# Patient Record
Sex: FEMALE | Race: WHITE | NOT HISPANIC OR LATINO | Employment: FULL TIME | URBAN - METROPOLITAN AREA
[De-identification: names, ages, dates, MRNs, and addresses within clinical notes are randomized per-mention and may not be internally consistent; named-entity substitution may affect disease eponyms.]

---

## 2018-02-09 PROBLEM — H92.01 RIGHT EAR PAIN: Status: ACTIVE | Noted: 2018-02-09

## 2018-05-21 PROBLEM — R73.09 HEMOGLOBIN A1C GREATER THAN 9%, INDICATING POOR DIABETIC CONTROL: Status: ACTIVE | Noted: 2018-05-21

## 2018-05-21 PROBLEM — H92.01 RIGHT EAR PAIN: Status: RESOLVED | Noted: 2018-02-09 | Resolved: 2018-05-21

## 2018-05-21 PROBLEM — E11.69 DYSLIPIDEMIA WITH LOW HIGH DENSITY LIPOPROTEIN (HDL) CHOLESTEROL WITH HYPERTRIGLYCERIDEMIA DUE TO TYPE 2 DIABETES MELLITUS: Status: ACTIVE | Noted: 2018-05-21

## 2018-05-21 PROBLEM — E78.2 DYSLIPIDEMIA WITH LOW HIGH DENSITY LIPOPROTEIN (HDL) CHOLESTEROL WITH HYPERTRIGLYCERIDEMIA DUE TO TYPE 2 DIABETES MELLITUS: Status: ACTIVE | Noted: 2018-05-21

## 2019-05-23 ENCOUNTER — HOSPITAL ENCOUNTER (OUTPATIENT)
Dept: RADIOLOGY | Facility: HOSPITAL | Age: 54
Discharge: HOME | End: 2019-05-23
Attending: NURSE PRACTITIONER
Payer: COMMERCIAL

## 2019-05-23 DIAGNOSIS — R05.9 COUGH: ICD-10-CM

## 2019-05-23 DIAGNOSIS — R09.82 PND (POST-NASAL DRIP): ICD-10-CM

## 2019-08-07 ENCOUNTER — OFFICE VISIT (OUTPATIENT)
Dept: GASTROENTEROLOGY | Facility: CLINIC | Age: 54
End: 2019-08-07
Payer: COMMERCIAL

## 2019-08-07 VITALS
WEIGHT: 236 LBS | DIASTOLIC BLOOD PRESSURE: 87 MMHG | SYSTOLIC BLOOD PRESSURE: 101 MMHG | RESPIRATION RATE: 18 BRPM | HEART RATE: 73 BPM | OXYGEN SATURATION: 95 % | BODY MASS INDEX: 39.32 KG/M2 | HEIGHT: 65 IN

## 2019-08-07 DIAGNOSIS — Z80.0 FAMILY HISTORY OF COLON CANCER: ICD-10-CM

## 2019-08-07 DIAGNOSIS — Z12.11 ENCOUNTER FOR SCREENING COLONOSCOPY: ICD-10-CM

## 2019-08-07 DIAGNOSIS — Z12.11 COLON CANCER SCREENING: ICD-10-CM

## 2019-08-07 DIAGNOSIS — K21.00 GASTROESOPHAGEAL REFLUX DISEASE WITH ESOPHAGITIS: Primary | ICD-10-CM

## 2019-08-07 DIAGNOSIS — E66.9 OBESITY (BMI 35.0-39.9 WITHOUT COMORBIDITY): ICD-10-CM

## 2019-08-07 DIAGNOSIS — K21.9 GERD (GASTROESOPHAGEAL REFLUX DISEASE): ICD-10-CM

## 2019-08-07 DIAGNOSIS — K21.9 GASTROESOPHAGEAL REFLUX DISEASE, ESOPHAGITIS PRESENCE NOT SPECIFIED: Primary | ICD-10-CM

## 2019-08-07 DIAGNOSIS — R10.11 ABDOMINAL DISCOMFORT IN RIGHT UPPER QUADRANT: ICD-10-CM

## 2019-08-07 PROCEDURE — 99203 PR OFFICE/OUTPT VISIT, NEW, LEVL III, 30-44 MIN: ICD-10-PCS | Mod: S$GLB,,, | Performed by: INTERNAL MEDICINE

## 2019-08-07 PROCEDURE — 99203 OFFICE O/P NEW LOW 30 MIN: CPT | Mod: S$GLB,,, | Performed by: INTERNAL MEDICINE

## 2019-08-07 PROCEDURE — 99999 PR PBB SHADOW E&M-EST. PATIENT-LVL III: ICD-10-PCS | Mod: PBBFAC,,, | Performed by: INTERNAL MEDICINE

## 2019-08-07 PROCEDURE — 99999 PR PBB SHADOW E&M-EST. PATIENT-LVL III: CPT | Mod: PBBFAC,,, | Performed by: INTERNAL MEDICINE

## 2019-08-07 RX ORDER — ESCITALOPRAM OXALATE 10 MG/1
20 TABLET ORAL DAILY
Refills: 11 | COMMUNITY
Start: 2019-08-04

## 2019-08-07 RX ORDER — SODIUM, POTASSIUM,MAG SULFATES 17.5-3.13G
1 SOLUTION, RECONSTITUTED, ORAL ORAL DAILY
Qty: 1 KIT | Refills: 0 | Status: SHIPPED | OUTPATIENT
Start: 2019-08-07 | End: 2019-08-09

## 2019-08-07 NOTE — PROGRESS NOTES
"Subjective:       Patient ID: Zunilda Alvarez is a 54 y.o. female.    Chief Complaint: Gas; Diarrhea; and Other ("stinky belches")    Her mother had colon cancer in the 60s.  Her last colonoscopy was 15 years ago.  She has had bowel irregularity.  She has had semi solid bowel movements.  Additionally she has had pyrosis and dyspepsia but she denies dysphagia.  She has eructations.  She denies aspiration or tension.  She thinks the omeprazole has not helped her.  She denies hematemesis hematochezia dysphagia aspiration jaundice melena.      Allergies:  Review of patient's allergies indicates:  No Known Allergies    Medications:    Current Outpatient Medications:     aspirin (ECOTRIN) 81 MG EC tablet, Take 81 mg by mouth once daily., Disp: , Rfl:     blood-glucose meter kit, TID testing of blood sugar Type II DM with long term use of insulin, Disp: 1 each, Rfl: 0    CONTOUR NEXT TEST STRIPS Strp, USE 1 STRIP TO CHECK GLUCOSE THREE TIMES DAILY, Disp: 100 strip, Rfl: 11    empagliflozin (JARDIANCE) 10 mg Tab, Take 10 mg by mouth once daily., Disp: 90 tablet, Rfl: 1    escitalopram oxalate (LEXAPRO) 10 MG tablet, TAKE 1 TABLET BY MOUTH ONCE DAILY FOR 30 DAYS, Disp: , Rfl: 11    fenofibric acid, choline, (TRILIPIX ORAL), Trilipix, Disp: , Rfl:     fluticasone propionate (FLONASE) 50 mcg/actuation nasal spray, 2 sprays (100 mcg total) by Each Nare route once daily., Disp: 1 Bottle, Rfl: 2    LANTUS SOLOSTAR U-100 INSULIN glargine 100 units/mL (3mL) SubQ pen, INJECT 70 UNITS SUBCUTANEOUSLY IN THE EVENING, Disp: 15 Syringe, Rfl: 11    lisinopril-hydrochlorothiazide (PRINZIDE,ZESTORETIC) 10-12.5 mg per tablet, Take 1 tablet by mouth once daily., Disp: , Rfl:     loratadine-pseudoephedrine 5-120 mg (CLARITIN-D 12 HOUR) 5-120 mg per tablet, Claritin-D 12 Hour 5 mg-120 mg tablet,extended release  Take 1 tablet every 12 hours by oral route., Disp: , Rfl:     metFORMIN (GLUCOPHAGE) 1000 MG tablet, TAKE ONE TABLET " BY MOUTH TWICE DAILY WITH MEALS, Disp: 60 tablet, Rfl: 11    metoprolol succinate (TOPROL-XL) 50 MG 24 hr tablet, Take 50 mg by mouth 2 (two) times daily. , Disp: , Rfl:     NOVOLOG FLEXPEN U-100 INSULIN 100 unit/mL InPn pen, INJECT 18 UNITS SUBCUTANEOUSLY THREE TIMES DAILY WITH MEALS, Disp: 15 Syringe, Rfl: 5    omeprazole (PRILOSEC) 40 MG capsule, Take 40 mg by mouth once daily., Disp: , Rfl:     pravastatin (PRAVACHOL) 20 MG tablet, Take 20 mg by mouth once daily., Disp: , Rfl:     semaglutide (OZEMPIC) 0.25 mg or 0.5 mg(2 mg/1.5 mL) PnIj, Inject 1 mg into the skin every 7 days., Disp: 3 mL, Rfl: 5    Past Medical History:   Diagnosis Date    Diabetes mellitus type 2, insulin dependent     Hypertension        Past Surgical History:   Procedure Laterality Date    HYSTERECTOMY  1998         Review of Systems   Constitutional: Negative for appetite change, fever and unexpected weight change.   HENT: Negative for trouble swallowing.         No jaundice.   Respiratory: Negative for cough, shortness of breath and wheezing.         She she is physically active and denies respiratory symptoms.  She has never used tobacco.  She denies alcohol.   Cardiovascular: Negative for chest pain.        She states her hypertension and hyperlipidemia well controlled.  She denies cardiopulmonary symptoms.   Gastrointestinal: Positive for abdominal distention and diarrhea. Negative for anal bleeding, blood in stool, constipation and nausea.        She does have nausea without vomiting.  She does not relate her GI symptoms to a specific food.  She describes semi solid bowel movements with increased gassiness in .   Endocrine:        She states her diabetes is well controlled.   Musculoskeletal: Positive for arthralgias. Negative for back pain and neck pain.   Skin: Negative for pallor and rash.   Neurological: Negative for dizziness, seizures, syncope, speech difficulty, weakness and numbness.   Hematological: Negative for  adenopathy.   Psychiatric/Behavioral: Negative for confusion.       Objective:      Physical Exam   Constitutional: She is oriented to person, place, and time. She appears well-developed and well-nourished.   Well-nourished well-hydrated obese nonicteric white female   HENT:   Head: Normocephalic.   Eyes: Pupils are equal, round, and reactive to light. EOM are normal.   Neck: Normal range of motion. Neck supple. No tracheal deviation present. No thyromegaly present.   Cardiovascular: Normal rate, regular rhythm and normal heart sounds.   Pulmonary/Chest: Effort normal and breath sounds normal.   Abdominal: Soft. Bowel sounds are normal.   Abdomen is soft with mild tenderness in the right upper quadrant epigastrium.  Organomegaly masses are not detected.  Bowel sounds are normal.  Rectal time of colonoscopy   Musculoskeletal: Normal range of motion.   She can ambulate normally.  She can get from the sitting to the standing position without difficulty.  She get on the examination table without assistance.   Lymphadenopathy:     She has no cervical adenopathy.   Neurological: She is alert and oriented to person, place, and time. No cranial nerve deficit.   Skin: Skin is warm and dry.   Psychiatric: She has a normal mood and affect. Her behavior is normal.   Vitals reviewed.        Plan:       Gastroesophageal reflux disease with esophagitis    Abdominal discomfort in right upper quadrant    Encounter for screening colonoscopy    Family history of colon cancer    Obesity (BMI 35.0-39.9 without comorbidity)    Other orders  -     US Abdomen Complete; Future; Expected date: 08/07/2019

## 2019-08-07 NOTE — LETTER
August 7, 2019      Lisa Y. Cooksey, DIONICIO  [address]  [place]  [place] [address]           [facility]  [address], [address]  [place] [address]  Phone: [phone]  Fax: [phone]          Patient: Zunilda Alvarez   MR Number: [id number]   YOB: 1965   Date of Visit: 8/7/2019       Dear Lisa Y. Cooksey:    Thank you for referring Zunilda Alvarez to me for evaluation. Attached you will find relevant portions of my assessment and plan of care.    If you have questions, please do not hesitate to call me. I look forward to following Zunilda Alvarez along with you.    Sincerely,    Carter Rice MD    Enclosure  CC:  No Recipients    If you would like to receive this communication electronically, please contact [email or web address] or [phone] to request more information on [employer] Link access.    For providers and/or their staff who would like to refer a patient to [facility], please contact us through our one-stop-shop provider referral line, [facility], at [phone].    If you feel you have received this communication in error or would no longer like to receive these types of communications, please e-mail [email or web address]

## 2019-08-07 NOTE — PROGRESS NOTES
She will continue her reflux regimen current medications.  She continues her diabetic diet.  She will try to pinpoint any off ending foods.  I have encouraged weight reduction.  She has good health knowledge.  She understands the procedures of upper endoscopy and colonoscopy.  Specifically she realizes extremely small incidence of bleeding perforation or aspiration.

## 2019-08-07 NOTE — PATIENT INSTRUCTIONS
She will be scheduled for an ultrasound upper endoscopy and colonoscopy.  She will continue her current medications and ADA diet.  I have encouraged weight reduction.

## 2019-08-15 ENCOUNTER — HOSPITAL ENCOUNTER (OUTPATIENT)
Dept: RADIOLOGY | Facility: HOSPITAL | Age: 54
Discharge: HOME | End: 2019-08-15
Attending: INTERNAL MEDICINE
Payer: COMMERCIAL

## 2019-08-15 ENCOUNTER — TELEPHONE (OUTPATIENT)
Dept: GASTROENTEROLOGY | Facility: CLINIC | Age: 54
End: 2019-08-15

## 2019-08-15 DIAGNOSIS — N28.89 NODULE OF KIDNEY: Primary | ICD-10-CM

## 2019-08-15 PROCEDURE — 76700 US EXAM ABDOM COMPLETE: CPT | Mod: 26,,, | Performed by: RADIOLOGY

## 2019-08-15 PROCEDURE — 76700 US ABDOMEN COMPLETE: ICD-10-PCS | Mod: 26,,, | Performed by: RADIOLOGY

## 2019-08-15 PROCEDURE — 76700 US EXAM ABDOM COMPLETE: CPT | Mod: TC

## 2019-08-15 NOTE — TELEPHONE ENCOUNTER
----- Message from Carter Rice MD sent at 8/15/2019  9:48 AM CDT -----  Tell her the ultrasound revealed possible gallstones and a nodule in the kidney.  Order CT scan upper abdomen and pelvis with and without contrast as ordered by the radiologist.

## 2019-08-19 ENCOUNTER — TELEPHONE (OUTPATIENT)
Dept: GASTROENTEROLOGY | Facility: CLINIC | Age: 54
End: 2019-08-19

## 2019-08-19 DIAGNOSIS — N28.89 NODULE OF KIDNEY: Primary | ICD-10-CM

## 2019-08-20 ENCOUNTER — HOSPITAL ENCOUNTER (OUTPATIENT)
Dept: RADIOLOGY | Facility: HOSPITAL | Age: 54
Discharge: HOME | End: 2019-08-20
Attending: INTERNAL MEDICINE
Payer: COMMERCIAL

## 2019-08-20 DIAGNOSIS — N28.89 NODULE OF KIDNEY: ICD-10-CM

## 2019-08-20 PROCEDURE — 74178 CT ABDOMEN PELVIS W WO CONTRAST: ICD-10-PCS | Mod: 26,,, | Performed by: RADIOLOGY

## 2019-08-20 PROCEDURE — 74178 CT ABD&PLV WO CNTR FLWD CNTR: CPT | Mod: 26,,, | Performed by: RADIOLOGY

## 2019-08-20 PROCEDURE — 74178 CT ABD&PLV WO CNTR FLWD CNTR: CPT | Mod: TC

## 2019-08-20 PROCEDURE — 25500020 PHARM REV CODE 255: Performed by: INTERNAL MEDICINE

## 2019-08-20 RX ADMIN — IOHEXOL 100 ML: 350 INJECTION, SOLUTION INTRAVENOUS at 11:08

## 2019-08-20 RX ADMIN — IOHEXOL 15 ML: 300 INJECTION, SOLUTION INTRAVENOUS at 10:08

## 2019-08-21 ENCOUNTER — TELEPHONE (OUTPATIENT)
Dept: GASTROENTEROLOGY | Facility: CLINIC | Age: 54
End: 2019-08-21

## 2019-08-21 DIAGNOSIS — N83.209 CYST OF OVARY, UNSPECIFIED LATERALITY: ICD-10-CM

## 2019-08-21 DIAGNOSIS — N28.81 ENLARGED KIDNEY: Primary | ICD-10-CM

## 2019-08-21 NOTE — TELEPHONE ENCOUNTER
----- Message from Carter Rice MD sent at 8/21/2019  7:22 AM CDT -----  Tell her she has an ovarian cyst and refer her to her gynecologist.  Additionally tell her she has an adrenal enlargement and refer her to the urologist

## 2019-08-21 NOTE — TELEPHONE ENCOUNTER
Pt given CT results. Pt verbalized understanding. She stated she has a GYN, but hasn't been in quite some time. Pt stated she was seeing Dr. Bhaskar Guevara. Will send GYN referral to Dr. Guevara's office. Pt scheduled with Berta Carney NP for urology consult.

## 2019-09-03 ENCOUNTER — TELEPHONE (OUTPATIENT)
Dept: GASTROENTEROLOGY | Facility: CLINIC | Age: 54
End: 2019-09-03

## 2019-09-03 DIAGNOSIS — N28.81 ENLARGED KIDNEY: Primary | ICD-10-CM

## 2019-09-03 DIAGNOSIS — N28.89 NODULE OF KIDNEY: ICD-10-CM

## 2019-09-09 ENCOUNTER — TELEPHONE (OUTPATIENT)
Dept: GASTROENTEROLOGY | Facility: CLINIC | Age: 54
End: 2019-09-09

## 2019-09-09 NOTE — TELEPHONE ENCOUNTER
Informed pt that surgery scheduling advised her to show up for procedure tomorrow at noon. Pt verbalized understanding.    Pt also stated she had not been called about her referral placed on 9/3. Appt scheduled at this time.

## 2019-09-09 NOTE — TELEPHONE ENCOUNTER
----- Message from Rashaad Andrea sent at 9/9/2019 12:27 PM CDT -----  Contact: Patient  Type: Needs Medical Advice    Who Called:  Patient  Best Call Back Number: [phone]  Additional Information: Patient is scheduled for EGD on 9/10/19 and has not received call to notify her of arrival time. Please advise patient when she should arrive at facility for procedure

## 2019-09-10 ENCOUNTER — ANESTHESIA (OUTPATIENT)
Dept: SURGERY | Facility: HOSPITAL | Age: 54
End: 2019-09-10
Payer: COMMERCIAL

## 2019-09-10 ENCOUNTER — HOSPITAL ENCOUNTER (OUTPATIENT)
Facility: HOSPITAL | Age: 54
Discharge: HOME | End: 2019-09-10
Attending: INTERNAL MEDICINE | Admitting: INTERNAL MEDICINE
Payer: COMMERCIAL

## 2019-09-10 ENCOUNTER — ANESTHESIA EVENT (OUTPATIENT)
Dept: SURGERY | Facility: HOSPITAL | Age: 54
End: 2019-09-10
Payer: COMMERCIAL

## 2019-09-10 VITALS
WEIGHT: 227 LBS | DIASTOLIC BLOOD PRESSURE: 80 MMHG | OXYGEN SATURATION: 97 % | HEART RATE: 81 BPM | SYSTOLIC BLOOD PRESSURE: 119 MMHG | BODY MASS INDEX: 38.76 KG/M2 | HEIGHT: 64 IN | TEMPERATURE: 98 F | RESPIRATION RATE: 20 BRPM

## 2019-09-10 DIAGNOSIS — K21.9 GERD (GASTROESOPHAGEAL REFLUX DISEASE): Primary | ICD-10-CM

## 2019-09-10 DIAGNOSIS — K21.9 GASTROESOPHAGEAL REFLUX DISEASE, ESOPHAGITIS PRESENCE NOT SPECIFIED: ICD-10-CM

## 2019-09-10 DIAGNOSIS — Z12.11 COLON CANCER SCREENING: ICD-10-CM

## 2019-09-10 LAB — POCT GLUCOSE: 152 MG/DL (ref 70–110)

## 2019-09-10 PROCEDURE — D9220A PRA ANESTHESIA: ICD-10-PCS | Mod: 33,,, | Performed by: ANESTHESIOLOGY

## 2019-09-10 PROCEDURE — 25000003 PHARM REV CODE 250: Performed by: NURSE ANESTHETIST, CERTIFIED REGISTERED

## 2019-09-10 PROCEDURE — G0121 COLON CA SCRN NOT HI RSK IND: HCPCS | Mod: ,,, | Performed by: INTERNAL MEDICINE

## 2019-09-10 PROCEDURE — 45378 DIAGNOSTIC COLONOSCOPY: CPT | Performed by: INTERNAL MEDICINE

## 2019-09-10 PROCEDURE — D9220A PRA ANESTHESIA: Mod: 33,,, | Performed by: ANESTHESIOLOGY

## 2019-09-10 PROCEDURE — 63600175 PHARM REV CODE 636 W HCPCS: Performed by: NURSE ANESTHETIST, CERTIFIED REGISTERED

## 2019-09-10 PROCEDURE — 88305 TISSUE EXAM BY PATHOLOGIST: CPT | Performed by: PATHOLOGY

## 2019-09-10 PROCEDURE — 43239 EGD BIOPSY SINGLE/MULTIPLE: CPT | Performed by: INTERNAL MEDICINE

## 2019-09-10 PROCEDURE — 63600175 PHARM REV CODE 636 W HCPCS: Performed by: ANESTHESIOLOGY

## 2019-09-10 PROCEDURE — 37000008 HC ANESTHESIA 1ST 15 MINUTES: Performed by: INTERNAL MEDICINE

## 2019-09-10 PROCEDURE — 25000003 PHARM REV CODE 250: Performed by: ANESTHESIOLOGY

## 2019-09-10 PROCEDURE — S0028 INJECTION, FAMOTIDINE, 20 MG: HCPCS | Performed by: ANESTHESIOLOGY

## 2019-09-10 PROCEDURE — 43239 PR EGD, FLEX, W/BIOPSY, SGL/MULTI: ICD-10-PCS | Mod: 51,,, | Performed by: INTERNAL MEDICINE

## 2019-09-10 PROCEDURE — G0121 COLON CA SCRN NOT HI RSK IND: ICD-10-PCS | Mod: ,,, | Performed by: INTERNAL MEDICINE

## 2019-09-10 PROCEDURE — 88305 TISSUE EXAM BY PATHOLOGIST: CPT | Mod: 26,,, | Performed by: PATHOLOGY

## 2019-09-10 PROCEDURE — 27201012 HC FORCEPS, HOT/COLD, DISP: Performed by: INTERNAL MEDICINE

## 2019-09-10 PROCEDURE — 43239 EGD BIOPSY SINGLE/MULTIPLE: CPT | Mod: 51,,, | Performed by: INTERNAL MEDICINE

## 2019-09-10 PROCEDURE — 88305 TISSUE SPECIMEN TO PATHOLOGY - SURGERY: ICD-10-PCS | Mod: 26,,, | Performed by: PATHOLOGY

## 2019-09-10 PROCEDURE — 37000009 HC ANESTHESIA EA ADD 15 MINS: Performed by: INTERNAL MEDICINE

## 2019-09-10 RX ORDER — SODIUM CHLORIDE, SODIUM LACTATE, POTASSIUM CHLORIDE, CALCIUM CHLORIDE 600; 310; 30; 20 MG/100ML; MG/100ML; MG/100ML; MG/100ML
INJECTION, SOLUTION INTRAVENOUS CONTINUOUS
Status: DISCONTINUED | OUTPATIENT
Start: 2019-09-10 | End: 2019-09-10 | Stop reason: HOSPADM

## 2019-09-10 RX ORDER — ONDANSETRON 2 MG/ML
4 INJECTION INTRAMUSCULAR; INTRAVENOUS DAILY PRN
Status: CANCELLED | OUTPATIENT
Start: 2019-09-10

## 2019-09-10 RX ORDER — FAMOTIDINE 10 MG/ML
20 INJECTION INTRAVENOUS ONCE
Status: COMPLETED | OUTPATIENT
Start: 2019-09-10 | End: 2019-09-10

## 2019-09-10 RX ORDER — FAMOTIDINE 10 MG/ML
INJECTION INTRAVENOUS
Status: DISCONTINUED
Start: 2019-09-10 | End: 2019-09-10 | Stop reason: HOSPADM

## 2019-09-10 RX ORDER — LIDOCAINE HYDROCHLORIDE 10 MG/ML
1 INJECTION, SOLUTION EPIDURAL; INFILTRATION; INTRACAUDAL; PERINEURAL ONCE
Status: DISCONTINUED | OUTPATIENT
Start: 2019-09-10 | End: 2019-09-10 | Stop reason: HOSPADM

## 2019-09-10 RX ORDER — PROPOFOL 10 MG/ML
VIAL (ML) INTRAVENOUS
Status: DISCONTINUED | OUTPATIENT
Start: 2019-09-10 | End: 2019-09-10

## 2019-09-10 RX ORDER — SODIUM CHLORIDE, SODIUM LACTATE, POTASSIUM CHLORIDE, CALCIUM CHLORIDE 600; 310; 30; 20 MG/100ML; MG/100ML; MG/100ML; MG/100ML
125 INJECTION, SOLUTION INTRAVENOUS CONTINUOUS
Status: CANCELLED | OUTPATIENT
Start: 2019-09-10

## 2019-09-10 RX ORDER — EPHEDRINE SULFATE 50 MG/ML
INJECTION, SOLUTION INTRAVENOUS
Status: DISCONTINUED | OUTPATIENT
Start: 2019-09-10 | End: 2019-09-10

## 2019-09-10 RX ORDER — DIPHENHYDRAMINE HYDROCHLORIDE 50 MG/ML
12.5 INJECTION INTRAMUSCULAR; INTRAVENOUS
Status: CANCELLED | OUTPATIENT
Start: 2019-09-10

## 2019-09-10 RX ADMIN — PROPOFOL 100 MG: 10 INJECTION, EMULSION INTRAVENOUS at 03:09

## 2019-09-10 RX ADMIN — PROPOFOL 100 MG: 10 INJECTION, EMULSION INTRAVENOUS at 02:09

## 2019-09-10 RX ADMIN — EPHEDRINE SULFATE 25 MG: 50 INJECTION INTRAMUSCULAR; INTRAVENOUS; SUBCUTANEOUS at 02:09

## 2019-09-10 RX ADMIN — SODIUM CHLORIDE, POTASSIUM CHLORIDE, SODIUM LACTATE AND CALCIUM CHLORIDE: 600; 310; 30; 20 INJECTION, SOLUTION INTRAVENOUS at 02:09

## 2019-09-10 RX ADMIN — FAMOTIDINE 20 MG: 10 INJECTION INTRAVENOUS at 02:09

## 2019-09-10 NOTE — PROVATION PATIENT INSTRUCTIONS
Discharge Summary/Instructions after an Endoscopic Procedure  Patient Name: Zunilda Alvarez  Patient MRN: [id number]  Patient YOB: 1965  Tuesday, September 10, 2019  Carter Rice MD  RESTRICTIONS:  During your procedure today, you received medications for sedation.  These   medications may affect your judgment, balance and coordination.  Therefore,   for 24 hours, you have the following restrictions:   - DO NOT drive a car, operate machinery, make legal/financial decisions,   sign important papers or drink alcohol.    ACTIVITY:  Today: no heavy lifting, straining or running due to procedural   sedation/anesthesia.  The following day: return to full activity including work.  DIET:  Eat and drink normally unless instructed otherwise.     TREATMENT FOR COMMON SIDE EFFECTS:  - Mild abdominal pain, nausea, belching, bloating or excessive gas:  rest,   eat lightly and use a heating pad.  - Sore Throat: treat with throat lozenges and/or gargle with warm salt   water.  - Because air was used during the procedure, expelling large amounts of air   from your rectum or belching is normal.  - If a bowel prep was taken, you may not have a bowel movement for 1-3 days.    This is normal.  SYMPTOMS TO WATCH FOR AND REPORT TO YOUR PHYSICIAN:  1. Abdominal pain or bloating, other than gas cramps.  2. Chest pain.  3. Back pain.  4. Signs of infection such as: chills or fever occurring within 24 hours   after the procedure.  5. Rectal bleeding, which would show as bright red, maroon, or black stools.   (A tablespoon of blood from the rectum is not serious, especially if   hemorrhoids are present.)  6. Vomiting.  7. Weakness or dizziness.  GO DIRECTLY TO THE NEAREST EMERGENCY ROOM IF YOU HAVE ANY OF THE FOLLOWING:      Difficulty breathing              Chills and/or fever over 101 F   Persistent vomiting and/or vomiting blood   Severe abdominal pain   Severe chest pain   Black, tarry stools   Bleeding- more than one tablespoon   Any  other symptom or condition that you feel may need urgent attention  Your doctor recommends these additional instructions:  If any biopsies were taken, your doctors clinic will contact you in 1 to 2   weeks with any results.  - Discharge patient to home (ambulatory).   - Resume previous diet.  For questions, problems or results please call your physician - Carter Rice MD at Work:  [phone].  [facility] EMERGENCY ROOM PHONE NUMBER: [phone]  IF A COMPLICATION OR EMERGENCY SITUATION ARISES AND YOU ARE UNABLE TO REACH   YOUR PHYSICIAN - GO DIRECTLY TO THE EMERGENCY ROOM.  MD Carter Levi MD  9/10/2019 3:20:46 PM  This report has been verified and signed electronically.  PROVATION

## 2019-09-10 NOTE — TRANSFER OF CARE
"Anesthesia Transfer of Care Note    Patient: Zunilda Alvarez    Procedure(s) Performed: Procedure(s) (LRB):  EGD (ESOPHAGOGASTRODUODENOSCOPY) (N/A)  COLONOSCOPY (N/A)    Patient location: PACU    Anesthesia Type: general    Transport from OR: Transported from OR on room air with adequate spontaneous ventilation    Post pain: adequate analgesia    Post assessment: no apparent anesthetic complications and tolerated procedure well    Post vital signs: stable    Level of consciousness: sedated and responds to stimulation    Nausea/Vomiting: no nausea/vomiting    Complications: none    Transfer of care protocol was followed      Last vitals:   Visit Vitals  /60 (BP Location: Right arm, Patient Position: Lying)   Pulse 73   Temp 36.7 °C (98.1 °F) (Oral)   Resp 20   Ht 5' 4" (1.626 m)   Wt 103 kg (227 lb)   SpO2 95%   Breastfeeding? No   BMI 38.96 kg/m²     "

## 2019-09-10 NOTE — OP NOTE
Procedure. Esophageal gastroduodenoscopy with biopsies.  Indications.  Dyspepsia pyrosis and epigastric pain. She denies hematemesis hematochezia or jaundice.  Additionally she has for screening colonoscopy.  Last colonoscopy was 15 years ago.  She has a history of anemia.  She has good health knowledge and she understands the procedure of upper endoscopy and colonoscopy.  Specifically she realizes there is an extremely small incidence of bleeding perforation or aspiration.  She had some difficulty with the prep kit but she believes that she is prepared.  She use the split dose.  Anesthesia.  Monitored anesthesia coverage procedure:  Chetan with the patient in left lateral supine position in the procedure room.  The Pentax upper endoscope was passed without difficulty.  The hyperemic gastroesophageal junction was at 30-39 cm this could junction 38-39 cm.  Diaphragmatic hiatus at 39 cm.  The cardia body and antrum was diffusely hyperemic.  The pre-pyloric mucosa was friable hyperemia in.  There is minimal deformed the pylorus.  Biopsies were obtained of the gastric mucosa.  First and parts of duodenum were normal.  Impression 1.  Esophagitis LA grade A 2.  Gastritis.  Next procedure. Colonoscopy.  With the patient left lateral supine position the noted to be external hemorrhoids and tags.  The Olympus colonoscope but believe was passed to the cecum.  It was a very tortuous colon in vigorous washing and reposition the patient was required.  Compression was applied in the left lower quadrant but because of her obesity was very difficult.  Definite ulcers or polyps were not seen.  There is very careful circumferential inspection.  The scope retroflexed in the rectum is noted be the hemorrhoids.  Withdrawal times were than 8 min.  Impression 1.  External skin tag with hemorrhoids 2.  Internal hemorrhoids.  Patient tolerated procedure well.

## 2019-09-10 NOTE — H&P
She is here for upper endoscopy and colonoscopy.  She has taken aspirin.  She denies heme hematochezia jaundice or bleeding.  She has a family history of colon cancer.  Last colonoscopy 15 years ago.  She has diabetes.  She has had pyrosis and dyspepsia.  She has good health knowledge he the.  Specifically she realizes there is an extremely small incidence of bleeding perforation or aspiration.  Also she realizes and in the flat or several rated polyps can be very difficult to identify.  She denies cardiopulmonary symptoms jaundice or bleeding.  She denies dysphagia.  She has had pyrosis but she denies aspiration hematemesis him buttock easy of chest Michelle bleeding.  She is.  She denies fever chills.  She has mild urinary frequency.  She denies hematuria. Medicines and allergies reviewed.  A or review of Systems is otherwise normal. This examination.  Well-nourished well female.  She is normocephalic.  Pupil normal. Lungs reveal symmetrical respirations.  Heart reveals regular rhythm.  The abdomen is soft without organomegaly masses felt.  Bowel sounds are normal.  Neurologic review she is oriented x3 impression 1.  Gastroesophageal reflux 2.  Family history of colon cancer.  She will be scheduled for upper endoscopy and colonoscopy.

## 2019-09-10 NOTE — PLAN OF CARE
PACU monitors removed for pt to go to bahroom. Steady gait observed. Instructed to pull call light cord if she needs assistance. V/u

## 2019-09-10 NOTE — PROVATION PATIENT INSTRUCTIONS
Discharge Summary/Instructions after an Endoscopic Procedure  Patient Name: Zunilda Alvarez  Patient MRN: [id number]  Patient YOB: 1965  Tuesday, September 10, 2019  Carter Rice MD  RESTRICTIONS:  During your procedure today, you received medications for sedation.  These   medications may affect your judgment, balance and coordination.  Therefore,   for 24 hours, you have the following restrictions:   - DO NOT drive a car, operate machinery, make legal/financial decisions,   sign important papers or drink alcohol.    ACTIVITY:  Today: no heavy lifting, straining or running due to procedural   sedation/anesthesia.  The following day: return to full activity including work.  DIET:  Eat and drink normally unless instructed otherwise.     TREATMENT FOR COMMON SIDE EFFECTS:  - Mild abdominal pain, nausea, belching, bloating or excessive gas:  rest,   eat lightly and use a heating pad.  - Sore Throat: treat with throat lozenges and/or gargle with warm salt   water.  - Because air was used during the procedure, expelling large amounts of air   from your rectum or belching is normal.  - If a bowel prep was taken, you may not have a bowel movement for 1-3 days.    This is normal.  SYMPTOMS TO WATCH FOR AND REPORT TO YOUR PHYSICIAN:  1. Abdominal pain or bloating, other than gas cramps.  2. Chest pain.  3. Back pain.  4. Signs of infection such as: chills or fever occurring within 24 hours   after the procedure.  5. Rectal bleeding, which would show as bright red, maroon, or black stools.   (A tablespoon of blood from the rectum is not serious, especially if   hemorrhoids are present.)  6. Vomiting.  7. Weakness or dizziness.  GO DIRECTLY TO THE NEAREST EMERGENCY ROOM IF YOU HAVE ANY OF THE FOLLOWING:      Difficulty breathing              Chills and/or fever over 101 F   Persistent vomiting and/or vomiting blood   Severe abdominal pain   Severe chest pain   Black, tarry stools   Bleeding- more than one tablespoon   Any  other symptom or condition that you feel may need urgent attention  Your doctor recommends these additional instructions:  If any biopsies were taken, your doctors clinic will contact you in 1 to 2   weeks with any results.  - Discharge patient to home (ambulatory).   - Resume previous diet.  For questions, problems or results please call your physician - Carter Rice MD at Work:  [phone].  [facility] EMERGENCY ROOM PHONE NUMBER: [phone]  IF A COMPLICATION OR EMERGENCY SITUATION ARISES AND YOU ARE UNABLE TO REACH   YOUR PHYSICIAN - GO DIRECTLY TO THE EMERGENCY ROOM.  MD Carter Levi MD  9/10/2019 3:25:11 PM  This report has been verified and signed electronically.  [employer]ATION

## 2019-09-10 NOTE — PLAN OF CARE
Ambulating back to bedside. Reports voiding without difficulty. Denies need for PO fluids. Changing into personal clothing.

## 2019-09-10 NOTE — DISCHARGE SUMMARY
She will continue her reflux regimen current medications.  I have encouraged weight reduction.  She can consider a gastric sleeve.  She will add more fiber to the diet.  She has a follow-up appointment in the office which time I will discuss further evaluation particularly the colon.

## 2019-09-10 NOTE — ANESTHESIA POSTPROCEDURE EVALUATION
Anesthesia Post Evaluation    Patient: Zunilda Alvarez    Procedure(s) Performed: Procedure(s) (LRB):  EGD (ESOPHAGOGASTRODUODENOSCOPY) (N/A)  COLONOSCOPY (N/A)    Final Anesthesia Type: general  Patient location during evaluation: PACU  Patient participation: Yes- Able to Participate  Level of consciousness: awake and alert  Post-procedure vital signs: reviewed and stable  Pain management: adequate  Airway patency: patent  PONV status at discharge: No PONV  Anesthetic complications: no      Cardiovascular status: blood pressure returned to baseline  Respiratory status: unassisted  Hydration status: euvolemic  Follow-up not needed.          Vitals Value Taken Time   /80 9/10/2019  3:30 PM   Temp 36.7 °C (98 °F) 9/10/2019  3:20 PM   Pulse 81 9/10/2019  3:30 PM   Resp 20 9/10/2019  3:30 PM   SpO2 97 % 9/10/2019  3:30 PM         Event Time     Out of Recovery 15:35:00          Pain/Jazlyn Score: Modified Jazlyn Score: 18 (9/10/2019  3:20 PM)

## 2019-09-18 ENCOUNTER — OFFICE VISIT (OUTPATIENT)
Dept: GASTROENTEROLOGY | Facility: CLINIC | Age: 54
End: 2019-09-18
Payer: COMMERCIAL

## 2019-09-18 VITALS
RESPIRATION RATE: 18 BRPM | DIASTOLIC BLOOD PRESSURE: 69 MMHG | WEIGHT: 234 LBS | HEART RATE: 72 BPM | HEIGHT: 64 IN | OXYGEN SATURATION: 97 % | SYSTOLIC BLOOD PRESSURE: 116 MMHG | BODY MASS INDEX: 39.95 KG/M2

## 2019-09-18 DIAGNOSIS — K21.9 GASTROESOPHAGEAL REFLUX DISEASE, ESOPHAGITIS PRESENCE NOT SPECIFIED: Primary | ICD-10-CM

## 2019-09-18 DIAGNOSIS — R19.7 DIARRHEA, UNSPECIFIED TYPE: ICD-10-CM

## 2019-09-18 PROCEDURE — 99213 OFFICE O/P EST LOW 20 MIN: CPT | Mod: S$GLB,,, | Performed by: INTERNAL MEDICINE

## 2019-09-18 PROCEDURE — 99999 PR PBB SHADOW E&M-EST. PATIENT-LVL III: CPT | Mod: PBBFAC,,, | Performed by: INTERNAL MEDICINE

## 2019-09-18 PROCEDURE — 99999 PR PBB SHADOW E&M-EST. PATIENT-LVL III: ICD-10-PCS | Mod: PBBFAC,,, | Performed by: INTERNAL MEDICINE

## 2019-09-18 PROCEDURE — 99213 PR OFFICE/OUTPT VISIT, EST, LEVL III, 20-29 MIN: ICD-10-PCS | Mod: S$GLB,,, | Performed by: INTERNAL MEDICINE

## 2019-09-18 NOTE — LETTER
September 20, 2019      Lisa Y. Cooksey, DIONICIO  [address]  [address]  [place] [address]           [facility]  [address], [address]  [place] [address]  Phone: [phone]  Fax: [phone]          Patient: Zunilda Alvarez   MR Number: [id number]   YOB: 1965   Date of Visit: 9/18/2019       Dear Lisa Y. Cooksey:    Thank you for referring Zunilda Alvarez to me for evaluation. Attached you will find relevant portions of my assessment and plan of care.    If you have questions, please do not hesitate to call me. I look forward to following Zunilda Alvarez along with you.    Sincerely,    Carter Rice MD    Enclosure  CC:  No Recipients    If you would like to receive this communication electronically, please contact [email or web address] or [phone] to request more information on [employer] access.    For providers and/or their staff who would like to refer a patient to [facility], please contact us through our one-stop-shop provider referral line, [facility], at [phone].    If you feel you have received this communication in error or would no longer like to receive these types of communications, please e-mail [email or web address]

## 2019-09-18 NOTE — PATIENT INSTRUCTIONS
She is found have gastroesophageal reflux.  The omeprazole has not worked as well. She still is having pyrosis and dyspepsia and she was be started on the ranitidine 150 mg HS.  She does have of obesity.  Weight reduction would be ideal.  She was found to have hemorrhoids and generally has 2-3 soft bowel movements per day without straining.  She denies anal symptoms.  She believes she is getting enough fiber.  She started on the Colestid.

## 2019-09-20 PROBLEM — R19.7 DIARRHEA: Status: ACTIVE | Noted: 2019-09-20

## 2019-09-20 RX ORDER — MONTELUKAST SODIUM 4 MG/1
1 TABLET, CHEWABLE ORAL DAILY
Qty: 90 TABLET | Refills: 3 | Status: SHIPPED | OUTPATIENT
Start: 2019-09-20 | End: 2020-09-29

## 2019-09-20 NOTE — PROGRESS NOTES
She will continue her reflux regimen.  A bed of the ranitidine.  She continues the omeprazole.  Weight loss would be ideal.  She may need consideration for bariatric surgery.  She will make a food diary and avoid the offending foods.  She will add more fiber to the diet.  She was started on the Colestid.  She she continues her vitamins and minerals.

## 2019-09-20 NOTE — PROGRESS NOTES
Subjective:       Patient ID: Zunilda Alvarez is a 54 y.o. female.    Chief Complaint: Follow-up    She was found to have gastroesophageal reflux.  She states the PPI has not resolved her symptoms and.  She was started on the ranitidine at night.  She will make a food diary and avoid the offending foods.  She is trying to reduce her weight.  Colonoscopy revealed hemorrhoids.  She is having 2-3 bowel movements per day.  She does not pinpoint a precipitating factor.  She has occasional cramping but she denies hematemesis hematochezia jaundice or bleeding.  She denies aspiration or cardiopulmonary symptoms.      Allergies:  Review of patient's allergies indicates:  No Known Allergies    Medications:    Current Outpatient Medications:     aspirin (ECOTRIN) 81 MG EC tablet, Take 81 mg by mouth once daily., Disp: , Rfl:     blood-glucose meter kit, TID testing of blood sugar Type II DM with long term use of insulin, Disp: 1 each, Rfl: 0    CONTOUR NEXT TEST STRIPS Strp, USE 1 STRIP TO CHECK GLUCOSE THREE TIMES DAILY, Disp: 100 strip, Rfl: 11    empagliflozin (JARDIANCE) 10 mg Tab, Take 10 mg by mouth once daily., Disp: 90 tablet, Rfl: 1    escitalopram oxalate (LEXAPRO) 10 MG tablet, TAKE 1 TABLET BY MOUTH ONCE DAILY FOR 30 DAYS, Disp: , Rfl: 11    fluticasone propionate (FLONASE) 50 mcg/actuation nasal spray, 2 sprays (100 mcg total) by Each Nare route once daily., Disp: 1 Bottle, Rfl: 2    LANTUS SOLOSTAR U-100 INSULIN glargine 100 units/mL (3mL) SubQ pen, INJECT 70 UNITS SUBCUTANEOUSLY IN THE EVENING, Disp: 15 Syringe, Rfl: 11    lisinopril-hydrochlorothiazide (PRINZIDE,ZESTORETIC) 10-12.5 mg per tablet, Take 1 tablet by mouth once daily., Disp: , Rfl:     loratadine-pseudoephedrine 5-120 mg (CLARITIN-D 12 HOUR) 5-120 mg per tablet, Claritin-D 12 Hour 5 mg-120 mg tablet,extended release  Take 1 tablet every 12 hours by oral route., Disp: , Rfl:     metFORMIN (GLUCOPHAGE) 1000 MG tablet, TAKE ONE TABLET  BY MOUTH TWICE DAILY WITH MEALS, Disp: 60 tablet, Rfl: 11    metoprolol succinate (TOPROL-XL) 50 MG 24 hr tablet, Take 50 mg by mouth 2 (two) times daily. , Disp: , Rfl:     NOVOLOG FLEXPEN U-100 INSULIN 100 unit/mL InPn pen, INJECT 18 UNITS SUBCUTANEOUSLY THREE TIMES DAILY WITH MEALS, Disp: 15 Syringe, Rfl: 5    omeprazole (PRILOSEC) 40 MG capsule, Take 40 mg by mouth once daily., Disp: , Rfl:     pravastatin (PRAVACHOL) 20 MG tablet, Take 20 mg by mouth once daily., Disp: , Rfl:     semaglutide (OZEMPIC) 0.25 mg or 0.5 mg(2 mg/1.5 mL) PnIj, Inject 1 mg into the skin every 7 days., Disp: 3 mL, Rfl: 5    colestipol (COLESTID) 1 gram Tab, Take 1 tablet (1 g total) by mouth once daily., Disp: 90 tablet, Rfl: 3    ranitidine (ZANTAC) 150 MG tablet, Take 1 tablet (150 mg total) by mouth 2 (two) times daily., Disp: 180 tablet, Rfl: 3    Past Medical History:   Diagnosis Date    Diabetes mellitus type 2, insulin dependent     High cholesterol     Hypertension     Sleep apnea        Past Surgical History:   Procedure Laterality Date    COLONOSCOPY N/A 9/10/2019    [facility]    EGD (ESOPHAGOGASTRODUODENOSCOPY) N/A 9/10/2019    Performed by [facility] at [facility] ENDO    HERNIA REPAIR      HYSTERECTOMY  1998         Review of Systems   Constitutional: Negative for appetite change, fever and unexpected weight change.   HENT: Negative for trouble swallowing.         No jaundice.   Respiratory: Negative for cough, shortness of breath and wheezing.         She denies cardiopulmonary symptoms such as dyspnea on exertion.  She denies chronic cough.  She has never used tobacco and she does not use alcohol.   Cardiovascular: Negative for chest pain.        She denies cardiopulmonary symptoms such as exertional chest pain or rhythm disturbances.   Gastrointestinal: Positive for diarrhea and nausea. Negative for abdominal distention, abdominal pain, anal bleeding, blood in stool,  constipation, rectal pain and vomiting.   Endocrine:        She states her diabetes is well controlled with the current medications.   Genitourinary:        The ultrasound revealed a nodule in the kidney.  The CT scan did not confirm this but revealed a a adrenal mass.  She was seen by the Urology service and was suggestive cheese the endocrinologist.   Musculoskeletal: Negative for back pain and neck pain.   Skin: Negative for pallor and rash.   Neurological: Negative for dizziness, seizures, syncope, speech difficulty, weakness and numbness.   Hematological: Negative for adenopathy.   Psychiatric/Behavioral: Negative for confusion.       Objective:      Physical Exam   Constitutional: She is oriented to person, place, and time. She appears well-developed and well-nourished.   Well-nourished well-hydrated obese nonicteric white female.   HENT:   Head: Normocephalic.   Eyes: Pupils are equal, round, and reactive to light. EOM are normal.   Neck: Normal range of motion. Neck supple. No tracheal deviation present. No thyromegaly present.   Cardiovascular: Normal rate, regular rhythm and normal heart sounds.   Pulmonary/Chest: Effort normal and breath sounds normal.   Abdominal: Soft. Bowel sounds are normal.   Abdomen is soft it is obese without tenderness masses organomegaly bowel sounds are normal.   Musculoskeletal: Normal range of motion.   She can ambulate normally.  She can go from the sitting to the standing position without difficulty.   Lymphadenopathy:     She has no cervical adenopathy.   Neurological: She is alert and oriented to person, place, and time. No cranial nerve deficit.   Skin: Skin is warm and dry.   Psychiatric: She has a normal mood and affect. Her behavior is normal.   Vitals reviewed.        Plan:       Gastroesophageal reflux disease, esophagitis presence not specified  -     ranitidine (ZANTAC) 150 MG tablet; Take 1 tablet (150 mg total) by mouth 2 (two) times daily.  Dispense: 180 tablet;  Refill: 3    Diarrhea, unspecified type  -     colestipol (COLESTID) 1 gram Tab; Take 1 tablet (1 g total) by mouth once daily.  Dispense: 90 tablet; Refill: 3

## 2019-09-30 PROBLEM — E27.8 ADRENAL NODULE: Status: ACTIVE | Noted: 2019-09-30

## 2019-09-30 PROBLEM — E27.9 ADRENAL NODULE: Status: ACTIVE | Noted: 2019-09-30

## 2019-09-30 NOTE — ASSESSMENT & PLAN NOTE
Left adrenal nodule seen on CT   - clinically, obese and uncontrolled diabetes. Well controlled HTN. No other symptoms   - will attempt to f/u with radiology about HU and washout to further help categorize it   - will obtain biochemical evaluation. Discussed with patient need for blood test today and separate blood test for dex suppression testing. Also discussed potential need for 24 hr urine sample for f/u testing.   - plan to repeat CT after 1 year

## 2019-10-01 ENCOUNTER — OFFICE VISIT (OUTPATIENT)
Dept: ENDOCRINOLOGY | Facility: CLINIC | Age: 54
End: 2019-10-01
Payer: COMMERCIAL

## 2019-10-01 ENCOUNTER — LAB VISIT (OUTPATIENT)
Dept: LAB | Facility: HOSPITAL | Age: 54
End: 2019-10-01
Attending: INTERNAL MEDICINE
Payer: COMMERCIAL

## 2019-10-01 VITALS
DIASTOLIC BLOOD PRESSURE: 70 MMHG | TEMPERATURE: 98 F | BODY MASS INDEX: 39.14 KG/M2 | WEIGHT: 229.25 LBS | HEIGHT: 64 IN | HEART RATE: 73 BPM | SYSTOLIC BLOOD PRESSURE: 122 MMHG

## 2019-10-01 DIAGNOSIS — E66.01 CLASS 2 SEVERE OBESITY DUE TO EXCESS CALORIES WITH SERIOUS COMORBIDITY AND BODY MASS INDEX (BMI) OF 39.0 TO 39.9 IN ADULT: ICD-10-CM

## 2019-10-01 DIAGNOSIS — I10 ESSENTIAL HYPERTENSION: ICD-10-CM

## 2019-10-01 DIAGNOSIS — E27.8 ADRENAL NODULE: ICD-10-CM

## 2019-10-01 PROBLEM — E66.812 CLASS 2 SEVERE OBESITY DUE TO EXCESS CALORIES WITH SERIOUS COMORBIDITY AND BODY MASS INDEX (BMI) OF 39.0 TO 39.9 IN ADULT: Status: ACTIVE | Noted: 2019-08-07

## 2019-10-01 LAB
ANION GAP SERPL CALC-SCNC: 15 MMOL/L (ref 8–16)
BUN SERPL-MCNC: 16 MG/DL (ref 6–20)
CALCIUM SERPL-MCNC: 9.9 MG/DL (ref 8.7–10.5)
CHLORIDE SERPL-SCNC: 103 MMOL/L (ref 95–110)
CO2 SERPL-SCNC: 22 MMOL/L (ref 23–29)
CREAT SERPL-MCNC: 0.7 MG/DL (ref 0.5–1.4)
EST. GFR  (AFRICAN AMERICAN): >60 ML/MIN/1.73 M^2
EST. GFR  (NON AFRICAN AMERICAN): >60 ML/MIN/1.73 M^2
GLUCOSE SERPL-MCNC: 155 MG/DL (ref 70–110)
POTASSIUM SERPL-SCNC: 4.2 MMOL/L (ref 3.5–5.1)
SODIUM SERPL-SCNC: 140 MMOL/L (ref 136–145)

## 2019-10-01 PROCEDURE — 82384 ASSAY THREE CATECHOLAMINES: CPT

## 2019-10-01 PROCEDURE — 36415 COLL VENOUS BLD VENIPUNCTURE: CPT | Mod: PO

## 2019-10-01 PROCEDURE — 84244 ASSAY OF RENIN: CPT

## 2019-10-01 PROCEDURE — 99204 OFFICE O/P NEW MOD 45 MIN: CPT | Mod: S$GLB,,, | Performed by: INTERNAL MEDICINE

## 2019-10-01 PROCEDURE — 99999 PR PBB SHADOW E&M-EST. PATIENT-LVL IV: ICD-10-PCS | Mod: PBBFAC,,, | Performed by: INTERNAL MEDICINE

## 2019-10-01 PROCEDURE — 83835 ASSAY OF METANEPHRINES: CPT

## 2019-10-01 PROCEDURE — 80048 BASIC METABOLIC PNL TOTAL CA: CPT

## 2019-10-01 PROCEDURE — 82088 ASSAY OF ALDOSTERONE: CPT

## 2019-10-01 PROCEDURE — 99999 PR PBB SHADOW E&M-EST. PATIENT-LVL IV: CPT | Mod: PBBFAC,,, | Performed by: INTERNAL MEDICINE

## 2019-10-01 PROCEDURE — 99204 PR OFFICE/OUTPT VISIT, NEW, LEVL IV, 45-59 MIN: ICD-10-PCS | Mod: S$GLB,,, | Performed by: INTERNAL MEDICINE

## 2019-10-01 RX ORDER — DEXAMETHASONE 1 MG/1
1 TABLET ORAL ONCE
Qty: 1 TABLET | Refills: 0 | Status: SHIPPED | OUTPATIENT
Start: 2019-10-01 | End: 2019-10-01

## 2019-10-01 NOTE — PATIENT INSTRUCTIONS
For the adrenal nodule, lab test today.    Another day, need to take dexamethasone 1 mg (1 tablet) at 11 pm at night on Tuesday, then get a blood test around 8am on Wednesday morning for cortisol.    Repeat CT in about 1 year.

## 2019-10-01 NOTE — Clinical Note
October 1, 2019      Ivory Pacheco III, MD  [address]  [place] [address]           [facility]Di[facility]  [address]  [place] [address]  Phone: [phone]          Patient: Zunilda Alvarez   MR Number: [id number]   YOB: 1965   Date of Visit: 10/1/2019       Dear Dr. Ivory Pacheco III:    Thank you for referring Zunilda Alvarez to me for evaluation. Attached you will find relevant portions of my assessment and plan of care.    If you have questions, please do not hesitate to call me. I look forward to following Zunilda Alvarez along with you.    Sincerely,    Curt Stone MD    Enclosure  CC:  No Recipients    If you would like to receive this communication electronically, please contact [email or web address] or [phone] to request more information on [employer] Link access.    For providers and/or their staff who would like to refer a patient to [facility], please contact us through our one-stop-shop provider referral line, [facility], at [phone].    If you feel you have received this communication in error or would no longer like to receive these types of communications, please e-mail [email or web address]

## 2019-10-01 NOTE — ASSESSMENT & PLAN NOTE
BMI elevated, 39   - complicated by HTN, uncontrolled DM2   - pt has been working on weight loss   - on GLP-1   - will screen for cushings as above   - if negative, pt could consider weight loss surgery

## 2019-10-01 NOTE — ASSESSMENT & PLAN NOTE
BP at goal today   - on 3 agents but not maximum doses of any of them   - continue same meds for now

## 2019-10-01 NOTE — PROGRESS NOTES
Subjective:      Chief Complaint: Adrenal Adenoma    HPI: Zunilda Alvarez is a 54 y.o. female who is here for an initial evaluation for adrenal nodule.    With regards to the adrenal nodule:    Patient was found to have an incidental adrenal nodule on imaging (CT abd/pelvis w/ and w/o contrast 8/20/2019 performed for concern about gallstones and kidney nodules).    Adrenal lesion imaging characteristics: small enhancing soft tissue nodule, 1.6 cm in diameter. HU, washout not reported  Dedicated adrenal imaging was not done.    Functional evaluation not done yet.    She does have hx HTN:   Lisinopril-HCTZ (10-12.5), metoprolol 50   No hx hypertensive emergency.    Also has diabetes. Last A1C 9.9, on basal/bolus insulin, empagliflozin, metformin, ozempic (last couple of months). Follows with PCP.   Does have polyuria, polydipsia. Some vision changes.    Today, pt reports feeling okay overall. Still dealing with some reflux but improving with zantac. No abd pain since medication change.    Pt denies hx paroxysmal symptoms such as syncope, pallor, and dizziness. Rare palpitations, mostly with coffee.    No easy bruisability or abnormal stretch marks. No muscle weakness. No other acute complaints today.    Reviewed past medical, family, social history and updated as appropriate.    Review of Systems   Constitutional: Positive for unexpected weight change (weight gain).   HENT: Negative for trouble swallowing.    Eyes: Positive for visual disturbance.   Respiratory: Negative for shortness of breath.    Cardiovascular: Positive for palpitations (some with coffee).   Gastrointestinal: Positive for diarrhea (some). Negative for abdominal pain.   Endocrine: Positive for polydipsia and polyuria.   Genitourinary: Positive for frequency. Negative for dysuria.   Musculoskeletal: Positive for back pain.   Neurological: Negative for light-headedness and headaches.     Objective:     Vitals:    10/01/19 0837   BP: 122/70    Pulse: 73   Temp: 97.8 °F (36.6 °C)     BP Readings from Last 5 Encounters:   10/01/19 122/70   09/18/19 116/69   09/10/19 119/80   09/03/19 112/72   08/07/19 101/87     Physical Exam   Constitutional: She is oriented to person, place, and time. She appears well-developed and well-nourished.   obese   HENT:   Head: Normocephalic and atraumatic.   Right Ear: External ear normal.   Left Ear: External ear normal.   Nose: Nose normal.   Eyes: Pupils are equal, round, and reactive to light. EOM are normal.   Neck: Normal range of motion. Neck supple. No thyromegaly present.   Cardiovascular: Normal rate, regular rhythm and intact distal pulses.   No murmur heard.  Pulmonary/Chest: Effort normal and breath sounds normal.   Abdominal: Soft. She exhibits no distension and no mass. There is no tenderness.   No wide/dark striae   Musculoskeletal: Normal range of motion. She exhibits no edema or tenderness.   Neurological: She is alert and oriented to person, place, and time.   Psychiatric: She has a normal mood and affect. Judgment normal.   Vitals reviewed.      Wt Readings from Last 5 Encounters:   10/01/19 0837 104 kg (229 lb 4.5 oz)   09/18/19 1329 106.1 kg (234 lb)   09/10/19 1407 103 kg (227 lb)   09/03/19 0841 103 kg (227 lb)   08/07/19 1302 107 kg (236 lb)       Lab Results   Component Value Date    HGBA1C 9.9 (H) 06/05/2019    HGBA1C 10.6 (H) 12/10/2018     Lab Results   Component Value Date    CHOL 116 06/05/2019    CHOL 136 12/10/2018    HDL 39 (L) 06/05/2019    HDL 32 (L) 12/10/2018    LDLCALC 54 06/05/2019    LDLCALC 69 12/10/2018    TRIG 147 06/05/2019    TRIG 263 (H) 12/10/2018    CHOLHDL 3.0 06/05/2019    CHOLHDL 4.3 12/10/2018     Lab Results   Component Value Date     06/05/2019    K 4.4 06/05/2019    CL 99 06/05/2019    CO2 29 06/05/2019     (H) 06/05/2019    BUN 13 08/20/2019    CREATININE 0.6 08/20/2019    CALCIUM 9.4 06/05/2019    PROT 6.4 06/05/2019    ALBUMIN 4.1 06/05/2019    BILITOT  0.5 06/05/2019    ALKPHOS 63 06/05/2019    AST 13 06/05/2019    ALT 24 06/05/2019    ESTGFRAFRICA >60.0 08/20/2019    EGFRNONAA >60.0 08/20/2019    TSH 1.60 06/05/2019      Lab Results   Component Value Date    MICALBCREAT 14 06/05/2019     Assessment/Plan:     Adrenal nodule  Left adrenal nodule seen on CT   - clinically, obese and uncontrolled diabetes. Well controlled HTN. No other symptoms   - will attempt to f/u with radiology about HU and washout to further help categorize it   - will obtain biochemical evaluation. Discussed with patient need for blood test today and separate blood test for dex suppression testing. Also discussed potential need for 24 hr urine sample for f/u testing.   - plan to repeat CT after 1 year        Class 2 severe obesity due to excess calories with serious comorbidity and body mass index (BMI) of 39.0 to 39.9 in adult  BMI elevated, 39   - complicated by HTN, uncontrolled DM2   - pt has been working on weight loss   - on GLP-1   - will screen for cushings as above   - if negative, pt could consider weight loss surgery    Hypertension  BP at goal today   - on 3 agents but not maximum doses of any of them   - continue same meds for now        Follow up in about 6 months (around 4/1/2020).

## 2019-10-01 NOTE — LETTER
October 1, 2019        Ivory Pacheco III, MD  [address]  [place] [address]             [facility] - Endo/Diabetes  [address]  [place] [address]  Phone: [phone]   Patient: Zunilda Alvarez   MR Number: [id number]   YOB: 1965   Date of Visit: 10/1/2019       Dear Dr. aPcheco:    Thank you for referring Zunilda Alvarez to me for evaluation. Below are the relevant portions of my assessment and plan of care.    Adrenal nodule, checking labs to see if excess cortisol, aldosterone, or catecholamines. May need 24 hr urine sample if abnormalities are found there. Will try and contact radiology to see if they can report HU to make sure it's benign. If non-functioning and looks okay on HU will just plan for a repeat CT with adrenal protocol in about a year.     If you have questions, please do not hesitate to call me. I look forward to following Zunilda along with you.    Sincerely,      Curt Stone MD           CC  No Recipients

## 2019-10-03 LAB — ALDOST SERPL-MCNC: 7.3 NG/DL

## 2019-10-04 LAB — RENIN PLAS-CCNC: 6.8 NG/ML/H

## 2019-10-07 LAB
METANEPH FREE SERPL-MCNC: <25 PG/ML
METANEPHS SERPL-MCNC: 38 PG/ML
NORMETANEPH FREE SERPL-MCNC: 38 PG/ML

## 2019-10-08 PROBLEM — Z12.11 ENCOUNTER FOR SCREENING COLONOSCOPY: Status: RESOLVED | Noted: 2019-08-07 | Resolved: 2019-10-08

## 2019-10-08 PROBLEM — R19.7 DIARRHEA: Status: RESOLVED | Noted: 2019-09-20 | Resolved: 2019-10-08

## 2019-10-08 PROBLEM — R10.11 ABDOMINAL DISCOMFORT IN RIGHT UPPER QUADRANT: Status: RESOLVED | Noted: 2019-08-07 | Resolved: 2019-10-08

## 2019-10-08 PROBLEM — K21.9 GERD (GASTROESOPHAGEAL REFLUX DISEASE): Status: RESOLVED | Noted: 2019-09-10 | Resolved: 2019-10-08

## 2019-10-09 ENCOUNTER — LAB VISIT (OUTPATIENT)
Dept: LAB | Facility: HOSPITAL | Age: 54
End: 2019-10-09
Attending: NURSE PRACTITIONER
Payer: COMMERCIAL

## 2019-10-09 DIAGNOSIS — E27.8 ADRENAL NODULE: ICD-10-CM

## 2019-10-09 LAB — CORTIS SERPL-MCNC: 10.2 UG/DL (ref 4.3–22.4)

## 2019-10-09 PROCEDURE — 82533 TOTAL CORTISOL: CPT

## 2019-10-09 PROCEDURE — 36415 COLL VENOUS BLD VENIPUNCTURE: CPT

## 2019-10-10 LAB
CATECHOLS PLAS-MCNC: 207 PG/ML
DOPAMINE SERPL-MCNC: <10 PG/ML
EPINEPH PLAS-MCNC: <20 PG/ML
NOREPINEPH PLAS-MCNC: 207 PG/ML

## 2019-10-11 ENCOUNTER — TELEPHONE (OUTPATIENT)
Dept: ENDOCRINOLOGY | Facility: CLINIC | Age: 54
End: 2019-10-11

## 2019-10-11 DIAGNOSIS — E27.8 ADRENAL NODULE: Primary | ICD-10-CM

## 2019-10-11 NOTE — TELEPHONE ENCOUNTER
Lab reviewed. Cortisol 10.  Pt did take dexamethasone at the right time, lab done at 8am.   - Will obtain 24 hr urine sample for cortisol as confirmatory test. Other potential option includes midnight salivary cortisol.    Lab orders in place. Recommend pt  container when convenient for her and did discuss the proper way to collect the 24 hr urine sample. Pt denied having other questions or concerns at this time.

## 2019-10-16 ENCOUNTER — APPOINTMENT (OUTPATIENT)
Dept: LAB | Facility: HOSPITAL | Age: 54
End: 2019-10-16
Attending: NURSE PRACTITIONER
Payer: COMMERCIAL

## 2019-12-18 DIAGNOSIS — K21.9 GASTROESOPHAGEAL REFLUX DISEASE, ESOPHAGITIS PRESENCE NOT SPECIFIED: Primary | ICD-10-CM

## 2019-12-18 RX ORDER — FAMOTIDINE 40 MG/1
40 TABLET, FILM COATED ORAL DAILY
Qty: 30 TABLET | Refills: 11 | Status: SHIPPED | OUTPATIENT
Start: 2019-12-18 | End: 2021-06-04

## 2019-12-18 NOTE — TELEPHONE ENCOUNTER
----- Message from Lynne Marte sent at 12/18/2019 10:25 AM CST -----  Contact: self  Type:  RX Refill Request    Who Called:  patient  Refill or New Rx:  New RX  RX Name and Strength:  Need a replacement for her ranitidine (ZANTAC) 150 MG tablet which is recalled  How is the patient currently taking it? (ex. 1XDay):  1XDay  Is this a 30 day or 90 day RX:  90  Preferred Pharmacy with phone number:    [facility] [place] [place] - [address]  [address]  [facility] [address]  Phone: [phone] Fax: [phone]  Local or Mail Order:  local  Ordering Provider:  Dr Dwayne Rodriguez Call Back Number:  [phone] (home)   Additional Information:  nerissa

## 2020-02-13 ENCOUNTER — TELEPHONE (OUTPATIENT)
Dept: GASTROENTEROLOGY | Facility: CLINIC | Age: 55
End: 2020-02-13

## 2020-02-13 NOTE — TELEPHONE ENCOUNTER
----- Message from Chetna Wtason sent at 2/13/2020 12:58 PM CST -----  Type:  Pharmacy Calling to Clarify an RX    Name of Caller:  Sara  Pharmacy Name:    [id number]  [place], [place] - [address]  [address]  [place] [address]  Phone: [phone] Fax: [phone]  Prescription Name: famotidine (PEPCID) 40 MG tablet  What do they need to clarify?:  Does not carry 40mg dosage. Only have 20mg.

## 2020-03-09 RX ORDER — FAMOTIDINE 20 MG/1
TABLET, FILM COATED ORAL
Qty: 90 TABLET | Refills: 1 | Status: SHIPPED | OUTPATIENT
Start: 2020-03-09 | End: 2020-05-14 | Stop reason: DRUGHIGH

## 2020-03-30 ENCOUNTER — TELEPHONE (OUTPATIENT)
Dept: ENDOCRINOLOGY | Facility: CLINIC | Age: 55
End: 2020-03-30

## 2020-07-10 ENCOUNTER — OFFICE VISIT (OUTPATIENT)
Dept: GASTROENTEROLOGY | Facility: CLINIC | Age: 55
End: 2020-07-10
Payer: COMMERCIAL

## 2020-07-10 VITALS
RESPIRATION RATE: 18 BRPM | WEIGHT: 229 LBS | BODY MASS INDEX: 39.09 KG/M2 | SYSTOLIC BLOOD PRESSURE: 111 MMHG | TEMPERATURE: 98 F | HEART RATE: 81 BPM | DIASTOLIC BLOOD PRESSURE: 72 MMHG | HEIGHT: 64 IN | OXYGEN SATURATION: 97 %

## 2020-07-10 DIAGNOSIS — K21.9 GASTROESOPHAGEAL REFLUX DISEASE, ESOPHAGITIS PRESENCE NOT SPECIFIED: Primary | ICD-10-CM

## 2020-07-10 DIAGNOSIS — K21.00 GASTROESOPHAGEAL REFLUX DISEASE WITH ESOPHAGITIS: ICD-10-CM

## 2020-07-10 DIAGNOSIS — E66.9 OBESITY (BMI 35.0-39.9 WITHOUT COMORBIDITY): ICD-10-CM

## 2020-07-10 PROCEDURE — 3008F BODY MASS INDEX DOCD: CPT | Mod: S$GLB,,, | Performed by: INTERNAL MEDICINE

## 2020-07-10 PROCEDURE — 99213 PR OFFICE/OUTPT VISIT, EST, LEVL III, 20-29 MIN: ICD-10-PCS | Mod: S$GLB,,, | Performed by: INTERNAL MEDICINE

## 2020-07-10 PROCEDURE — 99999 PR PBB SHADOW E&M-EST. PATIENT-LVL V: CPT | Mod: PBBFAC,,, | Performed by: INTERNAL MEDICINE

## 2020-07-10 PROCEDURE — 99999 PR PBB SHADOW E&M-EST. PATIENT-LVL V: ICD-10-PCS | Mod: PBBFAC,,, | Performed by: INTERNAL MEDICINE

## 2020-07-10 PROCEDURE — 99213 OFFICE O/P EST LOW 20 MIN: CPT | Mod: S$GLB,,, | Performed by: INTERNAL MEDICINE

## 2020-07-10 PROCEDURE — 3008F PR BODY MASS INDEX (BMI) DOCUMENTED: ICD-10-PCS | Mod: S$GLB,,, | Performed by: INTERNAL MEDICINE

## 2020-07-10 RX ORDER — CIMETIDINE 400 MG/1
400 TABLET, FILM COATED ORAL NIGHTLY
Qty: 90 TABLET | Refills: 3 | Status: SHIPPED | OUTPATIENT
Start: 2020-07-10 | End: 2021-07-06

## 2020-07-10 NOTE — LETTER
July 10, 2020      Lisa Y. Cooksey, DIONICIO  [address]  [place]  [place] [address]           [facility]  [address]  [place] [address]  Phone: [phone]  Fax: [phone]          Patient: Zunilda Alvarez   MR Number: [id number]   YOB: 1965   Date of Visit: 7/10/2020       Dear Lisa Y. Cooksey:    Thank you for referring Zunilda Alvarez to me for evaluation. Attached you will find relevant portions of my assessment and plan of care.    If you have questions, please do not hesitate to call me. I look forward to following Zunilda Alvarez along with you.    Sincerely,    Carter Rice MD    Enclosure  CC:  No Recipients    If you would like to receive this communication electronically, please contact [email or web address] or [phone] to request more information on [employer] Link access.    For providers and/or their staff who would like to refer a patient to [facility], please contact us through our one-stop-shop provider referral line, [facility], at [phone].    If you feel you have received this communication in error or would no longer like to receive these types of communications, please e-mail [email or web address]

## 2020-07-10 NOTE — PROGRESS NOTES
Subjective:       Patient ID: Zunilda Alvarez is a 55 y.o. female.    Chief Complaint: Follow-up    HPI    Allergies:  Review of patient's allergies indicates:  No Known Allergies    Medications:    Current Outpatient Medications:     aspirin (ECOTRIN) 81 MG EC tablet, Take 81 mg by mouth once daily., Disp: , Rfl:     blood-glucose meter kit, TID testing of blood sugar Type II DM with long term use of insulin, Disp: 1 each, Rfl: 0    colestipol (COLESTID) 1 gram Tab, Take 1 tablet (1 g total) by mouth once daily., Disp: 90 tablet, Rfl: 3    CONTOUR NEXT TEST STRIPS Strp, USE 1 STRIP TO CHECK GLUCOSE THREE TIMES DAILY, Disp: 270 each, Rfl: 11    escitalopram oxalate (LEXAPRO) 10 MG tablet, TAKE 1 TABLET BY MOUTH ONCE DAILY FOR 30 DAYS, Disp: , Rfl: 11    famotidine (PEPCID) 40 MG tablet, Take 1 tablet (40 mg total) by mouth once daily., Disp: 30 tablet, Rfl: 11    fluticasone propionate (FLONASE) 50 mcg/actuation nasal spray, 2 sprays (100 mcg total) by Each Nare route once daily., Disp: 1 Bottle, Rfl: 2    JARDIANCE 25 mg Tab, Take 1 tablet by mouth once daily, Disp: 90 tablet, Rfl: 1    LANTUS SOLOSTAR U-100 INSULIN glargine 100 units/mL (3mL) SubQ pen, INJECT 70 UNITS SUBCUTANEOUSLY IN THE EVENING, Disp: 15 mL, Rfl: 11    lisinopril-hydrochlorothiazide (PRINZIDE,ZESTORETIC) 10-12.5 mg per tablet, Take 1 tablet by mouth once daily., Disp: , Rfl:     loratadine-pseudoephedrine 5-120 mg (CLARITIN-D 12 HOUR) 5-120 mg per tablet, Claritin-D 12 Hour 5 mg-120 mg tablet,extended release  Take 1 tablet every 12 hours by oral route., Disp: , Rfl:     metFORMIN (GLUCOPHAGE) 1000 MG tablet, TAKE 1 TABLET BY MOUTH TWICE DAILY WITH MEALS, Disp: 180 tablet, Rfl: 3    metoprolol succinate (TOPROL-XL) 50 MG 24 hr tablet, Take 50 mg by mouth 2 (two) times daily. , Disp: , Rfl:     NOVOLOG FLEXPEN U-100 INSULIN 100 unit/mL (3 mL) InPn pen, INJECT 18 UNITS SUBCUTANEOUSLY THREE TIMES DAILY WITH MEALS, Disp: 15 mL,  Rfl: 5    omeprazole (PRILOSEC) 40 MG capsule, Take 40 mg by mouth once daily., Disp: , Rfl:     OZEMPIC 1 mg/dose (2 mg/1.5 mL) PnIj, INJECT 1 MG INTO THE SKIN EVERY 7 DAYS, Disp: 4 Syringe, Rfl: 3    pravastatin (PRAVACHOL) 20 MG tablet, Take 20 mg by mouth once daily., Disp: , Rfl:     cimetidine (TAGAMET) 400 MG tablet, Take 1 tablet (400 mg total) by mouth nightly., Disp: 90 tablet, Rfl: 3    Past Medical History:   Diagnosis Date    Diabetes mellitus type 2, insulin dependent     High cholesterol     Hypertension     Sleep apnea        Past Surgical History:   Procedure Laterality Date    COLONOSCOPY N/A 9/10/2019    Procedure: COLONOSCOPY;  Surgeon: Carter Rice MD;  Location: [facility];  Service: Endoscopy;  Laterality: N/A;    ESOPHAGOGASTRODUODENOSCOPY N/A 9/10/2019    Procedure: EGD (ESOPHAGOGASTRODUODENOSCOPY);  Surgeon: Carter Rice MD;  Location: [facility];  Service: Endoscopy;  Laterality: N/A;    HERNIA REPAIR      HYSTERECTOMY  1998         Review of Systems   Constitutional: Negative for appetite change, fever and unexpected weight change.   HENT: Negative for trouble swallowing.         No jaundice.   Respiratory: Negative for cough, shortness of breath and wheezing.         No Rales, Rhonchi, or Dyspnea.   Cardiovascular: Negative for chest pain.   Gastrointestinal: Positive for abdominal pain. Negative for abdominal distention, anal bleeding, blood in stool, constipation, diarrhea, nausea and rectal pain.        She states that she was symptom-free on the PPI in the morning and the Pepcid at night.  Apparently the Pepcid is no longer available and she has had some breakthrough symptoms.  She wants an alternative.  She denies hematemesis hematochezia dysphagia aspiration.  She denies fever chills   Endocrine:        She states her diabetes well controlled.  She is on the ADA diet.  She realizes that she is overweight and she is trying to reduce her weight by decreasing the caloric  intake.   Genitourinary:        She has been evaluated by her gynecologist.  She has an ovarian cyst.  She states that she is scheduled for pelvic ultrasound.  She is going to see the urologist for the adrenal adenoma.   Musculoskeletal: Positive for arthralgias and back pain. Negative for neck pain.   Skin: Negative for pallor and rash.   Neurological: Negative for dizziness, seizures, syncope, speech difficulty, weakness and numbness.   Hematological: Negative for adenopathy.   Psychiatric/Behavioral: Negative for confusion.       Objective:      Physical Exam  Vitals signs reviewed.   Constitutional:       Appearance: She is well-developed.      Comments: Well-nourished well-hydrated afebrile nonicteric obese white female.  She is oriented x3.  She can relate her history and answer questions appropriately.  She is sitting comfortably in the chair.  Normocephalic.  Pupils are   HENT:      Head: Normocephalic.   Eyes:      Pupils: Pupils are equal, round, and reactive to light.   Neck:      Musculoskeletal: Normal range of motion and neck supple.      Thyroid: No thyromegaly.      Trachea: No tracheal deviation.   Cardiovascular:      Rate and Rhythm: Normal rate and regular rhythm.      Heart sounds: Normal heart sounds.   Pulmonary:      Effort: Pulmonary effort is normal.      Breath sounds: Normal breath sounds.   Abdominal:      General: Bowel sounds are normal. There is no distension.      Palpations: Abdomen is soft. There is no mass.      Tenderness: There is no abdominal tenderness. There is no right CVA tenderness, left CVA tenderness, guarding or rebound.      Hernia: No hernia is present.      Comments: Abdomen is obese without tenderness masses organomegaly.  Bowel sounds are normal.   Musculoskeletal: Normal range of motion.      Comments: She can ambulate normally.  She can go from the sitting the standing position without difficulty.   Lymphadenopathy:      Cervical: No cervical adenopathy.    Skin:     General: Skin is warm and dry.   Neurological:      Mental Status: She is alert and oriented to person, place, and time.      Cranial Nerves: No cranial nerve deficit.   Psychiatric:         Behavior: Behavior normal.           Plan:       Gastroesophageal reflux disease, esophagitis presence not specified  -     cimetidine (TAGAMET) 400 MG tablet; Take 1 tablet (400 mg total) by mouth nightly.  Dispense: 90 tablet; Refill: 3    Gastroesophageal reflux disease with esophagitis    Obesity (BMI 35.0-39.9 without comorbidity)     She will continue her reflux regimen.  She takes her PPI in the morning she started on the Tagamet in the evening.  She will make a food diary and avoid the offending foods.  She will continue her ADA diet.  I have encouraged weight reduction.  She will try to decrease her caloric intake.  She follows up with her gynecologist and urologist.  She continues her fiber supplements and stool softeners.  She wants to hold off further GI evaluation at this point.

## 2020-07-10 NOTE — PATIENT INSTRUCTIONS
She will continue her reflux regimen.  She started on the Tagamet because the Pepcid is unavailable.  She was symptom-free a when she was on the current medications.  I have encouraged weight reduction she stays on her diabetic diet.  She continues her bowel program to avoid constipation.  She follows up with her gynecologist for the ovarian cyst.  She continues her vitamins minerals and usual activities.

## 2020-07-23 ENCOUNTER — OFFICE VISIT (OUTPATIENT)
Dept: ENDOCRINOLOGY | Facility: CLINIC | Age: 55
End: 2020-07-23
Payer: COMMERCIAL

## 2020-07-23 VITALS
DIASTOLIC BLOOD PRESSURE: 70 MMHG | SYSTOLIC BLOOD PRESSURE: 118 MMHG | TEMPERATURE: 99 F | BODY MASS INDEX: 40.06 KG/M2 | HEART RATE: 85 BPM | WEIGHT: 233.38 LBS

## 2020-07-23 DIAGNOSIS — I10 ESSENTIAL HYPERTENSION: ICD-10-CM

## 2020-07-23 DIAGNOSIS — E27.8 ADRENAL NODULE: Primary | ICD-10-CM

## 2020-07-23 DIAGNOSIS — E66.01 CLASS 3 SEVERE OBESITY DUE TO EXCESS CALORIES WITH SERIOUS COMORBIDITY AND BODY MASS INDEX (BMI) OF 40.0 TO 44.9 IN ADULT: ICD-10-CM

## 2020-07-23 PROCEDURE — 99214 OFFICE O/P EST MOD 30 MIN: CPT | Mod: S$GLB,,, | Performed by: INTERNAL MEDICINE

## 2020-07-23 PROCEDURE — 99214 PR OFFICE/OUTPT VISIT, EST, LEVL IV, 30-39 MIN: ICD-10-PCS | Mod: S$GLB,,, | Performed by: INTERNAL MEDICINE

## 2020-07-23 PROCEDURE — 99999 PR PBB SHADOW E&M-EST. PATIENT-LVL V: CPT | Mod: PBBFAC,,, | Performed by: INTERNAL MEDICINE

## 2020-07-23 PROCEDURE — 99999 PR PBB SHADOW E&M-EST. PATIENT-LVL V: ICD-10-PCS | Mod: PBBFAC,,, | Performed by: INTERNAL MEDICINE

## 2020-07-23 NOTE — PATIENT INSTRUCTIONS
For the adrenal gland: Repeat scan sometime next month.    also try to get midnight salivary cortisol.    For the diabetes: keep up with primary care. If needed, you can reach out for an earlier appointment and we can try to work together on helping you get the sugar down. But your last A1C was getting pretty close.      Healthy Meals for Diabetes     A healthcare provider will help you develop a meal plan that fits your needs.   Ask your healthcare team to help you make a meal plan that fits your needs. Your meal plan tells you when to eat your meals and snacks, what kinds of foods to eat, and how much of each food to eat. You dont have to give up all the foods you like. But you do need to follow some guidelines.  Choose healthy carbohydrates  Starches, sugars, and fiber are all types of carbohydrates. Fiber can help lower your cholesterol and triglycerides. Fiber is also healthy for your heart. You should have 20 to 35 grams of total fiber each day. Fiber-rich foods include:  · Whole-grain breads and cereals  · Bulgur wheat  · Brown rice     · Whole-wheat pasta  · Fruits and vegetables  · Dry beans, and peas   Keep track of the amount of carbohydrates you eat. This can help you keep the right balance of physical activity and medicine. The amount of carbohydrates needed will vary for each person. It depends on many things such as your health, the medicines you take, and how active you are. Your healthcare team will help you figure out the right amount of carbohydrates for you. You may start with around 45 to 60 grams of carbohydrates per meal, depending on your situation.   Here are some examples of foods containing about 15 grams of carbohydrates (1 serving of carbohydrates):  · 1/2 cup of canned or frozen fruit  · A small piece of fresh fruit (4 ounces)  · 1 slice of bread  · 1/2 cup of oatmeal  · 1/3 cup of rice  · 4 to 6 crackers  · 1/2 English muffin  · 1/2 cup of black beans  · 1/4 of a large baked potato (3  ounces)  · 2/3 cup of plain fat-free yogurt  · 1 cup of soup  · 1/2 cup of casserole  · 6 chicken nuggets  · 2-inch-square brownie or cake without frosting  · 2 small cookies  · 1/2 cup of ice cream or sherbet  Choose healthy protein foods  Eating protein that is low in fat can help you control your weight. It also helps keep your heart healthy. Low-fat protein foods include:  · Fish  · Plant proteins, such as dry beans and peas, nuts, and soy products like tofu and soymilk  · Lean meat with all visible fat removed  · Poultry with the skin removed  · Low-fat or nonfat milk, cheese, and yogurt  Limit unhealthy fats and sugar  Saturated and trans fats are unhealthy for your heart. They raise LDL (bad) cholesterol. Fat is also high in calories, so it can make you gain weight. To cut down on unhealthy fats and sugar, limit these foods:  · Butter or margarine  · Palm and palm kernel oils and coconut oil  · Cream  · Cheese  · [place]  · Lunch meats     · Ice cream  · Sweet bakery goods such as pies, muffins, and donuts  · Jams and jellies  · Candy bars  · Regular sodas   How much to eat  The amount of food you eat affects your blood sugar. It also affects your weight. Your healthcare team will tell you how much of each type of food you should eat.  · Use measuring cups and spoons and a food scale to measure serving sizes.  · Learn what a correct serving size looks like on your plate. This will help when you are away from home and cant measure your servings.  · Eat only the number of servings given on your meal plan for each food. Dont take seconds.  · Learn to read food labels. Be sure to look at serving size, total carbohydrates, fiber, calories, sugar, and saturated and trans fats. Look for healthier alternatives to foods that have added sugar.  · Plan ahead for parties so you can still have a good time without going overboard with unhealthy food choices. Set a good example yourself by bringing a healthy dish to pot  ciaran.   Choose healthy snacks  When it comes to snacks, we usually think about foods with added sugar and fats. But there are many other options for healthier snack choices. Here are a few snack ideas to choose from:  Snacks with less than 5 grams of carbohydrates  · 1 piece of string cheese  · 3 celery sticks plus 1 tablespoon of peanut butter  · 5 cherry tomatoes plus 1 tablespoon of ranch dressing  · 1 hard-boiled egg  · 1/4 cup of fresh blueberries  ·  5 baby carrots  · 1 cup of light popcorn  · 1/2 cup of sugar-free gelatin  · 15 almonds  Snacks with about 10 to 20 grams of carbohydrates  · 1/3 cup of hummus plus 1 cup of fresh cut nonstarchy vegetables (carrots, green peppers, broccoli, celery, or a combination)  · 1/2 cup of fresh or canned fruit plus 1/4 cup of cottage cheese  · 1/2 cup of tuna salad with 4 crackers  · 2 rice cakes and a tablespoon of peanut butter  · 1 small apple or orange  · 3 cups light popcorn  · 1/2 of a turkey sandwich (1 slice of whole-wheat bread, 2 ounces of turkey, and mustard)  Portion sizes are important to controlling your blood sugar and staying at a healthy weight. Stock up on healthy snack items so you always have them on hand.  When to eat  Your meal plan will likely include breakfast, lunch, dinner, and some snacks.  · Try to eat your meals and snacks at about the same times each day.  · Eat all your meals and snacks. Skipping a meal or snack can make your blood sugar drop too low. It can also cause you to eat too much at the next meal or snack. Then your blood sugar could get too high.  Date Last Reviewed: 7/1/2016  © 4431-7276 [employer]. [address] [place] [address], [place] [address]. All rights reserved. This information is not intended as a substitute for professional medical care. Always follow your healthcare professional's instructions.

## 2020-07-23 NOTE — LETTER
July 24, 2020        Ivory Pacheco III, MD  [address]  [place] [address]             [place] [facility]  [address] [address]  Phone: [phone]   Patient: Zunilda Alvarez   MR Number: [id number]   YOB: 1965   Date of Visit: 7/23/2020       Dear Dr. Pacheco:    I saw your patient, Zunilda Alvarez, today for evaluation. Attached you will find relevant portions of my assessment and plan of care.       If you have questions, please do not hesitate to call me. I look forward to following Zunilda Alvarez along with you.    Sincerely,      Curt Stone MD            CC  No Recipients    Enclosure

## 2020-07-23 NOTE — PROGRESS NOTES
Subjective:      Chief Complaint: Adrenal Adenoma      HPI: Zunilda Alvarez is a 55 y.o. female who is here for a follow-up evaluation for adrenal nodule. Last seen 10/1/2019.    With regards to the adrenal nodule:   Patient was found to have an incidental adrenal nodule on imaging (CT abd/pelvis w/ and w/o contrast 8/20/2019 performed for concern about gallstones and kidney nodules).     Adrenal lesion imaging characteristics: small enhancing soft tissue nodule, 1.6 cm in diameter. HU, washout not reported  Dedicated adrenal imaging was not done.     Functional evaluation:  2019   Gabriel 7.3, renin 6.8   metanephrines normal   catecholamines slightly low   Dex suppression Cortisol 10   24 hr urine cortisol 25.  Volume 2700 mL.    She does have hx HTN:   Lisinopril-HCTZ (10-12.5), metoprolol 50   No hx hypertensive emergency.     Also has diabetes. Last A1C 8.3 (was 9.9 last time), on basal/bolus insulin, empagliflozin, metformin, ozempic. Follows with PCP.   denies polyuria, polydipsia. Vision better.     Today, pt reports feeling okay overall. No abd pain. Pt denies hx paroxysmal symptoms such as syncope, pallor, and dizziness. Rare palpitations, mostly with coffee.  No easy bruisability or abnormal stretch marks. No muscle weakness. had lost some weight, gained most of it back lately though. No other acute complaints today.    Reviewed past medical, family, social history and updated as appropriate.    Review of Systems   Constitutional: Positive for unexpected weight change (had lost weight then gained back).   HENT: Negative for trouble swallowing.    Eyes: Negative for visual disturbance.   Respiratory: Negative for shortness of breath.    Cardiovascular: Positive for palpitations (some with coffee).   Gastrointestinal: Positive for diarrhea (some). Negative for abdominal pain.   Endocrine: Negative for polydipsia and polyuria.   Genitourinary: Negative for dysuria and frequency.   Musculoskeletal:  Negative for back pain.   Neurological: Negative for light-headedness and headaches.   Psychiatric/Behavioral: Negative for sleep disturbance.     Objective:     Vitals:    07/23/20 1417   BP: 118/70   Pulse: 85   Temp: 98.7 °F (37.1 °C)     BP Readings from Last 5 Encounters:   07/23/20 118/70   07/10/20 111/72   05/14/20 126/82   01/16/20 128/78   10/08/19 132/88     Physical Exam  Vitals signs reviewed.   Constitutional:       General: She is not in acute distress.     Appearance: She is obese.   Neck:      Thyroid: No thyromegaly.   Cardiovascular:      Heart sounds: Normal heart sounds.   Pulmonary:      Effort: Pulmonary effort is normal.       Wt Readings from Last 10 Encounters:   07/23/20 1417 105.8 kg (233 lb 5.7 oz)   07/10/20 0725 103.9 kg (229 lb)   05/14/20 1352 103 kg (227 lb)   01/16/20 1342 103 kg (227 lb)   10/08/19 1429 104.3 kg (230 lb)   10/01/19 0837 104 kg (229 lb 4.5 oz)   09/18/19 1329 106.1 kg (234 lb)   09/10/19 1407 103 kg (227 lb)   09/03/19 0841 103 kg (227 lb)   08/07/19 1302 107 kg (236 lb)     Lab Results   Component Value Date    HGBA1C 8.3 (H) 05/07/2020     Lab Results   Component Value Date    CHOL 136 05/07/2020    HDL 39 (L) 05/07/2020    LDLCALC 72 05/07/2020    TRIG 173 (H) 05/07/2020    CHOLHDL 3.5 05/07/2020     Lab Results   Component Value Date     05/07/2020    K 4.3 05/07/2020     05/07/2020    CO2 26 05/07/2020     05/07/2020    BUN 21 05/07/2020    CREATININE 0.60 05/07/2020    CALCIUM 9.7 05/07/2020    PROT 6.6 05/07/2020    ALBUMIN 4.2 05/07/2020    BILITOT 0.3 05/07/2020    ALKPHOS 69 05/07/2020    AST 21 05/07/2020    ALT 33 (H) 05/07/2020    ANIONGAP 15 10/01/2019    ESTGFRAFRICA 121 05/07/2020    EGFRNONAA 104 05/07/2020    TSH 2.75 05/07/2020      Assessment/Plan:     Adrenal nodule  Left adrenal nodule seen on CT   - clinically, obese and uncontrolled diabetes. Well controlled HTN. No other symptoms   - recheck CT later this summer to ensure  stability, HU okay, etc   - biochemical evaluation negative (dex suppression high but 24 hr urine cortisol normal)   - will recheck cortisol, this time with [place] salivary   - continue to monitor      Class 3 severe obesity due to excess calories with serious comorbidity and body mass index (BMI) of 40.0 to 44.9 in adult  BMI elevated, 40   - complicated by HTN, uncontrolled DM2   - evaluate cortisol as above  - continue GLP1 with diabetes with PCP   - pt could consider weight loss surgery if interested    Hypertension  bp controlled today   - alvin, renin okay   - continue meds, f/u with PCP        Follow up in about 1 year (around 7/23/2021).      Curt Stone MD  Endocrinology

## 2020-07-24 PROBLEM — E66.813 CLASS 3 SEVERE OBESITY DUE TO EXCESS CALORIES WITH SERIOUS COMORBIDITY AND BODY MASS INDEX (BMI) OF 40.0 TO 44.9 IN ADULT: Status: ACTIVE | Noted: 2019-08-07

## 2020-07-24 NOTE — ASSESSMENT & PLAN NOTE
BMI elevated, 40   - complicated by HTN, uncontrolled DM2   - evaluate cortisol as above  - continue GLP1 with diabetes with PCP   - pt could consider weight loss surgery if interested

## 2020-07-24 NOTE — ASSESSMENT & PLAN NOTE
Left adrenal nodule seen on CT   - clinically, obese and uncontrolled diabetes. Well controlled HTN. No other symptoms   - recheck CT later this summer to ensure stability, HU okay, etc   - biochemical evaluation negative (dex suppression high but 24 hr urine cortisol normal)   - will recheck cortisol, this time with [place] salivary   - continue to monitor

## 2020-08-12 ENCOUNTER — HOSPITAL ENCOUNTER (OUTPATIENT)
Dept: RADIOLOGY | Facility: HOSPITAL | Age: 55
Discharge: HOME | End: 2020-08-12
Attending: INTERNAL MEDICINE
Payer: COMMERCIAL

## 2020-08-12 DIAGNOSIS — E27.8 ADRENAL NODULE: ICD-10-CM

## 2020-08-12 PROCEDURE — 74178 CT ABD&PLV WO CNTR FLWD CNTR: CPT | Mod: TC

## 2020-08-12 PROCEDURE — 74178 CT ABD&PLV WO CNTR FLWD CNTR: CPT | Mod: 26,,, | Performed by: RADIOLOGY

## 2020-08-12 PROCEDURE — 74178 CT ABDOMEN PELVIS W WO CONTRAST: ICD-10-PCS | Mod: 26,,, | Performed by: RADIOLOGY

## 2020-08-12 PROCEDURE — 25500020 PHARM REV CODE 255: Performed by: INTERNAL MEDICINE

## 2020-08-12 RX ADMIN — IOHEXOL 100 ML: 350 INJECTION, SOLUTION INTRAVENOUS at 11:08

## 2020-08-12 NOTE — PROGRESS NOTES
Please contact patient and let her know:  1. Adrenal nodule is stable.  2. Cyst on the left ovary is smaller than before  3. Stable cyst in the liver.  4. Salivary cortisol is pending, will let her know when that is back.    Thanks,   - Curt

## 2020-09-28 DIAGNOSIS — R19.7 DIARRHEA, UNSPECIFIED TYPE: ICD-10-CM

## 2020-09-29 RX ORDER — MONTELUKAST SODIUM 4 MG/1
TABLET, CHEWABLE ORAL
Qty: 30 TABLET | Refills: 0 | Status: SHIPPED | OUTPATIENT
Start: 2020-09-29 | End: 2020-10-27

## 2020-10-13 ENCOUNTER — OFFICE VISIT (OUTPATIENT)
Dept: GASTROENTEROLOGY | Facility: CLINIC | Age: 55
End: 2020-10-13
Payer: COMMERCIAL

## 2020-10-13 VITALS
TEMPERATURE: 98 F | RESPIRATION RATE: 18 BRPM | HEIGHT: 64 IN | DIASTOLIC BLOOD PRESSURE: 76 MMHG | WEIGHT: 233 LBS | OXYGEN SATURATION: 95 % | HEART RATE: 74 BPM | SYSTOLIC BLOOD PRESSURE: 132 MMHG | BODY MASS INDEX: 39.78 KG/M2

## 2020-10-13 DIAGNOSIS — E66.01 CLASS 3 SEVERE OBESITY DUE TO EXCESS CALORIES WITH SERIOUS COMORBIDITY AND BODY MASS INDEX (BMI) OF 40.0 TO 44.9 IN ADULT: Primary | ICD-10-CM

## 2020-10-13 DIAGNOSIS — K76.0 FATTY LIVER: ICD-10-CM

## 2020-10-13 DIAGNOSIS — K21.9 GASTROESOPHAGEAL REFLUX DISEASE, UNSPECIFIED WHETHER ESOPHAGITIS PRESENT: ICD-10-CM

## 2020-10-13 DIAGNOSIS — K57.90 DIVERTICULOSIS: ICD-10-CM

## 2020-10-13 DIAGNOSIS — E27.8 ADRENAL NODULE: ICD-10-CM

## 2020-10-13 DIAGNOSIS — Z80.0 FAMILY HISTORY OF COLON CANCER: ICD-10-CM

## 2020-10-13 PROCEDURE — 99999 PR PBB SHADOW E&M-EST. PATIENT-LVL V: ICD-10-PCS | Mod: PBBFAC,,, | Performed by: INTERNAL MEDICINE

## 2020-10-13 PROCEDURE — 99213 OFFICE O/P EST LOW 20 MIN: CPT | Mod: S$GLB,,, | Performed by: INTERNAL MEDICINE

## 2020-10-13 PROCEDURE — 3008F BODY MASS INDEX DOCD: CPT | Mod: S$GLB,,, | Performed by: INTERNAL MEDICINE

## 2020-10-13 PROCEDURE — 99213 PR OFFICE/OUTPT VISIT, EST, LEVL III, 20-29 MIN: ICD-10-PCS | Mod: S$GLB,,, | Performed by: INTERNAL MEDICINE

## 2020-10-13 PROCEDURE — 3008F PR BODY MASS INDEX (BMI) DOCUMENTED: ICD-10-PCS | Mod: S$GLB,,, | Performed by: INTERNAL MEDICINE

## 2020-10-13 PROCEDURE — 99999 PR PBB SHADOW E&M-EST. PATIENT-LVL V: CPT | Mod: PBBFAC,,, | Performed by: INTERNAL MEDICINE

## 2020-10-13 NOTE — PATIENT INSTRUCTIONS
She will continue her reflux regimen and current medications.  She continues the diabetic diet and will make a food diary and avoid the offending foods.  She takes her vitamins and minerals particularly vitamin-E.  She follows up with her PCP who has scheduled labs and mammogram.  I have requested a copy.  She continues the the bowel program with additional fiber.

## 2020-10-13 NOTE — PROGRESS NOTES
Subjective:       Patient ID: Zunilda Alvarez is a 55 y.o. female.    Chief Complaint: Follow-up (3m-gerd)    She has a history of gastroesophageal reflux gastritis and diverticulosis.  She has had a recent upper endoscopy and colonoscopy.  She has diabetes and states the diabetes is well controlled.  Recent CT scan shows that the adrenal adenoma is stable.  She has a fatty liver.  She has tried reduce her weight but is found it very difficult.  She has made a food diary and she avoids the offending foods.  She has tried reduce her caloric intake.  She know she has a fatty liver and has been taking her vitamins including vitamin-E.  She generally has daily bowel movements.  The ovarian cyst has decreased in size.  She was evaluated by gynecologist  and told that is ovarian cyst is decreased.  She denies pelvic pain.  She is scheduled to see the PCP she states he does the mammograms.  She denies breast lesions.      Allergies:  Review of patient's allergies indicates:  No Known Allergies    Medications:    Current Outpatient Medications:     aspirin (ECOTRIN) 81 MG EC tablet, Take 81 mg by mouth once daily., Disp: , Rfl:     blood-glucose meter kit, TID testing of blood sugar Type II DM with long term use of insulin, Disp: 1 each, Rfl: 0    cimetidine (TAGAMET) 400 MG tablet, Take 1 tablet (400 mg total) by mouth nightly., Disp: 90 tablet, Rfl: 3    colestipoL (COLESTID) 1 gram Tab, Take 1 tablet by mouth once daily, Disp: 30 tablet, Rfl: 0    escitalopram oxalate (LEXAPRO) 10 MG tablet, TAKE 1 TABLET BY MOUTH ONCE DAILY FOR 30 DAYS, Disp: , Rfl: 11    fluticasone propionate (FLONASE) 50 mcg/actuation nasal spray, 2 sprays (100 mcg total) by Each Nare route once daily., Disp: 1 Bottle, Rfl: 2    insulin (LANTUS SOLOSTAR U-100 INSULIN) glargine 100 units/mL (3mL) SubQ pen, Inject 80 Units into the skin every evening., Disp: 15 mL, Rfl: 11    JARDIANCE 25 mg Tab, Take 1 tablet by mouth once daily,  Disp: 90 tablet, Rfl: 1    lisinopril-hydrochlorothiazide (PRINZIDE,ZESTORETIC) 10-12.5 mg per tablet, Take 1 tablet by mouth once daily., Disp: , Rfl:     loratadine-pseudoephedrine 5-120 mg (CLARITIN-D 12 HOUR) 5-120 mg per tablet, Claritin-D 12 Hour 5 mg-120 mg tablet,extended release  Take 1 tablet every 12 hours by oral route., Disp: , Rfl:     metFORMIN (GLUCOPHAGE) 1000 MG tablet, TAKE 1 TABLET BY MOUTH TWICE DAILY WITH MEALS, Disp: 180 tablet, Rfl: 3    metoprolol succinate (TOPROL-XL) 50 MG 24 hr tablet, Take 50 mg by mouth 2 (two) times daily. , Disp: , Rfl:     NOVOLOG FLEXPEN U-100 INSULIN 100 unit/mL (3 mL) InPn pen, INJECT 18 UNITS SUBCUTANEOUSLY THREE TIMES DAILY WITH MEALS, Disp: 15 mL, Rfl: 5    omeprazole (PRILOSEC) 40 MG capsule, Take 40 mg by mouth once daily., Disp: , Rfl:     OZEMPIC 1 mg/dose (2 mg/1.5 mL) PnIj, INJECT 1 MG SUBCUTANEOUSLY ONCE EVERY 7 DAYS, Disp: 6 Syringe, Rfl: 3    pravastatin (PRAVACHOL) 20 MG tablet, Take 20 mg by mouth once daily., Disp: , Rfl:     famotidine (PEPCID) 40 MG tablet, Take 1 tablet (40 mg total) by mouth once daily. (Patient not taking: Reported on 10/13/2020), Disp: 30 tablet, Rfl: 11    Past Medical History:   Diagnosis Date    Diabetes mellitus type 2, insulin dependent     High cholesterol     Hypertension     Sleep apnea        Past Surgical History:   Procedure Laterality Date    COLONOSCOPY N/A 9/10/2019    Procedure: COLONOSCOPY;  Surgeon: Carter Rice MD;  Location: [facility];  Service: Endoscopy;  Laterality: N/A;    ESOPHAGOGASTRODUODENOSCOPY N/A 9/10/2019    Procedure: EGD (ESOPHAGOGASTRODUODENOSCOPY);  Surgeon: Carter Rice MD;  Location: [facility];  Service: Endoscopy;  Laterality: N/A;    HERNIA REPAIR      HYSTERECTOMY  1998         Review of Systems   Respiratory:        She has never used tobacco.  Currently she is not using tobacco alcohol.  She denies dysphagia aspiration hemoptysis chronic cough chronic sputum  production or dyspnea on exertion.   Cardiovascular:        She states her hypertension hyperlipidemia well controlled.  She denies exertional chest pain or rhythm disturbance.  The recent CT scan showed coronary artery calcification.   Gastrointestinal: Positive for nausea. Negative for abdominal distention, abdominal pain, anal bleeding, blood in stool, constipation, diarrhea and rectal pain.        She has occasional nausea but she denies vomiting.  She cannot pinpoint a precipitating factor.       Objective:      Physical Exam  Vitals signs reviewed.   Constitutional:       Appearance: She is well-developed.      Comments: Well-nourished well-hydrated afebrile nonicteric overweight white female.  She is sitting comfortably in the chair.  She is normocephalic.  Pupils are normal.  She is oriented x3 and can relate her history and answer questions appropriately.   HENT:      Head: Normocephalic.   Eyes:      Pupils: Pupils are equal, round, and reactive to light.   Neck:      Musculoskeletal: Normal range of motion and neck supple.      Thyroid: No thyromegaly.      Trachea: No tracheal deviation.   Cardiovascular:      Rate and Rhythm: Normal rate and regular rhythm.      Heart sounds: Normal heart sounds.   Pulmonary:      Effort: Pulmonary effort is normal.      Breath sounds: Normal breath sounds.   Abdominal:      General: There is no distension.      Palpations: Abdomen is soft. There is no mass.      Tenderness: There is no abdominal tenderness. There is no right CVA tenderness, left CVA tenderness, guarding or rebound.      Hernia: No hernia is present.      Comments: The abdomen is soft obese without tenderness masses organomegaly.  Bowel sounds are normal.   Musculoskeletal: Normal range of motion.      Comments: Ambulate normally.  She can go from the sitting the standing position without difficulty.   Lymphadenopathy:      Cervical: No cervical adenopathy.   Skin:     General: Skin is warm and dry.    Neurological:      Mental Status: She is alert and oriented to person, place, and time.      Cranial Nerves: No cranial nerve deficit.   Psychiatric:         Behavior: Behavior normal.           Plan:       Class 3 severe obesity due to excess calories with serious comorbidity and body mass index (BMI) of 40.0 to 44.9 in adult    Family history of colon cancer    Adrenal nodule    Gastroesophageal reflux disease, unspecified whether esophagitis present    Fatty liver    Diverticulosis     she will continue the diabetic diet reflux regimen current medications vitamins and minerals.  I have encouraged weight reduction.  She is trying to decrease her caloric intake.  She made a food diary and she is avoiding the offending foods such as the fatty foods.  She follows up with her PCP for further routine evaluation.  She continues her bowel program with additional fiber.

## 2020-10-13 NOTE — LETTER
October 13, 2020      Ivory Pacheco III, MD  [address]  [place] [address]           [facility]  [place] [facility]  [address]  [place] [address]  Phone: [phone]  Fax: [phone]          Patient: Zunilda Alvarez   MR Number: [id number]   YOB: 1965   Date of Visit: 10/13/2020       Dear Dr. Ivory Pacheco III:    Thank you for referring Zunilda Alvarez to me for evaluation. Attached you will find relevant portions of my assessment and plan of care.    If you have questions, please do not hesitate to call me. I look forward to following Zunilda Alvarez along with you.    Sincerely,    Carter Rice MD    Enclosure  CC:  No Recipients    If you would like to receive this communication electronically, please contact [email or web address] or [phone] to request more information on [employer] access.    For providers and/or their staff who would like to refer a patient to [facility], please contact us through our one-stop-shop provider referral line, [facility], at [phone].    If you feel you have received this communication in error or would no longer like to receive these types of communications, please e-mail [email or web address]

## 2020-10-26 DIAGNOSIS — R19.7 DIARRHEA, UNSPECIFIED TYPE: ICD-10-CM

## 2020-10-27 RX ORDER — MONTELUKAST SODIUM 4 MG/1
TABLET, CHEWABLE ORAL
Qty: 30 TABLET | Refills: 0 | Status: SHIPPED | OUTPATIENT
Start: 2020-10-27 | End: 2020-11-25

## 2020-11-11 ENCOUNTER — HOSPITAL ENCOUNTER (OUTPATIENT)
Dept: RADIOLOGY | Facility: HOSPITAL | Age: 55
Discharge: HOME | End: 2020-11-11
Attending: NURSE PRACTITIONER
Payer: COMMERCIAL

## 2020-11-11 VITALS — WEIGHT: 231.5 LBS | HEIGHT: 64 IN | BODY MASS INDEX: 39.52 KG/M2

## 2020-11-11 DIAGNOSIS — Z12.31 ENCOUNTER FOR SCREENING MAMMOGRAM FOR BREAST CANCER: ICD-10-CM

## 2020-11-11 PROCEDURE — 77063 MAMMO DIGITAL SCREENING BILAT WITH TOMOSYNTHESIS_CAD: ICD-10-PCS | Mod: 26,,, | Performed by: RADIOLOGY

## 2020-11-11 PROCEDURE — 77067 SCR MAMMO BI INCL CAD: CPT | Mod: TC

## 2020-11-11 PROCEDURE — 77067 MAMMO DIGITAL SCREENING BILAT WITH TOMOSYNTHESIS_CAD: ICD-10-PCS | Mod: 26,,, | Performed by: RADIOLOGY

## 2020-11-11 PROCEDURE — 77067 SCR MAMMO BI INCL CAD: CPT | Mod: 26,,, | Performed by: RADIOLOGY

## 2020-11-11 PROCEDURE — 77063 BREAST TOMOSYNTHESIS BI: CPT | Mod: 26,,, | Performed by: RADIOLOGY

## 2020-11-24 DIAGNOSIS — R19.7 DIARRHEA, UNSPECIFIED TYPE: ICD-10-CM

## 2020-11-24 PROBLEM — K76.0 FATTY LIVER: Status: RESOLVED | Noted: 2020-10-13 | Resolved: 2020-11-24

## 2020-11-25 RX ORDER — MONTELUKAST SODIUM 4 MG/1
TABLET, CHEWABLE ORAL
Qty: 30 TABLET | Refills: 0 | Status: SHIPPED | OUTPATIENT
Start: 2020-11-25 | End: 2020-12-28

## 2020-12-15 ENCOUNTER — HOSPITAL ENCOUNTER (OUTPATIENT)
Dept: RADIOLOGY | Facility: HOSPITAL | Age: 55
Discharge: HOME | End: 2020-12-15
Attending: NURSE PRACTITIONER
Payer: COMMERCIAL

## 2020-12-15 DIAGNOSIS — R92.8 ABNORMAL MAMMOGRAM OF LEFT BREAST: ICD-10-CM

## 2020-12-15 PROCEDURE — 77065 DX MAMMO INCL CAD UNI: CPT | Mod: 26,LT,, | Performed by: RADIOLOGY

## 2020-12-15 PROCEDURE — 76642 US BREAST LEFT LIMITED: ICD-10-PCS | Mod: 26,LT,, | Performed by: RADIOLOGY

## 2020-12-15 PROCEDURE — 76642 ULTRASOUND BREAST LIMITED: CPT | Mod: TC,LT

## 2020-12-15 PROCEDURE — 77061 BREAST TOMOSYNTHESIS UNI: CPT | Mod: TC,LT

## 2020-12-15 PROCEDURE — 76642 ULTRASOUND BREAST LIMITED: CPT | Mod: 26,LT,, | Performed by: RADIOLOGY

## 2020-12-15 PROCEDURE — 77061 BREAST TOMOSYNTHESIS UNI: CPT | Mod: 26,LT,, | Performed by: RADIOLOGY

## 2020-12-15 PROCEDURE — 77065 MAMMO DIGITAL DIAGNOSTIC LEFT WITH TOMO: ICD-10-PCS | Mod: 26,LT,, | Performed by: RADIOLOGY

## 2020-12-15 PROCEDURE — 77061 MAMMO DIGITAL DIAGNOSTIC LEFT WITH TOMO: ICD-10-PCS | Mod: 26,LT,, | Performed by: RADIOLOGY

## 2020-12-27 DIAGNOSIS — R19.7 DIARRHEA, UNSPECIFIED TYPE: ICD-10-CM

## 2020-12-28 RX ORDER — MONTELUKAST SODIUM 4 MG/1
TABLET, CHEWABLE ORAL
Qty: 30 TABLET | Refills: 0 | Status: SHIPPED | OUTPATIENT
Start: 2020-12-28 | End: 2021-02-01

## 2021-01-31 DIAGNOSIS — R19.7 DIARRHEA, UNSPECIFIED TYPE: ICD-10-CM

## 2021-02-01 RX ORDER — MONTELUKAST SODIUM 4 MG/1
TABLET, CHEWABLE ORAL
Qty: 30 TABLET | Refills: 0 | Status: SHIPPED | OUTPATIENT
Start: 2021-02-01 | End: 2021-03-02

## 2021-03-01 DIAGNOSIS — R19.7 DIARRHEA, UNSPECIFIED TYPE: ICD-10-CM

## 2021-03-02 RX ORDER — MONTELUKAST SODIUM 4 MG/1
TABLET, CHEWABLE ORAL
Qty: 30 TABLET | Refills: 0 | Status: SHIPPED | OUTPATIENT
Start: 2021-03-02 | End: 2021-03-29

## 2021-03-29 DIAGNOSIS — R19.7 DIARRHEA, UNSPECIFIED TYPE: ICD-10-CM

## 2021-03-29 RX ORDER — MONTELUKAST SODIUM 4 MG/1
TABLET, CHEWABLE ORAL
Qty: 30 TABLET | Refills: 0 | Status: SHIPPED | OUTPATIENT
Start: 2021-03-29 | End: 2021-05-03

## 2021-05-03 DIAGNOSIS — R19.7 DIARRHEA, UNSPECIFIED TYPE: ICD-10-CM

## 2021-05-03 RX ORDER — MONTELUKAST SODIUM 4 MG/1
TABLET, CHEWABLE ORAL
Qty: 30 TABLET | Refills: 0 | Status: SHIPPED | OUTPATIENT
Start: 2021-05-03 | End: 2021-06-07

## 2021-06-04 PROBLEM — E66.9 OBESITY (BMI 35.0-39.9 WITHOUT COMORBIDITY): Status: RESOLVED | Noted: 2020-07-10 | Resolved: 2021-06-04

## 2021-06-07 DIAGNOSIS — R19.7 DIARRHEA, UNSPECIFIED TYPE: ICD-10-CM

## 2021-06-07 RX ORDER — MONTELUKAST SODIUM 4 MG/1
TABLET, CHEWABLE ORAL
Qty: 30 TABLET | Refills: 0 | Status: SHIPPED | OUTPATIENT
Start: 2021-06-07 | End: 2021-06-16

## 2021-06-16 DIAGNOSIS — R19.7 DIARRHEA, UNSPECIFIED TYPE: ICD-10-CM

## 2021-06-16 RX ORDER — MONTELUKAST SODIUM 4 MG/1
TABLET, CHEWABLE ORAL
Qty: 30 TABLET | Refills: 0 | Status: SHIPPED | OUTPATIENT
Start: 2021-06-16 | End: 2021-08-31

## 2021-07-05 DIAGNOSIS — K21.9 GASTROESOPHAGEAL REFLUX DISEASE: ICD-10-CM

## 2021-07-06 RX ORDER — CIMETIDINE 400 MG/1
TABLET, FILM COATED ORAL
Qty: 90 TABLET | Refills: 0 | Status: SHIPPED | OUTPATIENT
Start: 2021-07-06 | End: 2021-10-05

## 2021-08-30 DIAGNOSIS — R19.7 DIARRHEA, UNSPECIFIED TYPE: ICD-10-CM

## 2021-08-31 RX ORDER — MONTELUKAST SODIUM 4 MG/1
TABLET, CHEWABLE ORAL
Qty: 30 TABLET | Refills: 0 | Status: SHIPPED | OUTPATIENT
Start: 2021-08-31 | End: 2021-10-01

## 2021-09-30 DIAGNOSIS — R19.7 DIARRHEA, UNSPECIFIED TYPE: ICD-10-CM

## 2021-10-01 RX ORDER — MONTELUKAST SODIUM 4 MG/1
TABLET, CHEWABLE ORAL
Qty: 30 TABLET | Refills: 0 | Status: SHIPPED | OUTPATIENT
Start: 2021-10-01 | End: 2021-10-26

## 2021-10-04 DIAGNOSIS — K21.9 GASTROESOPHAGEAL REFLUX DISEASE: ICD-10-CM

## 2021-10-05 RX ORDER — CIMETIDINE 400 MG/1
TABLET, FILM COATED ORAL
Qty: 90 TABLET | Refills: 0 | Status: SHIPPED | OUTPATIENT
Start: 2021-10-05 | End: 2021-11-04 | Stop reason: SDUPTHER

## 2021-10-14 ENCOUNTER — TELEPHONE (OUTPATIENT)
Dept: ENDOCRINOLOGY | Facility: CLINIC | Age: 56
End: 2021-10-14
Payer: COMMERCIAL

## 2021-10-18 ENCOUNTER — TELEPHONE (OUTPATIENT)
Dept: ENDOCRINOLOGY | Facility: CLINIC | Age: 56
End: 2021-10-18

## 2021-10-26 DIAGNOSIS — R19.7 DIARRHEA, UNSPECIFIED TYPE: ICD-10-CM

## 2021-10-26 RX ORDER — MONTELUKAST SODIUM 4 MG/1
TABLET, CHEWABLE ORAL
Qty: 30 TABLET | Refills: 0 | Status: SHIPPED | OUTPATIENT
Start: 2021-10-26 | End: 2021-11-04 | Stop reason: SDUPTHER

## 2021-11-04 ENCOUNTER — OFFICE VISIT (OUTPATIENT)
Dept: GASTROENTEROLOGY | Facility: CLINIC | Age: 56
End: 2021-11-04
Payer: COMMERCIAL

## 2021-11-04 VITALS
SYSTOLIC BLOOD PRESSURE: 128 MMHG | OXYGEN SATURATION: 97 % | BODY MASS INDEX: 38.07 KG/M2 | RESPIRATION RATE: 13 BRPM | HEART RATE: 73 BPM | WEIGHT: 223 LBS | DIASTOLIC BLOOD PRESSURE: 84 MMHG | HEIGHT: 64 IN

## 2021-11-04 DIAGNOSIS — R19.7 DIARRHEA, UNSPECIFIED TYPE: ICD-10-CM

## 2021-11-04 DIAGNOSIS — Z80.0 FAMILY HISTORY OF COLON CANCER: ICD-10-CM

## 2021-11-04 DIAGNOSIS — K76.0 FATTY LIVER: ICD-10-CM

## 2021-11-04 DIAGNOSIS — K21.9 GASTROESOPHAGEAL REFLUX DISEASE: ICD-10-CM

## 2021-11-04 DIAGNOSIS — K76.89 LIVER CYST: Primary | ICD-10-CM

## 2021-11-04 DIAGNOSIS — E66.01 CLASS 2 SEVERE OBESITY DUE TO EXCESS CALORIES WITH SERIOUS COMORBIDITY AND BODY MASS INDEX (BMI) OF 39.0 TO 39.9 IN ADULT: ICD-10-CM

## 2021-11-04 PROBLEM — K57.90 DIVERTICULOSIS: Status: RESOLVED | Noted: 2020-10-13 | Resolved: 2021-11-04

## 2021-11-04 PROCEDURE — 3079F PR MOST RECENT DIASTOLIC BLOOD PRESSURE 80-89 MM HG: ICD-10-PCS | Mod: S$GLB,,, | Performed by: INTERNAL MEDICINE

## 2021-11-04 PROCEDURE — 99214 OFFICE O/P EST MOD 30 MIN: CPT | Mod: S$GLB,,, | Performed by: INTERNAL MEDICINE

## 2021-11-04 PROCEDURE — 3051F PR MOST RECENT HEMOGLOBIN A1C LEVEL 7.0 - < 8.0%: ICD-10-PCS | Mod: S$GLB,,, | Performed by: INTERNAL MEDICINE

## 2021-11-04 PROCEDURE — 1159F PR MEDICATION LIST DOCUMENTED IN MEDICAL RECORD: ICD-10-PCS | Mod: S$GLB,,, | Performed by: INTERNAL MEDICINE

## 2021-11-04 PROCEDURE — 3074F SYST BP LT 130 MM HG: CPT | Mod: S$GLB,,, | Performed by: INTERNAL MEDICINE

## 2021-11-04 PROCEDURE — 99999 PR PBB SHADOW E&M-EST. PATIENT-LVL IV: ICD-10-PCS | Mod: PBBFAC,,, | Performed by: INTERNAL MEDICINE

## 2021-11-04 PROCEDURE — 3074F PR MOST RECENT SYSTOLIC BLOOD PRESSURE < 130 MM HG: ICD-10-PCS | Mod: S$GLB,,, | Performed by: INTERNAL MEDICINE

## 2021-11-04 PROCEDURE — 99999 PR PBB SHADOW E&M-EST. PATIENT-LVL IV: CPT | Mod: PBBFAC,,, | Performed by: INTERNAL MEDICINE

## 2021-11-04 PROCEDURE — 99214 PR OFFICE/OUTPT VISIT, EST, LEVL IV, 30-39 MIN: ICD-10-PCS | Mod: S$GLB,,, | Performed by: INTERNAL MEDICINE

## 2021-11-04 PROCEDURE — 3051F HG A1C>EQUAL 7.0%<8.0%: CPT | Mod: S$GLB,,, | Performed by: INTERNAL MEDICINE

## 2021-11-04 PROCEDURE — 3008F PR BODY MASS INDEX (BMI) DOCUMENTED: ICD-10-PCS | Mod: S$GLB,,, | Performed by: INTERNAL MEDICINE

## 2021-11-04 PROCEDURE — 1159F MED LIST DOCD IN RCRD: CPT | Mod: S$GLB,,, | Performed by: INTERNAL MEDICINE

## 2021-11-04 PROCEDURE — 3008F BODY MASS INDEX DOCD: CPT | Mod: S$GLB,,, | Performed by: INTERNAL MEDICINE

## 2021-11-04 PROCEDURE — 3079F DIAST BP 80-89 MM HG: CPT | Mod: S$GLB,,, | Performed by: INTERNAL MEDICINE

## 2021-11-04 RX ORDER — MONTELUKAST SODIUM 4 MG/1
1 TABLET, CHEWABLE ORAL DAILY
Qty: 90 TABLET | Refills: 3 | Status: SHIPPED | OUTPATIENT
Start: 2021-11-04 | End: 2022-04-06

## 2021-11-04 RX ORDER — CIMETIDINE 400 MG/1
400 TABLET, FILM COATED ORAL NIGHTLY
Qty: 90 TABLET | Refills: 3 | Status: SHIPPED | OUTPATIENT
Start: 2021-11-04 | End: 2022-12-15 | Stop reason: SDUPTHER

## 2021-11-09 ENCOUNTER — LAB VISIT (OUTPATIENT)
Dept: FAMILY MEDICINE | Facility: CLINIC | Age: 56
End: 2021-11-09
Payer: COMMERCIAL

## 2021-11-09 DIAGNOSIS — Z20.822 EXPOSURE TO COVID-19 VIRUS: Primary | ICD-10-CM

## 2021-11-09 LAB — SARS-COV-2 RNA RESP QL NAA+PROBE: NOT DETECTED

## 2021-11-09 PROCEDURE — U0003 INFECTIOUS AGENT DETECTION BY NUCLEIC ACID (DNA OR RNA); SEVERE ACUTE RESPIRATORY SYNDROME CORONAVIRUS 2 (SARS-COV-2) (CORONAVIRUS DISEASE [COVID-19]), AMPLIFIED PROBE TECHNIQUE, MAKING USE OF HIGH THROUGHPUT TECHNOLOGIES AS DESCRIBED BY CMS-2020-01-R: HCPCS | Performed by: FAMILY MEDICINE

## 2021-11-09 PROCEDURE — U0005 INFEC AGEN DETEC AMPLI PROBE: HCPCS | Performed by: FAMILY MEDICINE

## 2021-11-17 ENCOUNTER — HOSPITAL ENCOUNTER (OUTPATIENT)
Dept: RADIOLOGY | Facility: HOSPITAL | Age: 56
Discharge: HOME | End: 2021-11-17
Attending: INTERNAL MEDICINE
Payer: COMMERCIAL

## 2021-11-17 ENCOUNTER — TELEPHONE (OUTPATIENT)
Dept: GASTROENTEROLOGY | Facility: CLINIC | Age: 56
End: 2021-11-17
Payer: COMMERCIAL

## 2021-11-17 DIAGNOSIS — R10.9 ABDOMINAL PAIN, UNSPECIFIED ABDOMINAL LOCATION: ICD-10-CM

## 2021-11-17 DIAGNOSIS — K76.89 LIVER CYST: ICD-10-CM

## 2021-11-17 PROCEDURE — 76700 US EXAM ABDOM COMPLETE: CPT | Mod: TC

## 2021-11-17 PROCEDURE — 76700 US ABDOMEN COMPLETE: ICD-10-PCS | Mod: 26,,, | Performed by: RADIOLOGY

## 2021-11-17 PROCEDURE — 76700 US EXAM ABDOM COMPLETE: CPT | Mod: 26,,, | Performed by: RADIOLOGY

## 2021-11-18 ENCOUNTER — TELEPHONE (OUTPATIENT)
Dept: GASTROENTEROLOGY | Facility: CLINIC | Age: 56
End: 2021-11-18
Payer: COMMERCIAL

## 2021-11-18 DIAGNOSIS — Z01.812 PRE-PROCEDURE LAB EXAM: Primary | ICD-10-CM

## 2021-11-19 ENCOUNTER — LAB VISIT (OUTPATIENT)
Dept: LAB | Facility: HOSPITAL | Age: 56
End: 2021-11-19
Attending: INTERNAL MEDICINE
Payer: COMMERCIAL

## 2021-11-19 DIAGNOSIS — Z01.812 PRE-PROCEDURE LAB EXAM: ICD-10-CM

## 2021-11-19 LAB
CREAT SERPL-MCNC: 0.7 MG/DL (ref 0.5–1.4)
EST. GFR  (AFRICAN AMERICAN): >60 ML/MIN/1.73 M^2
EST. GFR  (NON AFRICAN AMERICAN): >60 ML/MIN/1.73 M^2

## 2021-11-19 PROCEDURE — 36415 COLL VENOUS BLD VENIPUNCTURE: CPT | Performed by: INTERNAL MEDICINE

## 2021-11-19 PROCEDURE — 82565 ASSAY OF CREATININE: CPT | Performed by: INTERNAL MEDICINE

## 2021-11-24 ENCOUNTER — HOSPITAL ENCOUNTER (OUTPATIENT)
Dept: RADIOLOGY | Facility: HOSPITAL | Age: 56
Discharge: HOME | End: 2021-11-24
Attending: INTERNAL MEDICINE
Payer: COMMERCIAL

## 2021-11-24 DIAGNOSIS — R10.9 ABDOMINAL PAIN, UNSPECIFIED ABDOMINAL LOCATION: ICD-10-CM

## 2021-11-24 PROCEDURE — 74170 CT ABD WO CNTRST FLWD CNTRST: CPT | Mod: TC

## 2021-11-24 PROCEDURE — A9698 NON-RAD CONTRAST MATERIALNOC: HCPCS | Performed by: INTERNAL MEDICINE

## 2021-11-24 PROCEDURE — 25500020 PHARM REV CODE 255: Performed by: INTERNAL MEDICINE

## 2021-11-24 PROCEDURE — 74170 CT ABD WO CNTRST FLWD CNTRST: CPT | Mod: 26,,, | Performed by: RADIOLOGY

## 2021-11-24 PROCEDURE — 74170 CT ABDOMEN W WO CONTRAST: ICD-10-PCS | Mod: 26,,, | Performed by: RADIOLOGY

## 2021-11-24 RX ADMIN — IOHEXOL 500 ML: 9 SOLUTION ORAL at 07:11

## 2021-11-24 RX ADMIN — IOHEXOL 100 ML: 350 INJECTION, SOLUTION INTRAVENOUS at 07:11

## 2021-11-26 ENCOUNTER — TELEPHONE (OUTPATIENT)
Dept: GASTROENTEROLOGY | Facility: CLINIC | Age: 56
End: 2021-11-26
Payer: COMMERCIAL

## 2021-12-06 ENCOUNTER — HOSPITAL ENCOUNTER (OUTPATIENT)
Dept: RADIOLOGY | Facility: HOSPITAL | Age: 56
Discharge: HOME | End: 2021-12-06
Attending: NURSE PRACTITIONER
Payer: COMMERCIAL

## 2021-12-06 VITALS — HEIGHT: 64 IN | WEIGHT: 222.69 LBS | BODY MASS INDEX: 38.02 KG/M2

## 2021-12-06 DIAGNOSIS — Z12.31 ENCOUNTER FOR SCREENING MAMMOGRAM FOR BREAST CANCER: ICD-10-CM

## 2021-12-06 PROCEDURE — 77067 SCR MAMMO BI INCL CAD: CPT | Mod: TC

## 2021-12-06 PROCEDURE — 77067 SCR MAMMO BI INCL CAD: CPT | Mod: 26,,, | Performed by: RADIOLOGY

## 2021-12-06 PROCEDURE — 77063 BREAST TOMOSYNTHESIS BI: CPT | Mod: 26,,, | Performed by: RADIOLOGY

## 2021-12-06 PROCEDURE — 77063 MAMMO DIGITAL SCREENING BILAT WITH TOMO: ICD-10-PCS | Mod: 26,,, | Performed by: RADIOLOGY

## 2021-12-06 PROCEDURE — 77067 MAMMO DIGITAL SCREENING BILAT WITH TOMO: ICD-10-PCS | Mod: 26,,, | Performed by: RADIOLOGY

## 2021-12-09 ENCOUNTER — OFFICE VISIT (OUTPATIENT)
Dept: ENDOCRINOLOGY | Facility: CLINIC | Age: 56
End: 2021-12-09
Payer: COMMERCIAL

## 2021-12-09 VITALS
BODY MASS INDEX: 39.33 KG/M2 | WEIGHT: 230.38 LBS | SYSTOLIC BLOOD PRESSURE: 124 MMHG | OXYGEN SATURATION: 96 % | HEIGHT: 64 IN | HEART RATE: 73 BPM | DIASTOLIC BLOOD PRESSURE: 76 MMHG | TEMPERATURE: 98 F

## 2021-12-09 DIAGNOSIS — I10 PRIMARY HYPERTENSION: ICD-10-CM

## 2021-12-09 DIAGNOSIS — E27.8 OTHER SPECIFIED DISORDERS OF ADRENAL GLAND: ICD-10-CM

## 2021-12-09 DIAGNOSIS — E66.01 CLASS 2 SEVERE OBESITY DUE TO EXCESS CALORIES WITH SERIOUS COMORBIDITY AND BODY MASS INDEX (BMI) OF 39.0 TO 39.9 IN ADULT: ICD-10-CM

## 2021-12-09 DIAGNOSIS — E27.8 ADRENAL NODULE: Primary | ICD-10-CM

## 2021-12-09 PROCEDURE — 99214 OFFICE O/P EST MOD 30 MIN: CPT | Mod: S$GLB,,, | Performed by: INTERNAL MEDICINE

## 2021-12-09 PROCEDURE — 99999 PR PBB SHADOW E&M-EST. PATIENT-LVL V: ICD-10-PCS | Mod: PBBFAC,,, | Performed by: INTERNAL MEDICINE

## 2021-12-09 PROCEDURE — 99214 PR OFFICE/OUTPT VISIT, EST, LEVL IV, 30-39 MIN: ICD-10-PCS | Mod: S$GLB,,, | Performed by: INTERNAL MEDICINE

## 2021-12-09 PROCEDURE — 99999 PR PBB SHADOW E&M-EST. PATIENT-LVL V: CPT | Mod: PBBFAC,,, | Performed by: INTERNAL MEDICINE

## 2022-02-15 ENCOUNTER — LAB VISIT (OUTPATIENT)
Dept: FAMILY MEDICINE | Facility: CLINIC | Age: 57
End: 2022-02-15
Payer: COMMERCIAL

## 2022-02-15 DIAGNOSIS — Z78.9 PATIENT TRAVELS: Primary | ICD-10-CM

## 2022-02-15 LAB
SARS-COV-2 RNA RESP QL NAA+PROBE: NOT DETECTED
SARS-COV-2- CYCLE NUMBER: NORMAL

## 2022-02-15 PROCEDURE — U0003 INFECTIOUS AGENT DETECTION BY NUCLEIC ACID (DNA OR RNA); SEVERE ACUTE RESPIRATORY SYNDROME CORONAVIRUS 2 (SARS-COV-2) (CORONAVIRUS DISEASE [COVID-19]), AMPLIFIED PROBE TECHNIQUE, MAKING USE OF HIGH THROUGHPUT TECHNOLOGIES AS DESCRIBED BY CMS-2020-01-R: HCPCS | Performed by: FAMILY MEDICINE

## 2022-02-15 PROCEDURE — U0005 INFEC AGEN DETEC AMPLI PROBE: HCPCS | Performed by: FAMILY MEDICINE

## 2022-02-15 NOTE — PROGRESS NOTES
Zunilda Alvarez presented to clinic for COVID-19 swab.   Zunilda Alvarez verified x2, name and .   Zunilda Alvarez instructed on what will be completed, asked if ever had COVID-19 swab.   Explained procedure to Zunilda Alvarez.   Specimen obtained.   No questions or concerns voiced further at this time.   Zunilda Alvarez left in satisfactory condition.

## 2022-04-06 DIAGNOSIS — R19.7 DIARRHEA, UNSPECIFIED TYPE: ICD-10-CM

## 2022-04-06 RX ORDER — MONTELUKAST SODIUM 4 MG/1
TABLET, CHEWABLE ORAL
Qty: 30 TABLET | Refills: 0 | Status: SHIPPED | OUTPATIENT
Start: 2022-04-06 | End: 2022-05-13

## 2022-05-11 DIAGNOSIS — R19.7 DIARRHEA, UNSPECIFIED TYPE: ICD-10-CM

## 2022-05-13 RX ORDER — MONTELUKAST SODIUM 4 MG/1
TABLET, CHEWABLE ORAL
Qty: 30 TABLET | Refills: 0 | Status: SHIPPED | OUTPATIENT
Start: 2022-05-13 | End: 2022-06-14 | Stop reason: SDUPTHER

## 2022-05-23 ENCOUNTER — TELEPHONE (OUTPATIENT)
Dept: FAMILY MEDICINE | Facility: CLINIC | Age: 57
End: 2022-05-23
Payer: COMMERCIAL

## 2022-06-14 DIAGNOSIS — R19.7 DIARRHEA, UNSPECIFIED TYPE: ICD-10-CM

## 2022-06-14 RX ORDER — MONTELUKAST SODIUM 4 MG/1
1 TABLET, CHEWABLE ORAL DAILY
Qty: 30 TABLET | Refills: 0 | Status: SHIPPED | OUTPATIENT
Start: 2022-06-14 | End: 2022-09-21 | Stop reason: SDUPTHER

## 2022-09-21 DIAGNOSIS — R19.7 DIARRHEA, UNSPECIFIED TYPE: ICD-10-CM

## 2022-09-22 RX ORDER — MONTELUKAST SODIUM 4 MG/1
1 TABLET, CHEWABLE ORAL DAILY
Qty: 30 TABLET | Refills: 0 | Status: SHIPPED | OUTPATIENT
Start: 2022-09-22 | End: 2022-10-25 | Stop reason: SDUPTHER

## 2022-10-25 DIAGNOSIS — R19.7 DIARRHEA, UNSPECIFIED TYPE: ICD-10-CM

## 2022-10-26 RX ORDER — MONTELUKAST SODIUM 4 MG/1
1 TABLET, CHEWABLE ORAL DAILY
Qty: 30 TABLET | Refills: 0 | Status: SHIPPED | OUTPATIENT
Start: 2022-10-26 | End: 2022-12-15 | Stop reason: SDUPTHER

## 2022-12-05 DIAGNOSIS — M25.562 LEFT KNEE PAIN, UNSPECIFIED CHRONICITY: Primary | ICD-10-CM

## 2022-12-09 ENCOUNTER — OFFICE VISIT (OUTPATIENT)
Dept: ORTHOPEDICS | Facility: CLINIC | Age: 57
End: 2022-12-09
Payer: COMMERCIAL

## 2022-12-09 ENCOUNTER — HOSPITAL ENCOUNTER (OUTPATIENT)
Dept: RADIOLOGY | Facility: HOSPITAL | Age: 57
Discharge: HOME | End: 2022-12-09
Attending: ORTHOPAEDIC SURGERY
Payer: COMMERCIAL

## 2022-12-09 VITALS — WEIGHT: 231.94 LBS | HEIGHT: 64 IN | RESPIRATION RATE: 18 BRPM | BODY MASS INDEX: 39.6 KG/M2

## 2022-12-09 DIAGNOSIS — M70.52 PES ANSERINUS BURSITIS OF LEFT KNEE: ICD-10-CM

## 2022-12-09 DIAGNOSIS — M25.562 LEFT KNEE PAIN, UNSPECIFIED CHRONICITY: ICD-10-CM

## 2022-12-09 DIAGNOSIS — S83.412A SPRAIN OF MEDIAL COLLATERAL LIGAMENT OF LEFT KNEE, INITIAL ENCOUNTER: Primary | ICD-10-CM

## 2022-12-09 DIAGNOSIS — M25.562 ACUTE PAIN OF LEFT KNEE: ICD-10-CM

## 2022-12-09 PROCEDURE — 3008F PR BODY MASS INDEX (BMI) DOCUMENTED: ICD-10-PCS | Mod: S$GLB,,, | Performed by: ORTHOPAEDIC SURGERY

## 2022-12-09 PROCEDURE — 4010F PR ACE/ARB THEARPY RXD/TAKEN: ICD-10-PCS | Mod: S$GLB,,, | Performed by: ORTHOPAEDIC SURGERY

## 2022-12-09 PROCEDURE — 73562 X-RAY EXAM OF KNEE 3: CPT | Mod: 26,LT,, | Performed by: RADIOLOGY

## 2022-12-09 PROCEDURE — 99203 PR OFFICE/OUTPT VISIT, NEW, LEVL III, 30-44 MIN: ICD-10-PCS | Mod: S$GLB,,, | Performed by: ORTHOPAEDIC SURGERY

## 2022-12-09 PROCEDURE — 73562 XR KNEE 3 VIEW LEFT: ICD-10-PCS | Mod: 26,LT,, | Performed by: RADIOLOGY

## 2022-12-09 PROCEDURE — 1159F PR MEDICATION LIST DOCUMENTED IN MEDICAL RECORD: ICD-10-PCS | Mod: S$GLB,,, | Performed by: ORTHOPAEDIC SURGERY

## 2022-12-09 PROCEDURE — 73562 X-RAY EXAM OF KNEE 3: CPT | Mod: TC,PN,LT

## 2022-12-09 PROCEDURE — 3051F PR MOST RECENT HEMOGLOBIN A1C LEVEL 7.0 - < 8.0%: ICD-10-PCS | Mod: S$GLB,,, | Performed by: ORTHOPAEDIC SURGERY

## 2022-12-09 PROCEDURE — 3051F HG A1C>EQUAL 7.0%<8.0%: CPT | Mod: S$GLB,,, | Performed by: ORTHOPAEDIC SURGERY

## 2022-12-09 PROCEDURE — 99999 PR PBB SHADOW E&M-EST. PATIENT-LVL III: CPT | Mod: PBBFAC,,, | Performed by: ORTHOPAEDIC SURGERY

## 2022-12-09 PROCEDURE — 3066F NEPHROPATHY DOC TX: CPT | Mod: S$GLB,,, | Performed by: ORTHOPAEDIC SURGERY

## 2022-12-09 PROCEDURE — 3008F BODY MASS INDEX DOCD: CPT | Mod: S$GLB,,, | Performed by: ORTHOPAEDIC SURGERY

## 2022-12-09 PROCEDURE — 1159F MED LIST DOCD IN RCRD: CPT | Mod: S$GLB,,, | Performed by: ORTHOPAEDIC SURGERY

## 2022-12-09 PROCEDURE — 3066F PR DOCUMENTATION OF TREATMENT FOR NEPHROPATHY: ICD-10-PCS | Mod: S$GLB,,, | Performed by: ORTHOPAEDIC SURGERY

## 2022-12-09 PROCEDURE — 4010F ACE/ARB THERAPY RXD/TAKEN: CPT | Mod: S$GLB,,, | Performed by: ORTHOPAEDIC SURGERY

## 2022-12-09 PROCEDURE — 3061F NEG MICROALBUMINURIA REV: CPT | Mod: S$GLB,,, | Performed by: ORTHOPAEDIC SURGERY

## 2022-12-09 PROCEDURE — 3061F PR NEG MICROALBUMINURIA RESULT DOCUMENTED/REVIEW: ICD-10-PCS | Mod: S$GLB,,, | Performed by: ORTHOPAEDIC SURGERY

## 2022-12-09 PROCEDURE — 99203 OFFICE O/P NEW LOW 30 MIN: CPT | Mod: S$GLB,,, | Performed by: ORTHOPAEDIC SURGERY

## 2022-12-09 PROCEDURE — 99999 PR PBB SHADOW E&M-EST. PATIENT-LVL III: ICD-10-PCS | Mod: PBBFAC,,, | Performed by: ORTHOPAEDIC SURGERY

## 2022-12-09 RX ORDER — LISINOPRIL 10 MG/1
10 TABLET ORAL
COMMUNITY
Start: 2022-10-10

## 2022-12-09 NOTE — PROGRESS NOTES
Subjective:      Patient ID: Zunilda Alvarez is a 57 y.o. female.    Chief Complaint: Pain of the Left Knee    Referring Provider: Aaareferral Self  No address on file    HPI:   Ms Alvarez is a 57-year-old lady who presented today with complaints of approximately 6 months of left knee pain which was further exacerbated approximately 3 weeks ago after she was on a cruise and traversing a hill and slipped and fell twisting her knee while she was in [place].  Sitting for extended periods her walking increases her symptoms and nothing seems to improve them.  Occasionally when she flexes her knee she gets some pain.  She states she had an episode of swelling but it has resolved.  She gets intermittent giving way but denied locking.  She has taken NSAIDs with help.  She had an injection approximately 3 months ago which did not resolve her symptoms.  She has not done physical therapy nor worn a brace.  She can ambulate approximately 1 mi and climb 10-12 stairs.  She does not use an ambulatory assistive device.    Past Medical History:   Diagnosis Date    Diabetes mellitus type 2, insulin dependent     High cholesterol     Hypertension      *  *  * Sleep apnea  Seasonal allergies  GERD   Stomach ulcers      Past Surgical History:   Procedure Laterality Date    COLONOSCOPY N/A 9/10/2019   *     ESOPHAGOGASTRODUODENOSCOPY N/A 9/10/2019    Procedure: EGD (ESOPHAGOGASTRODUODENOSCOPY);  Surgeon: Carter Rice MD;  Location: [facility];  Service: Endoscopy;  Laterality: N/A;    HERNIA REPAIR UMBILICAL      HYSTERECTOMY         Review of patient's allergies indicates:  No Known Allergies    Social History     Occupational History       Tobacco Use    Smoking status: Never    Smokeless tobacco: Never   Substance and Sexual Activity    Alcohol use: Yes     Comment: rarely    Drug use: No    Sexual activity: Not on file      Family History   Problem Relation Age of Onset    Diabetes Mother      Hypertension Mother     Colon cancer Mother     Hypertension Father     Breast cancer Daughter     Ovarian cancer Neg Hx        Previous Hospitalizations:  Childbirth    ROS:   Review of Systems   Constitutional: Negative for chills and fever.   HENT:  Negative for congestion.    Eyes:  Negative for blurred vision and double vision.   Cardiovascular:  Negative for chest pain and cyanosis.   Respiratory:  Negative for cough and shortness of breath.    Endocrine: Negative for cold intolerance and polydipsia.   Hematologic/Lymphatic: Negative for adenopathy.   Skin:  Negative for flushing, itching and rash.   Musculoskeletal:  Positive for joint pain. Negative for gout.   Gastrointestinal:  Negative for constipation, diarrhea and heartburn.   Genitourinary:  Negative for nocturia.   Neurological:  Negative for headaches and seizures.   Psychiatric/Behavioral:  Negative for depression and substance abuse. The patient is not nervous/anxious.    Allergic/Immunologic: Positive for environmental allergies.         Objective:      Physical Exam:   General: AAOx3.  No acute distress  HEENT: Normocephalic, PEARLA EOMI, Good Dentition  Neck: Supple, No JVD  Chest: Symetric, equal excursion on inspiration  Abdomen: Soft NTND  Vascular:  Pulses intact and equal bilaterally.  Capillary refill less than 3 seconds and equal bilaterally  Neurologic:  Pinprick and soft touch intact and equal bilaterally  Integment:  No ecchymosis, no errythema  Extremity:  Knee: Extension/flexion equal bilaterally-2/135 degrees.  Negative drop-arm bilaterally.  Crepitus with motion both knees.  No effusion either knee.  Negative patellar load/compression bilaterally.  Negative patella apprehension/relocation bilaterally.  Increased excursion with valgus stressing left knee.  Tender at the medial collateral ligament with valgus stressing left knee.  Varus stressing equal bilaterally with endpoint.  Tender over the medial collateral ligament left  knee.  Mild swelling at the medial collateral ligament left knee.  No joint line tenderness either knee.  Sharron negative both knees.  Onemo negative both knees.  Lachman's/drawer equal bilaterally with good endpoint.  Mild tenderness over the anserine insertion left knee.  No swelling at either anserine insertion both knees.  Radiography:  Personally reviewed x-rays of the left knee completed on 12/09/2022 showed mild arthritic changes.      Assessment:       Impression:      1. MCL sprain, left knee.     2.   3.  Pes anserine bursitis, left knee.    Left knee pain.         Plan:       1.  Discussed physical examination and radiographic findings with the patient. Zunilda understands that she has an MCL sprain and a pes anserine bursitis of her left knee.  Treatment alternatives and outcomes were discussed with the patient she understands she could be treated conservatively with observation, activity modification, NSAIDs, bracing, physical therapy, injections, or she could consider surgical intervention such as arthroscopy.  Recommend she continue to trial conservative management for a little bit longer before considering surgery.  Discussed with the patient it can take many months for the pain of an MCL sprain to resolve.  2. Offered a steroid injection to the anserine insertion she declined for now.  3. Edelstein Ge global brace, left knee, prescription for the patient to obtain a brace was prepared.  4. Refer to physical therapy to start MCL rehab program.  5. Recommend she purchase over-the-counter Voltaren gel and applied to her knee twice daily and massage it in for 2 minutes.    6. Start doing home exercises such as quadriceps and hamstring strengthening.    7. Take NSAIDs as tolerated allowed by PCM.    8. Recommend she purchase over-the-counter arch supports and placed them in her shoes to help with the mechanical alignment of her knee.  9. Follow up in 6 weeks for re-evaluation.

## 2022-12-12 ENCOUNTER — HOSPITAL ENCOUNTER (OUTPATIENT)
Dept: RADIOLOGY | Facility: HOSPITAL | Age: 57
Discharge: HOME | End: 2022-12-12
Attending: NURSE PRACTITIONER
Payer: COMMERCIAL

## 2022-12-12 ENCOUNTER — DOCUMENTATION ONLY (OUTPATIENT)
Dept: ORTHOPEDICS | Facility: CLINIC | Age: 57
End: 2022-12-12
Payer: COMMERCIAL

## 2022-12-12 VITALS — BODY MASS INDEX: 39.52 KG/M2 | WEIGHT: 231.5 LBS | HEIGHT: 64 IN

## 2022-12-12 DIAGNOSIS — Z12.31 SCREENING MAMMOGRAM FOR BREAST CANCER: ICD-10-CM

## 2022-12-12 PROCEDURE — 77067 MAMMO DIGITAL SCREENING BILAT WITH TOMO: ICD-10-PCS | Mod: 26,,, | Performed by: RADIOLOGY

## 2022-12-12 PROCEDURE — 77067 SCR MAMMO BI INCL CAD: CPT | Mod: 26,,, | Performed by: RADIOLOGY

## 2022-12-12 PROCEDURE — 77063 BREAST TOMOSYNTHESIS BI: CPT | Mod: 26,,, | Performed by: RADIOLOGY

## 2022-12-12 PROCEDURE — 77063 BREAST TOMOSYNTHESIS BI: CPT | Mod: TC

## 2022-12-12 PROCEDURE — 77063 MAMMO DIGITAL SCREENING BILAT WITH TOMO: ICD-10-PCS | Mod: 26,,, | Performed by: RADIOLOGY

## 2022-12-13 ENCOUNTER — CLINICAL SUPPORT (OUTPATIENT)
Dept: REHABILITATION | Facility: HOSPITAL | Age: 57
End: 2022-12-13
Attending: ORTHOPAEDIC SURGERY
Payer: COMMERCIAL

## 2022-12-13 DIAGNOSIS — M25.562 ACUTE PAIN OF LEFT KNEE: ICD-10-CM

## 2022-12-13 DIAGNOSIS — S83.412A SPRAIN OF MEDIAL COLLATERAL LIGAMENT OF LEFT KNEE, INITIAL ENCOUNTER: ICD-10-CM

## 2022-12-13 PROCEDURE — 97110 THERAPEUTIC EXERCISES: CPT

## 2022-12-13 PROCEDURE — 97161 PT EVAL LOW COMPLEX 20 MIN: CPT

## 2022-12-13 NOTE — PLAN OF CARE
Name: Zunilda Mi Kim  Clinic Number: [id number]    Zunilda is a 57 y.o. female evaluated on 12/13/2022.     Diagnosis:   Encounter Diagnoses   Name Primary?    Sprain of medial collateral ligament of left knee, initial encounter     Acute pain of left knee      Physician: Ha Chawla DO  Treatment Orders: PT Eval and Treat    Past Medical History:   Diagnosis Date    Diabetes mellitus type 2, insulin dependent     High cholesterol     Hypertension     Sleep apnea      Current Outpatient Medications   Medication Sig    aspirin (ECOTRIN) 81 MG EC tablet Take 81 mg by mouth once daily.    blood-glucose meter kit TID testing of blood sugar  Type II DM with long term use of insulin    cimetidine (TAGAMET) 400 MG tablet Take 1 tablet (400 mg total) by mouth nightly.    colestipoL (COLESTID) 1 gram Tab Take 1 tablet (1 g total) by mouth once daily.    dapagliflozin (FARXIGA) 5 mg Tab tablet Take 1 tablet (5 mg total) by mouth once daily.    diclofenac sodium (VOLTAREN) 1 % Gel Apply 2 g topically 4 (four) times daily.    escitalopram oxalate (LEXAPRO) 10 MG tablet Take 20 mg by mouth once daily.    flash glucose sensor (FREESTYLE JOHNATHON 2 SENSOR) Kit 1 each by subcutaneous (via wearable injector) route every 14 (fourteen) days.    insulin glargine-yfgn 100 unit/mL (3 mL) InPn INJECT 70 UNITS SUBCUTANEOUSLY IN THE EVENING    lisinopriL 10 MG tablet Take 10 mg by mouth.    lisinopril-hydrochlorothiazide (PRINZIDE,ZESTORETIC) 10-12.5 mg per tablet Take 1 tablet by mouth once daily.    metFORMIN (GLUCOPHAGE) 1000 MG tablet TAKE 1 TABLET BY MOUTH TWICE DAILY WITH MEALS    metoprolol succinate (TOPROL-XL) 50 MG 24 hr tablet Take 50 mg by mouth 2 (two) times daily.     NOVOLOG FLEXPEN U-100 INSULIN 100 unit/mL (3 mL) InPn pen INJECT 18 UNITS SUBCUTANEOUSLY THREE TIMES DAILY    omeprazole (PRILOSEC) 40 MG capsule Take 40 mg by mouth once daily.    pravastatin (PRAVACHOL) 20 MG tablet Take 20 mg by mouth once daily.     semaglutide (OZEMPIC) 2 mg/dose (8 mg/3 mL) PnIj Inject 2 mg into the skin every 7 days.     No current facility-administered medications for this visit.     Review of patient's allergies indicates:  No Known Allergies  Precautions: NA   Occupation: clerical         Subjective  Onset: sudden   Ms Kim is a 57-year-old lady who presented today with complaints of approximately 6 months of left knee pain which was further exacerbated approximately 3 weeks ago after she was on a cruise and traversing a hill and slipped and fell twisting her knee while she was in [place].  Sitting for extended periods her walking increases her symptoms and nothing seems to improve them.  Occasionally when she flexes her knee she gets some pain.  She states she had an episode of swelling but it has resolved.  She gets intermittent giving way but denied locking.  She has taken NSAIDs with help.  She had an injection approximately 3 months ago which did not resolve her symptoms.  She has not done physical therapy nor worn a brace.  She can ambulate approximately 1 mi and climb 10-12 stairs.  She does not use an ambulatory assistive device.       Involved Area/Location: left  Current Symptoms: pain  Popping/clicking: denies  Lower extremity gives way: yes  Locking episodes: denies    Increases symptoms: walking  Decreases Symptoms: rest    Diagnostic Tests: Radiographs    Pain Scale: Zunilda rates pain to be 4 on a scale of 1-10.    Patient Goals: decrease pain and ambulate without pain      Objective  Observation:    Posture:   Left Ankle/Foot: WNL  Right Ankle/Foot: WNL    Left Knee: WNL  Right Knee: WNL    Left Hip/Pelvis: WNL  Right Hip/Pelvis: WNL    Range of Motion: Knee   Left Right   Flexion:     Extension       Strength: Knee   Left Right   Quadriceps 4/5 5/5   Hamstrings 4/5 5/5     Special Tests: Knee  All negative  Joint Mobility: WNL  Palpation: TTP anserine area   Sensation: intact to light touch    Edema:    Left:  absent  Right: absent    Gait: Kim ambulated community distance   with none.  Level of Assistance: independent  Patient displays no gait deviations observed.   Treatment:  Time In: 1100  Time Out: 1140    PT Evaluation Completed? Yes  Discussed Plan of Care with patient: Yes    Zunilda received 23 minutes of therapeutic exercises.  DKC with SB x 40  SLR x 40  Bridges on SB x 40  Clam shells x 40  Nustep x 40  Step ups x 40  Seated ham curls x 40 YTB     Written Home Exercises Provided:   Zunilda demo good understanding of the education provided. Patient demo good return demo of skill of exercises.      Assessment  This is a 57 y.o. female referred to outpatient physical therapy and presents with a medical diagnosis of   Encounter Diagnoses   Name Primary?    Sprain of medial collateral ligament of left knee, initial encounter     Acute pain of left knee     and demonstrates limitations as described in the problem list. Pt will benefit from physcial therapy services in order to maximize pain free and/or functional use of left knee. The following goals were discussed with the patient and patient is in agreement with them as to be addressed in the treatment plan.     Problem List: pain.    Short Term Goals:  3 weeks  1. Pt will demonstrate increased knee flexion AROM to WFL degrees.  2. Pt will demonstrate increased knee extension AROM to wfl  degrees.  3. Pt will demonstrate increased quad strength to 4+/5   4. Pt will demonstrate increased ham strength to 4+/5    Long Term Goals: 6 weeks  1.pain free with good strength L knee to be ind with work and community skills   2.  3.  4.  5. Pt will be independent with HEP and self management of symptoms.     Plan  Pt will be treated by physical therapy 2 times a week for 6 weeks for therapeutic exercises to achieve established goals. Zunilda may at times be seen by a PTA as part of the Rehab Team.       I certify the need for these services furnished under this plan of  treatment and while under my care.         ___________________________________  Physician/Referring Practitioner        _________________  Date of Signature

## 2022-12-15 ENCOUNTER — CLINICAL SUPPORT (OUTPATIENT)
Dept: REHABILITATION | Facility: HOSPITAL | Age: 57
End: 2022-12-15
Payer: COMMERCIAL

## 2022-12-15 DIAGNOSIS — S83.412A SPRAIN OF MEDIAL COLLATERAL LIGAMENT OF LEFT KNEE, INITIAL ENCOUNTER: Primary | ICD-10-CM

## 2022-12-15 PROCEDURE — 97110 THERAPEUTIC EXERCISES: CPT

## 2022-12-15 NOTE — PROGRESS NOTES
Physical Therapy Daily Note     Name: Zunilda Alvarez  Clinic Number: [id number]  Diagnosis:   Encounter Diagnosis   Name Primary?    Sprain of medial collateral ligament of left knee, initial encounter Yes     Physician: Ha Chawla, DO  Precautions: none  Visit #: 2 of 12  PTA Visit #: 0  Time In: 200  Time Out: 230    Subjective     Pt reports: she is sore   Pain Scale: Zunilda rates pain on a scale of 0-10 to be 5 currently.    Objective     Zunilda received individual therapeutic exercises to develop strength, endurance, ROM, and flexibility for 25 minutes including:  Nustep x 10  DKC x 40 with SB  Bridges with SB x 40  Toe taps x 40  Step ups x 40  Mini squats x 40  Toe raises x 40     Written Home Exercises Provided: reviewed with patients   Pt demo good understanding of the education provided. Zunilda demonstrated good return demonstration of activities.     Education provided re:  Zunilda verbalized good understanding of education provided.   No spiritual or educational barriers to learning provided    Assessment     Patient tolerated exercises well with no adverse side effects   This is a 57 y.o. female referred to outpatient physical therapy and presents with a medical diagnosis of L knee  and demonstrates limitations as described in the problem list. Pt prognosis is Good. Pt will continue to benefit from skilled outpatient physical therapy to address the deficits listed in the problem list, provide pt/family education and to maximize pt's level of independence in the home and community environment.     Goals as follows:  Short Term Goals:  3 weeks  1. Pt will demonstrate increased knee flexion AROM to WFL degrees.  2. Pt will demonstrate increased knee extension AROM to wfl  degrees.  3. Pt will demonstrate increased quad strength to 4+/5   4. Pt will demonstrate increased ham strength to 4+/5     Long Term Goals: 6 weeks  1.pain free with good  strength L knee to be ind with work and community skills      Plan     Continue with established Plan of Care towards PT goals.    Therapist: Estephania Mack, PT  12/15/2022

## 2022-12-30 ENCOUNTER — CLINICAL SUPPORT (OUTPATIENT)
Dept: REHABILITATION | Facility: HOSPITAL | Age: 57
End: 2022-12-30
Payer: COMMERCIAL

## 2022-12-30 DIAGNOSIS — S83.412A SPRAIN OF MEDIAL COLLATERAL LIGAMENT OF LEFT KNEE, INITIAL ENCOUNTER: Primary | ICD-10-CM

## 2022-12-30 PROCEDURE — 97110 THERAPEUTIC EXERCISES: CPT

## 2022-12-30 NOTE — PROGRESS NOTES
Physical Therapy Daily Note     Name: Zunilda Alvarez  Clinic Number: [id number]  Diagnosis:   Encounter Diagnosis   Name Primary?    Sprain of medial collateral ligament of left knee, initial encounter Yes     Physician: Ha Chawla, DO  Precautions: none  Visit #: 3of 12  PTA Visit #: 0  Time In: 240  Time Out: 310    Subjective     Pt reports: having come clicking and popping in her knee  Pain Scale: Zunilda rates pain on a scale of 0-10 to be 5 currently.    Objective     Zunilda received individual therapeutic exercises to develop strength, endurance, ROM, and flexibility for 30minutes including:  Nustep x 10  DKC x 40 with SB  Bridges with SB x 40  Toe taps x 40  Step ups x 40  Mini squats x 40  Toe raises x 40   Leg press 70# x 30  TKE x 40  Seated ham curls x 40 YTB    Written Home Exercises Provided: reviewed with patients   Pt demo good understanding of the education provided. Zunilda demonstrated good return demonstration of activities.     Education provided re:  Zunilda verbalized good understanding of education provided.   No spiritual or educational barriers to learning provided    Assessment     Patient tolerated exercises well with no adverse side effects   This is a 57 y.o. female referred to outpatient physical therapy and presents with a medical diagnosis of L knee  and demonstrates limitations as described in the problem list. Pt prognosis is Good. Pt will continue to benefit from skilled outpatient physical therapy to address the deficits listed in the problem list, provide pt/family education and to maximize pt's level of independence in the home and community environment.     Goals as follows:  Short Term Goals:  3 weeks  1. Pt will demonstrate increased knee flexion AROM to WFL degrees.  2. Pt will demonstrate increased knee extension AROM to wfl  degrees.  3. Pt will demonstrate increased quad strength to 4+/5   4. Pt will demonstrate  increased ham strength to 4+/5     Long Term Goals: 6 weeks  1.pain free with good strength L knee to be ind with work and community skills      Plan     Continue with established Plan of Care towards PT goals.    Therapist: Estephania Mack, PT  12/30/2022

## 2023-01-04 ENCOUNTER — CLINICAL SUPPORT (OUTPATIENT)
Dept: REHABILITATION | Facility: HOSPITAL | Age: 58
End: 2023-01-04
Attending: ORTHOPAEDIC SURGERY
Payer: COMMERCIAL

## 2023-01-04 DIAGNOSIS — S83.412A SPRAIN OF MEDIAL COLLATERAL LIGAMENT OF LEFT KNEE, INITIAL ENCOUNTER: Primary | ICD-10-CM

## 2023-01-04 PROCEDURE — 97110 THERAPEUTIC EXERCISES: CPT

## 2023-01-04 NOTE — PROGRESS NOTES
Physical Therapy Daily Note     Name: Zunilda Alvarez  Clinic Number: [id number]  Diagnosis:   Encounter Diagnosis   Name Primary?    Sprain of medial collateral ligament of left knee, initial encounter Yes     Physician: Ha Chawla, DO  Precautions: none  Visit #: 3of 12  PTA Visit #: 0  Time In: 200  Time Out: 240    Subjective     Pt reports: having come clicking and popping in her knee  Pain Scale: Zunilda rates pain on a scale of 0-10 to be 5 currently.    Objective     Zunilda received individual therapeutic exercises to develop strength, endurance, ROM, and flexibility for 30minutes including:  Nustep x 10  DKC x 40 with SB  Bridges with SB x 40  Toe taps x 40  Step ups x 40  Mini squats x 40  Toe raises x 40   Leg press 70# x 30  TKE x 40  Seated ham curls x 40 YTB    Written Home Exercises Provided: reviewed with patients   Pt demo good understanding of the education provided. Zunilda demonstrated good return demonstration of activities.     Education provided re:  Zunilda verbalized good understanding of education provided.   No spiritual or educational barriers to learning provided    Assessment     Patient tolerated exercises well with no adverse side effects   This is a 57 y.o. female referred to outpatient physical therapy and presents with a medical diagnosis of L knee  and demonstrates limitations as described in the problem list. Pt prognosis is Good. Pt will continue to benefit from skilled outpatient physical therapy to address the deficits listed in the problem list, provide pt/family education and to maximize pt's level of independence in the home and community environment.     Goals as follows:  Short Term Goals:  3 weeks  1. Pt will demonstrate increased knee flexion AROM to WFL degrees.  2. Pt will demonstrate increased knee extension AROM to wfl  degrees.  3. Pt will demonstrate increased quad strength to 4+/5   4. Pt will demonstrate  increased ham strength to 4+/5     Long Term Goals: 6 weeks  1.pain free with good strength L knee to be ind with work and community skills      Plan     Continue with established Plan of Care towards PT goals.    Therapist: Estephania Mack, PT  1/4/2023

## 2023-01-09 ENCOUNTER — CLINICAL SUPPORT (OUTPATIENT)
Dept: REHABILITATION | Facility: HOSPITAL | Age: 58
End: 2023-01-09
Payer: COMMERCIAL

## 2023-01-09 DIAGNOSIS — S83.412A SPRAIN OF MEDIAL COLLATERAL LIGAMENT OF LEFT KNEE, INITIAL ENCOUNTER: Primary | ICD-10-CM

## 2023-01-09 PROCEDURE — 97110 THERAPEUTIC EXERCISES: CPT

## 2023-01-09 NOTE — PROGRESS NOTES
Physical Therapy Daily Note     Name: Zunilda Alvarez  Clinic Number: [id number]  Diagnosis:   Encounter Diagnosis   Name Primary?    Sprain of medial collateral ligament of left knee, initial encounter Yes     Physician: Ha Chawla, DO  Precautions: none  Visit #: 3of 12  PTA Visit #: 0  Time In: 200  Time Out: 240    Subjective     Pt reports: having come clicking and popping in her knee  Pain Scale: Zunilda rates pain on a scale of 0-10 to be 5 currently.    Objective     Zunilda received individual therapeutic exercises to develop strength, endurance, ROM, and flexibility for 38 minutes including:  Nustep x 10  Rec bike x 10  DKC x 40 with SB  Bridges with SB x 40  Toe taps x 40  Step ups x 40  Mini squats x 40  Toe raises x 40   Leg press 70# x 30  TKE x 40  Seated ham curls x 40 YTB    Written Home Exercises Provided: reviewed with patients   Pt demo good understanding of the education provided. Zunilda demonstrated good return demonstration of activities.     Education provided re:  Zunilda verbalized good understanding of education provided.   No spiritual or educational barriers to learning provided    Assessment     Patient tolerated exercises well with no adverse side effects   This is a 57 y.o. female referred to outpatient physical therapy and presents with a medical diagnosis of L knee  and demonstrates limitations as described in the problem list. Pt prognosis is Good. Pt will continue to benefit from skilled outpatient physical therapy to address the deficits listed in the problem list, provide pt/family education and to maximize pt's level of independence in the home and community environment.     Goals as follows:  Short Term Goals:  3 weeks  1. Pt will demonstrate increased knee flexion AROM to WFL degrees.  2. Pt will demonstrate increased knee extension AROM to wfl  degrees.  3. Pt will demonstrate increased quad strength to 4+/5   4. Pt  will demonstrate increased ham strength to 4+/5     Long Term Goals: 6 weeks  1.pain free with good strength L knee to be ind with work and community skills      Plan     Continue with established Plan of Care towards PT goals.    Therapist: Estephania Mack, PT  1/9/2023

## 2023-01-18 ENCOUNTER — CLINICAL SUPPORT (OUTPATIENT)
Dept: REHABILITATION | Facility: HOSPITAL | Age: 58
End: 2023-01-18
Payer: COMMERCIAL

## 2023-01-18 PROCEDURE — 97110 THERAPEUTIC EXERCISES: CPT

## 2023-01-18 NOTE — PROGRESS NOTES
Physical Therapy Daily Note     Name: Zunilda Alvarez  Clinic Number: [id number]  Diagnosis:   No diagnosis found.    Physician: Ha Chawla, DO  Precautions: none  Visit #: 6 of 12  PTA Visit #: 0  Time In: 200  Time Out: 240    Subjective     Pt reports: having come clicking and popping in her knee. States she is about the same   Reports no significant progress   Pain Scale: Zunilda rates pain on a scale of 0-10 to be 5 currently.    Objective     Zunilda received individual therapeutic exercises to develop strength, endurance, ROM, and flexibility for 38 minutes including:  Nustep x 10  Rec bike x 10  DKC x 40 with SB  Bridges with SB x 40  Toe taps x 40  Step ups x 40 NP   Mini squats x 40  Toe raises x 40   Leg press 70# x 30  TKE x 40   NP  Seated ham curls x 40 YTB    Written Home Exercises Provided: reviewed with patients   Pt demo good understanding of the education provided. Zunilda demonstrated good return demonstration of activities.     Education provided re:  Zunilda verbalized good understanding of education provided.   No spiritual or educational barriers to learning provided    Assessment     Patient tolerated exercises well with no adverse side effects   This is a 57 y.o. female referred to outpatient physical therapy and presents with a medical diagnosis of L knee  and demonstrates limitations as described in the problem list. Pt prognosis is Good. Pt will continue to benefit from skilled outpatient physical therapy to address the deficits listed in the problem list, provide pt/family education and to maximize pt's level of independence in the home and community environment.   No significant progress made to this point  She is to return next week for follow up visit and possibly discuss surgery     Goals as follows:  Short Term Goals:  3 weeks  1. Pt will demonstrate increased knee flexion AROM to WFL degrees. MET  2. Pt will demonstrate  increased knee extension AROM to wfl  degrees. MET   3. Pt will demonstrate increased quad strength to 4+/5   4. Pt will demonstrate increased ham strength to 4+/5     Long Term Goals: 6 weeks  1.pain free with good strength L knee to be ind with work and community skills      Plan     Continue with established Plan of Care towards PT goals.    Therapist: Estephania Mack, PT  1/18/2023

## 2023-01-23 ENCOUNTER — CLINICAL SUPPORT (OUTPATIENT)
Dept: REHABILITATION | Facility: HOSPITAL | Age: 58
End: 2023-01-23
Payer: COMMERCIAL

## 2023-01-23 DIAGNOSIS — S83.412A SPRAIN OF MEDIAL COLLATERAL LIGAMENT OF LEFT KNEE, INITIAL ENCOUNTER: Primary | ICD-10-CM

## 2023-01-23 PROCEDURE — 97110 THERAPEUTIC EXERCISES: CPT

## 2023-01-23 NOTE — PROGRESS NOTES
Physical Therapy Daily Note     Name: Zunilda Alvarez  Clinic Number: [id number]  Diagnosis:   Encounter Diagnosis   Name Primary?    Sprain of medial collateral ligament of left knee, initial encounter Yes       Physician: Ha Chawla, DO  Precautions: none  Visit #: 6 of 12  PTA Visit #: 0  Time In: 155  Time Out: 235    Subjective     Pt reports: having come clicking and popping in her knee. Thinks she is getting slightly worse   Having extreme difficulty climbing stairs   Reports no significant progress   Pain Scale: Zunilda rates pain on a scale of 0-10 to be 4  currently.    Objective     Zunilda received individual therapeutic exercises to develop strength, endurance, ROM, and flexibility for 38 minutes including:  Nustep x 10  Rec bike x 10  DKC x 40 with SB  Bridges with SB x 40  Toe taps x 40  Step ups x 40 NP   Mini squats x 40  Toe raises x 40   Leg press 70# x 30  TKE x 40   NP  Seated ham curls x 40 way hip   4     Written Home Exercises Provided: reviewed with patients   Pt demo good understanding of the education provided. Zunilda demonstrated good return demonstration of activities.     Education provided re:  Zunilda verbalized good understanding of education provided.   No spiritual or educational barriers to learning provided    Assessment     Patient tolerated exercises well with no adverse side effects   This is a 57 y.o. female referred to outpatient physical therapy and presents with a medical diagnosis of L knee  and demonstrates limitations as described in the problem list. Pt prognosis is Good. Pt will continue to benefit from skilled outpatient physical therapy to address the deficits listed in the problem list, provide pt/family education and to maximize pt's level of independence in the home and community environment.   No significant progress made to this point  Sees ortho tomorrow for follow up   Worsening mobility skills over  the past week   Goals as follows:  Short Term Goals:  3 weeks  1. Pt will demonstrate increased knee flexion AROM to WFL degrees. MET  2. Pt will demonstrate increased knee extension AROM to wfl  degrees. MET   3. Pt will demonstrate increased quad strength to 4+/5   4. Pt will demonstrate increased ham strength to 4+/5     Long Term Goals: 6 weeks  1.pain free with good strength L knee to be ind with work and community skills      Plan     Continue with established Plan of Care towards PT goals.    Therapist: Estephania Mack, PT  1/23/2023

## 2023-01-24 ENCOUNTER — OFFICE VISIT (OUTPATIENT)
Dept: ORTHOPEDICS | Facility: CLINIC | Age: 58
End: 2023-01-24
Payer: COMMERCIAL

## 2023-01-24 VITALS — HEIGHT: 64 IN | BODY MASS INDEX: 39.94 KG/M2 | WEIGHT: 233.94 LBS | RESPIRATION RATE: 16 BRPM

## 2023-01-24 DIAGNOSIS — S83.242D ACUTE MEDIAL MENISCUS TEAR, LEFT, SUBSEQUENT ENCOUNTER: Primary | ICD-10-CM

## 2023-01-24 DIAGNOSIS — S83.412A SPRAIN OF MEDIAL COLLATERAL LIGAMENT OF LEFT KNEE, INITIAL ENCOUNTER: ICD-10-CM

## 2023-01-24 DIAGNOSIS — M25.562 ACUTE PAIN OF LEFT KNEE: ICD-10-CM

## 2023-01-24 DIAGNOSIS — M94.262 CHONDROMALACIA, LEFT KNEE: ICD-10-CM

## 2023-01-24 PROCEDURE — 99999 PR PBB SHADOW E&M-EST. PATIENT-LVL III: ICD-10-PCS | Mod: PBBFAC,,, | Performed by: ORTHOPAEDIC SURGERY

## 2023-01-24 PROCEDURE — 1159F PR MEDICATION LIST DOCUMENTED IN MEDICAL RECORD: ICD-10-PCS | Mod: S$GLB,,, | Performed by: ORTHOPAEDIC SURGERY

## 2023-01-24 PROCEDURE — 3008F BODY MASS INDEX DOCD: CPT | Mod: S$GLB,,, | Performed by: ORTHOPAEDIC SURGERY

## 2023-01-24 PROCEDURE — 1159F MED LIST DOCD IN RCRD: CPT | Mod: S$GLB,,, | Performed by: ORTHOPAEDIC SURGERY

## 2023-01-24 PROCEDURE — 4010F ACE/ARB THERAPY RXD/TAKEN: CPT | Mod: S$GLB,,, | Performed by: ORTHOPAEDIC SURGERY

## 2023-01-24 PROCEDURE — 3008F PR BODY MASS INDEX (BMI) DOCUMENTED: ICD-10-PCS | Mod: S$GLB,,, | Performed by: ORTHOPAEDIC SURGERY

## 2023-01-24 PROCEDURE — 99999 PR PBB SHADOW E&M-EST. PATIENT-LVL III: CPT | Mod: PBBFAC,,, | Performed by: ORTHOPAEDIC SURGERY

## 2023-01-24 PROCEDURE — 99213 PR OFFICE/OUTPT VISIT, EST, LEVL III, 20-29 MIN: ICD-10-PCS | Mod: S$GLB,,, | Performed by: ORTHOPAEDIC SURGERY

## 2023-01-24 PROCEDURE — 4010F PR ACE/ARB THEARPY RXD/TAKEN: ICD-10-PCS | Mod: S$GLB,,, | Performed by: ORTHOPAEDIC SURGERY

## 2023-01-24 PROCEDURE — 99213 OFFICE O/P EST LOW 20 MIN: CPT | Mod: S$GLB,,, | Performed by: ORTHOPAEDIC SURGERY

## 2023-01-24 NOTE — PROGRESS NOTES
Subjective:      Patient ID: Zunilda Alvarez is a 57 y.o. female.    Chief Complaint: Pain and Follow-up of the Left Knee      HPI:  Ms. Alvarez returned today with complaints of persistent pain her left knee.  At her last visit on 12/09/2022 she was forwarded to physical therapy.  She states she has completed physical therapy is still having significant pain in her knee.  She has also been wearing a brace without help.  She has been taken NSAIDs without help.  She was last seen on 12/09/2022 for 6 months of left knee pain which worsened after she was on a cruise and traversing a hill and slipped and fell twisting her knee while she was in [place].  Sitting for extended periods her walking increases her symptoms and nothing seems to improve them.  Occasionally when she flexes her knee she gets some pain.  She states she had an episode of swelling but it has resolved.  She gets intermittent giving way but denied locking.  She has taken NSAIDs with help.  She had an injection which did not resolve her symptoms.    She can ambulate approximately 1 mile and climb 10-12 stairs.  She does not use an ambulatory assistive device.     ROS:  No new diagnosis/surgery/prescriptions since last office visit on 12/09/2022  Constitutional: Negative for chills and fever.   HENT:  Negative for congestion.    Eyes:  Negative for blurred vision and double vision.   Cardiovascular:  Negative for chest pain and cyanosis.   Respiratory:  Negative for cough and shortness of breath.    Endocrine: Negative for cold intolerance and polydipsia.   Hematologic/Lymphatic: Negative for adenopathy.   Skin:  Negative for flushing, itching and rash.   Musculoskeletal:  Positive for joint pain. Negative for gout.   Gastrointestinal:  Negative for constipation, diarrhea and heartburn.   Genitourinary:  Negative for nocturia.   Neurological:  Negative for headaches and seizures.   Psychiatric/Behavioral:  Negative for depression and substance  abuse. The patient is not nervous/anxious.    Allergic/Immunologic: Positive for environmental allergies.       Objective:      Physical Exam:   General: AAOx3.  No acute distress  Vascular:  Pulses intact and equal bilaterally.  Capillary refill less than 3 seconds and equal bilaterally  Neurologic:  Pinprick and soft touch intact and equal bilaterally  Integment:  No ecchymosis, no errythema  Extremity: Knee: Extension/flexion equal bilaterally-2/135 degrees.  Mild positive drop-home left knee.  Crepitus with motion both knees.  Mild effusion left knee.  Negative patellar load/compression bilaterally.  Negative patella apprehension/relocation bilaterally.  Increased excursion with valgus stressing left knee.  Tender at the medial collateral ligament with valgus stressing left knee.  Varus stressing equal bilaterally with endpoint.  Tender over the medial collateral ligament left knee.  Positive joint line tenderness left knee.  Sharron negative both knees.  Molly positive left knee.  Lachman's/drawer equal bilaterally with good endpoint.  Mild tenderness over the anserine insertion left knee.  No swelling at either anserine insertion both knees.  Radiography:  No new x-rays done today.      Assessment:       Impression:     1. Acute medial meniscus tear, left, subsequent encounter    2. Chondromalacia, left knee    3. Sprain of medial collateral ligament of left knee, initial encounter    4. Acute pain of left knee          Plan:       1.  Discussed physical examination with the patient. Zunilda understands that she has a persistent MCL sprain and appears to have a medial meniscus tear.  She can continue with conservative measures such as observation, activity modification, NSAIDs, bracing, physical therapy, injections, or she could consider surgical intervention such as arthroscopy.  Before any surgery can be recommended an MRI is warranted.    2. Refer for an MRI of the left knee.  3. Final recommendations  after MRI is completed.  4. Continue taken NSAIDs as tolerated allowed by PCM.  5. Continue with knee brace as prescribed.  6. Continue with home exercises as previously shown discussed.  7. Discontinue physical therapy for now.  8. Follow up after MRI

## 2023-01-31 ENCOUNTER — HOSPITAL ENCOUNTER (OUTPATIENT)
Dept: RADIOLOGY | Facility: HOSPITAL | Age: 58
Discharge: HOME | End: 2023-01-31
Attending: ORTHOPAEDIC SURGERY
Payer: COMMERCIAL

## 2023-01-31 DIAGNOSIS — S83.412A SPRAIN OF MEDIAL COLLATERAL LIGAMENT OF LEFT KNEE, INITIAL ENCOUNTER: ICD-10-CM

## 2023-01-31 PROCEDURE — 73721 MRI JNT OF LWR EXTRE W/O DYE: CPT | Mod: 26,LT,, | Performed by: RADIOLOGY

## 2023-01-31 PROCEDURE — 73721 MRI JNT OF LWR EXTRE W/O DYE: CPT | Mod: TC,LT

## 2023-01-31 PROCEDURE — 73721 MRI KNEE WITHOUT CONTRAST LEFT: ICD-10-PCS | Mod: 26,LT,, | Performed by: RADIOLOGY

## 2023-02-03 ENCOUNTER — OFFICE VISIT (OUTPATIENT)
Dept: ORTHOPEDICS | Facility: CLINIC | Age: 58
End: 2023-02-03
Payer: COMMERCIAL

## 2023-02-03 VITALS — WEIGHT: 233.94 LBS | BODY MASS INDEX: 39.94 KG/M2 | HEIGHT: 64 IN | RESPIRATION RATE: 16 BRPM

## 2023-02-03 DIAGNOSIS — Z01.818 PRE-OP TESTING: ICD-10-CM

## 2023-02-03 DIAGNOSIS — S83.282D ACUTE LATERAL MENISCUS TEAR OF LEFT KNEE, SUBSEQUENT ENCOUNTER: ICD-10-CM

## 2023-02-03 DIAGNOSIS — S83.412A SPRAIN OF MEDIAL COLLATERAL LIGAMENT OF LEFT KNEE, INITIAL ENCOUNTER: ICD-10-CM

## 2023-02-03 DIAGNOSIS — S83.242D ACUTE MEDIAL MENISCUS TEAR, LEFT, SUBSEQUENT ENCOUNTER: Primary | ICD-10-CM

## 2023-02-03 DIAGNOSIS — M25.562 ACUTE PAIN OF LEFT KNEE: ICD-10-CM

## 2023-02-03 DIAGNOSIS — M94.262 CHONDROMALACIA, LEFT KNEE: ICD-10-CM

## 2023-02-03 PROCEDURE — 4010F ACE/ARB THERAPY RXD/TAKEN: CPT | Mod: S$GLB,,, | Performed by: ORTHOPAEDIC SURGERY

## 2023-02-03 PROCEDURE — 3008F BODY MASS INDEX DOCD: CPT | Mod: S$GLB,,, | Performed by: ORTHOPAEDIC SURGERY

## 2023-02-03 PROCEDURE — 3008F PR BODY MASS INDEX (BMI) DOCUMENTED: ICD-10-PCS | Mod: S$GLB,,, | Performed by: ORTHOPAEDIC SURGERY

## 2023-02-03 PROCEDURE — 1159F MED LIST DOCD IN RCRD: CPT | Mod: S$GLB,,, | Performed by: ORTHOPAEDIC SURGERY

## 2023-02-03 PROCEDURE — 4010F PR ACE/ARB THEARPY RXD/TAKEN: ICD-10-PCS | Mod: S$GLB,,, | Performed by: ORTHOPAEDIC SURGERY

## 2023-02-03 PROCEDURE — 99999 PR PBB SHADOW E&M-EST. PATIENT-LVL III: CPT | Mod: PBBFAC,,, | Performed by: ORTHOPAEDIC SURGERY

## 2023-02-03 PROCEDURE — 99999 PR PBB SHADOW E&M-EST. PATIENT-LVL III: ICD-10-PCS | Mod: PBBFAC,,, | Performed by: ORTHOPAEDIC SURGERY

## 2023-02-03 PROCEDURE — 99214 OFFICE O/P EST MOD 30 MIN: CPT | Mod: S$GLB,,, | Performed by: ORTHOPAEDIC SURGERY

## 2023-02-03 PROCEDURE — 1159F PR MEDICATION LIST DOCUMENTED IN MEDICAL RECORD: ICD-10-PCS | Mod: S$GLB,,, | Performed by: ORTHOPAEDIC SURGERY

## 2023-02-03 PROCEDURE — 99214 PR OFFICE/OUTPT VISIT, EST, LEVL IV, 30-39 MIN: ICD-10-PCS | Mod: S$GLB,,, | Performed by: ORTHOPAEDIC SURGERY

## 2023-02-03 NOTE — H&P (VIEW-ONLY)
Chief Complaint: Pain of the Left Knee     HPI:   Ms Alvarez is a 57-year-old lady who returned today for re-evaluation of her left knee.  She has had 8 months of left knee pain which was exacerbated in late 2022 after she was on a cruise and traversing a hill; she slipped and fell twisting her knee while she was in [place].  Sitting for extended periods her walking increases her symptoms and nothing seems to improve them.  Occasionally when she flexes her knee she gets pain.  She had an episode of swelling but it has resolved.  She gets intermittent giving way but denied locking.  She has taken NSAIDs with help.  She had an injection which did not resolve her symptoms.  She has done physical therapy without resolution of her pain.  She has a brace which does help.  She can ambulate approximately 1 mile and climb 10-12 stairs.  She does not use an ambulatory assistive device.          Past Medical History:   Diagnosis Date    Diabetes mellitus type 2, insulin dependent      High cholesterol      Hypertension       *  *  * Sleep apnea  Seasonal allergies  GERD   Stomach ulcers              Past Surgical History:   Procedure Laterality Date    COLONOSCOPY N/A 9/10/2019   *     ESOPHAGOGASTRODUODENOSCOPY N/A 9/10/2019     Procedure: EGD (ESOPHAGOGASTRODUODENOSCOPY);  Surgeon: Carter Rice MD;  Location: [facility];  Service: Endoscopy;  Laterality: N/A;    HERNIA REPAIR UMBILICAL        HYSTERECTOMY            Review of patient's allergies indicates:  No Known Allergies     Social History            Occupational History       Tobacco Use    Smoking status: Never    Smokeless tobacco: Never   Substance and Sexual Activity    Alcohol use: Yes       Comment: rarely    Drug use: No    Sexual activity: Not on file            Family History   Problem Relation Age of Onset    Diabetes Mother      Hypertension Mother      Colon cancer Mother      Hypertension Father      Breast  cancer Daughter      Ovarian cancer Neg Hx           Previous Hospitalizations:  Childbirth     ROS:  No new diagnosis/surgery/prescriptions since last office visit on 01/24/2023  Constitutional: Negative for chills and fever.   HENT:  Negative for congestion.    Eyes:  Negative for blurred vision and double vision.   Cardiovascular:  Negative for chest pain and cyanosis.   Respiratory:  Negative for cough and shortness of breath.    Endocrine: Negative for cold intolerance and polydipsia.   Hematologic/Lymphatic: Negative for adenopathy.   Skin:  Negative for flushing, itching and rash.   Musculoskeletal:  Positive for joint pain. Negative for gout.   Gastrointestinal:  Negative for constipation, diarrhea and heartburn.   Genitourinary:  Negative for nocturia.   Neurological:  Negative for headaches and seizures.   Psychiatric/Behavioral:  Negative for depression and substance abuse. The patient is not nervous/anxious.    Allergic/Immunologic: Positive for environmental allergies.          Objective:   Physical Exam:   General: AAOx3.  No acute distress  HEENT: Normocephalic, PEARLA EOMI, Good Dentition  Neck: Supple, No JVD  Chest: Symetric, equal excursion on inspiration  Abdomen: Soft NTND  Vascular:  Pulses intact and equal bilaterally.  Capillary refill less than 3 seconds and equal bilaterally  Neurologic:  Pinprick and soft touch intact and equal bilaterally  Integment:  No ecchymosis, no errythema  Extremity:  Knee: Extension/flexion equal bilaterally-2/135 degrees.  Negative drop-arm bilaterally.  Crepitus with motion both knees.  No effusion either knee.  Negative patellar load/compression bilaterally.  Negative patella apprehension/relocation bilaterally.  Increased excursion with valgus stressing left knee.  Tender at the medial collateral ligament with valgus stressing left knee.  Varus stressing equal bilaterally with endpoint.  Tender over the medial collateral ligament left knee.  Mild swelling at  the medial collateral ligament left knee.  No joint line tenderness either knee.  Sharron negative both knees.  Molly negative both knees.  Lachman's/drawer equal bilaterally with good endpoint.  Nontender over the anserine insertion left knee.  No swelling at either anserine insertion both knees.  Radiography:  Personally reviewed MRI of the left knee completed on 01/31/2023 which showed a lateral and medial meniscus tear with some mild chondromalacia.  There is also an MCL sprain.  Previous  x-rays of the left knee completed on 12/09/2022 showed mild arthritic changes.      Assessment:       Impression:       1. Medial meniscus tear, left knee   2.   3.   4.   5. Lateral meniscus tear, left knee.  MCL sprain, left knee.  Chondromalacia, left knee.    Left knee pain          Plan:       1.  Discussed physical examination and radiographic findings with the patient. Zunilda understands that she has a medial/lateral meniscus tear and chondromalacia of her left knee.  Treatment alternatives and outcomes were discussed with the patient she understands she could continue to be treated conservatively with observation, activity modification, NSAIDs, bracing, physical therapy, injections, or she could consider surgical intervention such as arthroscopy.  She feels she has failed conservative management and would like to schedule surgery.  2. Possible complications of surgery to include bleeding, infection, scarring, nerve/blood vessel/tendon damage, need for further surgery, failed surgery, failure to improve, possible persistent pain, possible arthritis, possible arthrofibrosis, and possible recurrence were discussed with the patient.  She was permitted to ask questions and all concerns were addressed to her satisfaction.  3. Consent for arthroscopy left knee.  4. Tentatively schedule surgery for 03/02/2023.    5. All postoperative meds will be prescribed on the date of surgery.    6. Continue with brace wear this was  discussed with the patient.    7. Continue with NSAIDs as tolerated and allowed by PCM.  8. Continue to use Voltaren gel apply per box instructions and massage in for 2 minutes twice daily   9. Continue do home exercises such as quadriceps and hamstring strengthening.  10. Follow up 10-12 days postop

## 2023-02-03 NOTE — H&P
Chief Complaint: Pain of the Left Knee     HPI:   Ms Alvarez is a 57-year-old lady who returned today for re-evaluation of her left knee.  She has had 8 months of left knee pain which was exacerbated in late 2022 after she was on a cruise and traversing a hill; she slipped and fell twisting her knee while she was in [place].  Sitting for extended periods her walking increases her symptoms and nothing seems to improve them.  Occasionally when she flexes her knee she gets pain.  She had an episode of swelling but it has resolved.  She gets intermittent giving way but denied locking.  She has taken NSAIDs with help.  She had an injection which did not resolve her symptoms.  She has done physical therapy without resolution of her pain.  She has a brace which does help.  She can ambulate approximately 1 mile and climb 10-12 stairs.  She does not use an ambulatory assistive device.          Past Medical History:   Diagnosis Date    Diabetes mellitus type 2, insulin dependent      High cholesterol      Hypertension       *  *  * Sleep apnea  Seasonal allergies  GERD   Stomach ulcers              Past Surgical History:   Procedure Laterality Date    COLONOSCOPY N/A 9/10/2019   *     ESOPHAGOGASTRODUODENOSCOPY N/A 9/10/2019     Procedure: EGD (ESOPHAGOGASTRODUODENOSCOPY);  Surgeon: Carter Rice MD;  Location: [facility];  Service: Endoscopy;  Laterality: N/A;    HERNIA REPAIR UMBILICAL        HYSTERECTOMY            Review of patient's allergies indicates:  No Known Allergies     Social History            Occupational History       Tobacco Use    Smoking status: Never    Smokeless tobacco: Never   Substance and Sexual Activity    Alcohol use: Yes       Comment: rarely    Drug use: No    Sexual activity: Not on file            Family History   Problem Relation Age of Onset    Diabetes Mother      Hypertension Mother      Colon cancer Mother      Hypertension Father      Breast  cancer Daughter      Ovarian cancer Neg Hx           Previous Hospitalizations:  Childbirth     ROS:  No new diagnosis/surgery/prescriptions since last office visit on 01/24/2023  Constitutional: Negative for chills and fever.   HENT:  Negative for congestion.    Eyes:  Negative for blurred vision and double vision.   Cardiovascular:  Negative for chest pain and cyanosis.   Respiratory:  Negative for cough and shortness of breath.    Endocrine: Negative for cold intolerance and polydipsia.   Hematologic/Lymphatic: Negative for adenopathy.   Skin:  Negative for flushing, itching and rash.   Musculoskeletal:  Positive for joint pain. Negative for gout.   Gastrointestinal:  Negative for constipation, diarrhea and heartburn.   Genitourinary:  Negative for nocturia.   Neurological:  Negative for headaches and seizures.   Psychiatric/Behavioral:  Negative for depression and substance abuse. The patient is not nervous/anxious.    Allergic/Immunologic: Positive for environmental allergies.          Objective:   Physical Exam:   General: AAOx3.  No acute distress  HEENT: Normocephalic, PEARLA EOMI, Good Dentition  Neck: Supple, No JVD  Chest: Symetric, equal excursion on inspiration  Abdomen: Soft NTND  Vascular:  Pulses intact and equal bilaterally.  Capillary refill less than 3 seconds and equal bilaterally  Neurologic:  Pinprick and soft touch intact and equal bilaterally  Integment:  No ecchymosis, no errythema  Extremity:  Knee: Extension/flexion equal bilaterally-2/135 degrees.  Negative drop-arm bilaterally.  Crepitus with motion both knees.  No effusion either knee.  Negative patellar load/compression bilaterally.  Negative patella apprehension/relocation bilaterally.  Increased excursion with valgus stressing left knee.  Tender at the medial collateral ligament with valgus stressing left knee.  Varus stressing equal bilaterally with endpoint.  Tender over the medial collateral ligament left knee.  Mild swelling at  the medial collateral ligament left knee.  No joint line tenderness either knee.  Sharron negative both knees.  [place] negative both knees.  Lachman's/drawer equal bilaterally with good endpoint.  Nontender over the anserine insertion left knee.  No swelling at either anserine insertion both knees.  Radiography:  Personally reviewed MRI of the left knee completed on 01/31/2023 which showed a lateral and medial meniscus tear with some mild chondromalacia.  There is also an MCL sprain.  Previous  x-rays of the left knee completed on 12/09/2022 showed mild arthritic changes.      Assessment:       Impression:       1. Medial meniscus tear, left knee   2.   3.   4.   5. Lateral meniscus tear, left knee.  MCL sprain, left knee.  Chondromalacia, left knee.    Left knee pain          Plan:       1.  Discussed physical examination and radiographic findings with the patient. Zunilda understands that she has a medial/lateral meniscus tear and chondromalacia of her left knee.  Treatment alternatives and outcomes were discussed with the patient she understands she could continue to be treated conservatively with observation, activity modification, NSAIDs, bracing, physical therapy, injections, or she could consider surgical intervention such as arthroscopy.  She feels she has failed conservative management and would like to schedule surgery.  2. Possible complications of surgery to include bleeding, infection, scarring, nerve/blood vessel/tendon damage, need for further surgery, failed surgery, failure to improve, possible persistent pain, possible arthritis, possible arthrofibrosis, and possible recurrence were discussed with the patient.  She was permitted to ask questions and all concerns were addressed to her satisfaction.  3. Consent for arthroscopy left knee.  4. Tentatively schedule surgery for 03/02/2023.    5. All postoperative meds will be prescribed on the date of surgery.    6. Continue with brace wear this was  discussed with the patient.    7. Continue with NSAIDs as tolerated and allowed by PCM.  8. Continue to use Voltaren gel apply per box instructions and massage in for 2 minutes twice daily   9. Continue do home exercises such as quadriceps and hamstring strengthening.  10. Follow up 10-12 days postop

## 2023-02-03 NOTE — PROGRESS NOTES
Patient ID: Zunilda Alvarez is a 57 y.o. female.     Chief Complaint: Pain of the Left Knee     Referring Provider: Aaarefabhi Self  No address on file     HPI:   Ms Alvarez is a 57-year-old lady who returned today for re-evaluation of her left knee.  She has had 8 months of left knee pain which was exacerbated in late 2022 after she was on a cruise and traversing a hill; she slipped and fell twisting her knee while she was in [place].  Sitting for extended periods her walking increases her symptoms and nothing seems to improve them.  Occasionally when she flexes her knee she gets pain.  She had an episode of swelling but it has resolved.  She gets intermittent giving way but denied locking.  She has taken NSAIDs with help.  She had an injection which did not resolve her symptoms.  She has done physical therapy without resolution of her pain.  She has a brace which does help.  She can ambulate approximately 1 mile and climb 10-12 stairs.  She does not use an ambulatory assistive device.          Past Medical History:   Diagnosis Date    Diabetes mellitus type 2, insulin dependent      High cholesterol      Hypertension       *  *  * Sleep apnea  Seasonal allergies  GERD   Stomach ulcers              Past Surgical History:   Procedure Laterality Date    COLONOSCOPY N/A 9/10/2019   *     ESOPHAGOGASTRODUODENOSCOPY N/A 9/10/2019     Procedure: EGD (ESOPHAGOGASTRODUODENOSCOPY);  Surgeon: Carter Rice MD;  Location: [facility];  Service: Endoscopy;  Laterality: N/A;    HERNIA REPAIR UMBILICAL        HYSTERECTOMY            Review of patient's allergies indicates:  No Known Allergies     Social History            Occupational History       Tobacco Use    Smoking status: Never    Smokeless tobacco: Never   Substance and Sexual Activity    Alcohol use: Yes       Comment: rarely    Drug use: No    Sexual activity: Not on file            Family History   Problem Relation  Age of Onset    Diabetes Mother      Hypertension Mother      Colon cancer Mother      Hypertension Father      Breast cancer Daughter      Ovarian cancer Neg Hx           Previous Hospitalizations:  Childbirth     ROS:  No new diagnosis/surgery/prescriptions since last office visit on 01/24/2023  Constitutional: Negative for chills and fever.   HENT:  Negative for congestion.    Eyes:  Negative for blurred vision and double vision.   Cardiovascular:  Negative for chest pain and cyanosis.   Respiratory:  Negative for cough and shortness of breath.    Endocrine: Negative for cold intolerance and polydipsia.   Hematologic/Lymphatic: Negative for adenopathy.   Skin:  Negative for flushing, itching and rash.   Musculoskeletal:  Positive for joint pain. Negative for gout.   Gastrointestinal:  Negative for constipation, diarrhea and heartburn.   Genitourinary:  Negative for nocturia.   Neurological:  Negative for headaches and seizures.   Psychiatric/Behavioral:  Negative for depression and substance abuse. The patient is not nervous/anxious.    Allergic/Immunologic: Positive for environmental allergies.          Objective:   Physical Exam:   General: AAOx3.  No acute distress  HEENT: Normocephalic, PEARLA EOMI, Good Dentition  Neck: Supple, No JVD  Chest: Symetric, equal excursion on inspiration  Abdomen: Soft NTND  Vascular:  Pulses intact and equal bilaterally.  Capillary refill less than 3 seconds and equal bilaterally  Neurologic:  Pinprick and soft touch intact and equal bilaterally  Integment:  No ecchymosis, no errythema  Extremity:  Knee: Extension/flexion equal bilaterally-2/135 degrees.  Negative drop-arm bilaterally.  Crepitus with motion both knees.  No effusion either knee.  Negative patellar load/compression bilaterally.  Negative patella apprehension/relocation bilaterally.  Increased excursion with valgus stressing left knee.  Tender at the medial collateral ligament with valgus stressing left knee.   Varus stressing equal bilaterally with endpoint.  Tender over the medial collateral ligament left knee.  Mild swelling at the medial collateral ligament left knee.  No joint line tenderness either knee.  Sharron negative both knees.  [place] negative both knees.  Lachman's/drawer equal bilaterally with good endpoint.  Nontender over the anserine insertion left knee.  No swelling at either anserine insertion both knees.  Radiography:  Personally reviewed MRI of the left knee completed on 01/31/2023 which showed a lateral and medial meniscus tear with some mild chondromalacia.  There is also an MCL sprain.  Previous  x-rays of the left knee completed on 12/09/2022 showed mild arthritic changes.      Assessment:       Impression:       1. Medial meniscus tear, left knee   2.   3.   4.   5. Lateral meniscus tear, left knee.  MCL sprain, left knee.  Chondromalacia, left knee.    Left knee pain          Plan:       1.  Discussed physical examination and radiographic findings with the patient. Zunilda understands that she has a medial/lateral meniscus tear and chondromalacia of her left knee.  Treatment alternatives and outcomes were discussed with the patient she understands she could continue to be treated conservatively with observation, activity modification, NSAIDs, bracing, physical therapy, injections, or she could consider surgical intervention such as arthroscopy.  She feels she has failed conservative management and would like to schedule surgery.  2. Possible complications of surgery to include bleeding, infection, scarring, nerve/blood vessel/tendon damage, need for further surgery, failed surgery, failure to improve, possible persistent pain, possible arthritis, possible arthrofibrosis, and possible recurrence were discussed with the patient.  She was permitted to ask questions and all concerns were addressed to her satisfaction.  3. Consent for arthroscopy left knee.  4. Tentatively schedule surgery for  03/02/2023.    5. All postoperative meds will be prescribed on the date of surgery.    6. Continue with brace wear this was discussed with the patient.    7. Continue with NSAIDs as tolerated and allowed by PCM.  8. Continue to use Voltaren gel apply per box instructions and massage in for 2 minutes twice daily   9. Continue do home exercises such as quadriceps and hamstring strengthening.  10. Follow up 10-12 days postop

## 2023-02-13 ENCOUNTER — ANESTHESIA EVENT (OUTPATIENT)
Dept: SURGERY | Facility: HOSPITAL | Age: 58
End: 2023-02-13
Payer: COMMERCIAL

## 2023-02-13 ENCOUNTER — HOSPITAL ENCOUNTER (OUTPATIENT)
Dept: PREADMISSION TESTING | Facility: HOSPITAL | Age: 58
Discharge: HOME | End: 2023-02-13
Attending: ORTHOPAEDIC SURGERY
Payer: COMMERCIAL

## 2023-02-13 NOTE — ANESTHESIA PREPROCEDURE EVALUATION
02/13/2023  Zunilda Alvarez is a 57 y.o., female.      Pre-op Assessment    I have reviewed the Patient Summary Reports.     I have reviewed the Nursing Notes. I have reviewed the NPO Status.   I have reviewed the Medications.     Review of Systems  Social:  Non-Smoker    Hematology/Oncology:  Hematology Normal   Oncology Normal     EENT/Dental:EENT/Dental Normal   Cardiovascular:   Hypertension    Pulmonary:   Sleep Apnea    Hepatic/GI:   GERD    Musculoskeletal:  Musculoskeletal Normal    Endocrine:   Diabetes    Dermatological:  Skin Normal        Physical Exam    Airway:  Mallampati: IV / III  Mouth Opening: Normal  TM Distance: 4 - 6 cm  Tongue: Large  Neck ROM: Normal ROM    Chest/Lungs:  Clear to auscultation    Heart:  Rate: Normal  Rhythm: Regular Rhythm        Anesthesia Plan  Type of Anesthesia, risks & benefits discussed:    Anesthesia Type: Gen Supraglottic Airway, Gen ETT  Intra-op Monitoring Plan: Standard ASA Monitors  Post Op Pain Control Plan: multimodal analgesia  Induction:  IV  Airway Plan: Direct and Video  Informed Consent: Informed consent signed with the Patient and all parties understand the risks and agree with anesthesia plan.  All questions answered.   ASA Score: 3  Day of Surgery Review of History & Physical: H&P Update referred to the surgeon/provider.    Ready For Surgery From Anesthesia Perspective.     .

## 2023-02-24 ENCOUNTER — DOCUMENTATION ONLY (OUTPATIENT)
Dept: ORTHOPEDICS | Facility: CLINIC | Age: 58
End: 2023-02-24
Payer: COMMERCIAL

## 2023-03-02 ENCOUNTER — ANESTHESIA (OUTPATIENT)
Dept: SURGERY | Facility: HOSPITAL | Age: 58
End: 2023-03-02
Payer: COMMERCIAL

## 2023-03-02 ENCOUNTER — HOSPITAL ENCOUNTER (OUTPATIENT)
Facility: HOSPITAL | Age: 58
Discharge: HOME | End: 2023-03-02
Attending: ORTHOPAEDIC SURGERY | Admitting: ORTHOPAEDIC SURGERY
Payer: COMMERCIAL

## 2023-03-02 VITALS
HEIGHT: 64 IN | SYSTOLIC BLOOD PRESSURE: 151 MMHG | OXYGEN SATURATION: 94 % | TEMPERATURE: 98 F | RESPIRATION RATE: 11 BRPM | WEIGHT: 231 LBS | DIASTOLIC BLOOD PRESSURE: 78 MMHG | BODY MASS INDEX: 39.44 KG/M2 | HEART RATE: 78 BPM

## 2023-03-02 DIAGNOSIS — M94.262 CHONDROMALACIA, LEFT KNEE: ICD-10-CM

## 2023-03-02 DIAGNOSIS — S83.242D ACUTE MEDIAL MENISCUS TEAR, LEFT, SUBSEQUENT ENCOUNTER: Primary | ICD-10-CM

## 2023-03-02 PROBLEM — S83.282A ACUTE LATERAL MENISCUS TEAR OF LEFT KNEE: Status: RESOLVED | Noted: 2023-03-02 | Resolved: 2023-03-02

## 2023-03-02 PROBLEM — S83.282A ACUTE LATERAL MENISCUS TEAR OF LEFT KNEE: Status: ACTIVE | Noted: 2023-03-02

## 2023-03-02 LAB
POCT GLUCOSE: 176 MG/DL (ref 70–110)
POCT GLUCOSE: 208 MG/DL (ref 70–110)

## 2023-03-02 PROCEDURE — 25000003 PHARM REV CODE 250: Performed by: NURSE ANESTHETIST, CERTIFIED REGISTERED

## 2023-03-02 PROCEDURE — 36000710: Performed by: ORTHOPAEDIC SURGERY

## 2023-03-02 PROCEDURE — 25000003 PHARM REV CODE 250: Performed by: ORTHOPAEDIC SURGERY

## 2023-03-02 PROCEDURE — 71000033 HC RECOVERY, INTIAL HOUR: Performed by: ORTHOPAEDIC SURGERY

## 2023-03-02 PROCEDURE — 63600175 PHARM REV CODE 636 W HCPCS: Performed by: ANESTHESIOLOGY

## 2023-03-02 PROCEDURE — D9220A PRA ANESTHESIA: ICD-10-PCS | Mod: CRNA,,, | Performed by: NURSE ANESTHETIST, CERTIFIED REGISTERED

## 2023-03-02 PROCEDURE — 63600175 PHARM REV CODE 636 W HCPCS: Performed by: ORTHOPAEDIC SURGERY

## 2023-03-02 PROCEDURE — 27201423 OPTIME MED/SURG SUP & DEVICES STERILE SUPPLY: Performed by: ORTHOPAEDIC SURGERY

## 2023-03-02 PROCEDURE — 29881 PR KNEE SCOPE SINGLE MENISECECTOMY: ICD-10-PCS | Mod: LT,,, | Performed by: ORTHOPAEDIC SURGERY

## 2023-03-02 PROCEDURE — 63600175 PHARM REV CODE 636 W HCPCS: Performed by: NURSE ANESTHETIST, CERTIFIED REGISTERED

## 2023-03-02 PROCEDURE — D9220A PRA ANESTHESIA: Mod: ANES,,, | Performed by: ANESTHESIOLOGY

## 2023-03-02 PROCEDURE — D9220A PRA ANESTHESIA: Mod: CRNA,,, | Performed by: NURSE ANESTHETIST, CERTIFIED REGISTERED

## 2023-03-02 PROCEDURE — 37000009 HC ANESTHESIA EA ADD 15 MINS: Performed by: ORTHOPAEDIC SURGERY

## 2023-03-02 PROCEDURE — 71000015 HC POSTOP RECOV 1ST HR: Performed by: ORTHOPAEDIC SURGERY

## 2023-03-02 PROCEDURE — 37000008 HC ANESTHESIA 1ST 15 MINUTES: Performed by: ORTHOPAEDIC SURGERY

## 2023-03-02 PROCEDURE — 36000711: Performed by: ORTHOPAEDIC SURGERY

## 2023-03-02 PROCEDURE — 29881 ARTHRS KNE SRG MNISECTMY M/L: CPT | Mod: LT,,, | Performed by: ORTHOPAEDIC SURGERY

## 2023-03-02 PROCEDURE — D9220A PRA ANESTHESIA: ICD-10-PCS | Mod: ANES,,, | Performed by: ANESTHESIOLOGY

## 2023-03-02 RX ORDER — EPINEPHRINE 1 MG/ML
INJECTION INTRAMUSCULAR; INTRAVENOUS; SUBCUTANEOUS
Status: DISCONTINUED | OUTPATIENT
Start: 2023-03-02 | End: 2023-03-02 | Stop reason: HOSPADM

## 2023-03-02 RX ORDER — EPHEDRINE SULFATE 50 MG/ML
INJECTION, SOLUTION INTRAVENOUS
Status: DISCONTINUED | OUTPATIENT
Start: 2023-03-02 | End: 2023-03-02

## 2023-03-02 RX ORDER — PROPOFOL 10 MG/ML
VIAL (ML) INTRAVENOUS
Status: DISCONTINUED | OUTPATIENT
Start: 2023-03-02 | End: 2023-03-02

## 2023-03-02 RX ORDER — ONDANSETRON 2 MG/ML
INJECTION INTRAMUSCULAR; INTRAVENOUS
Status: DISCONTINUED | OUTPATIENT
Start: 2023-03-02 | End: 2023-03-02

## 2023-03-02 RX ORDER — FENTANYL CITRATE 50 UG/ML
INJECTION, SOLUTION INTRAMUSCULAR; INTRAVENOUS
Status: DISCONTINUED | OUTPATIENT
Start: 2023-03-02 | End: 2023-03-02

## 2023-03-02 RX ORDER — LIDOCAINE HYDROCHLORIDE 20 MG/ML
INJECTION, SOLUTION EPIDURAL; INFILTRATION; INTRACAUDAL; PERINEURAL
Status: DISCONTINUED | OUTPATIENT
Start: 2023-03-02 | End: 2023-03-02

## 2023-03-02 RX ORDER — DIPHENHYDRAMINE HYDROCHLORIDE 50 MG/ML
12.5 INJECTION INTRAMUSCULAR; INTRAVENOUS
Status: DISCONTINUED | OUTPATIENT
Start: 2023-03-02 | End: 2023-03-02 | Stop reason: HOSPADM

## 2023-03-02 RX ORDER — ACETAMINOPHEN 10 MG/ML
INJECTION, SOLUTION INTRAVENOUS
Status: DISCONTINUED | OUTPATIENT
Start: 2023-03-02 | End: 2023-03-02

## 2023-03-02 RX ORDER — CEFAZOLIN SODIUM 2 G/50ML
SOLUTION INTRAVENOUS
Status: DISCONTINUED
Start: 2023-03-02 | End: 2023-03-02 | Stop reason: HOSPADM

## 2023-03-02 RX ORDER — ONDANSETRON 2 MG/ML
4 INJECTION INTRAMUSCULAR; INTRAVENOUS DAILY PRN
Status: DISCONTINUED | OUTPATIENT
Start: 2023-03-02 | End: 2023-03-02 | Stop reason: HOSPADM

## 2023-03-02 RX ORDER — MORPHINE SULFATE 4 MG/ML
2 INJECTION, SOLUTION INTRAMUSCULAR; INTRAVENOUS EVERY 5 MIN PRN
Status: DISCONTINUED | OUTPATIENT
Start: 2023-03-02 | End: 2023-03-02 | Stop reason: HOSPADM

## 2023-03-02 RX ORDER — LIDOCAINE HYDROCHLORIDE 10 MG/ML
1 INJECTION, SOLUTION EPIDURAL; INFILTRATION; INTRACAUDAL; PERINEURAL ONCE
Status: DISCONTINUED | OUTPATIENT
Start: 2023-03-02 | End: 2023-03-02 | Stop reason: HOSPADM

## 2023-03-02 RX ORDER — BUPIVACAINE HYDROCHLORIDE AND EPINEPHRINE 2.5; 5 MG/ML; UG/ML
INJECTION, SOLUTION EPIDURAL; INFILTRATION; INTRACAUDAL; PERINEURAL
Status: DISCONTINUED | OUTPATIENT
Start: 2023-03-02 | End: 2023-03-02 | Stop reason: HOSPADM

## 2023-03-02 RX ORDER — DEXAMETHASONE SODIUM PHOSPHATE 4 MG/ML
INJECTION, SOLUTION INTRA-ARTICULAR; INTRALESIONAL; INTRAMUSCULAR; INTRAVENOUS; SOFT TISSUE
Status: DISCONTINUED | OUTPATIENT
Start: 2023-03-02 | End: 2023-03-02

## 2023-03-02 RX ORDER — SODIUM CHLORIDE, SODIUM LACTATE, POTASSIUM CHLORIDE, CALCIUM CHLORIDE 600; 310; 30; 20 MG/100ML; MG/100ML; MG/100ML; MG/100ML
INJECTION, SOLUTION INTRAVENOUS CONTINUOUS
Status: DISCONTINUED | OUTPATIENT
Start: 2023-03-02 | End: 2023-03-02 | Stop reason: HOSPADM

## 2023-03-02 RX ORDER — SODIUM CHLORIDE, SODIUM LACTATE, POTASSIUM CHLORIDE, CALCIUM CHLORIDE 600; 310; 30; 20 MG/100ML; MG/100ML; MG/100ML; MG/100ML
125 INJECTION, SOLUTION INTRAVENOUS CONTINUOUS
Status: DISCONTINUED | OUTPATIENT
Start: 2023-03-02 | End: 2023-03-02 | Stop reason: HOSPADM

## 2023-03-02 RX ORDER — CEFAZOLIN SODIUM 1 G/3ML
INJECTION, POWDER, FOR SOLUTION INTRAMUSCULAR; INTRAVENOUS
Status: DISCONTINUED | OUTPATIENT
Start: 2023-03-02 | End: 2023-03-02

## 2023-03-02 RX ORDER — LIDOCAINE HCL/EPINEPHRINE/PF 2%-1:200K
VIAL (ML) INJECTION
Status: DISCONTINUED | OUTPATIENT
Start: 2023-03-02 | End: 2023-03-02 | Stop reason: HOSPADM

## 2023-03-02 RX ORDER — SODIUM CHLORIDE, SODIUM LACTATE, POTASSIUM CHLORIDE, CALCIUM CHLORIDE 600; 310; 30; 20 MG/100ML; MG/100ML; MG/100ML; MG/100ML
INJECTION, SOLUTION INTRAVENOUS CONTINUOUS PRN
Status: DISCONTINUED | OUTPATIENT
Start: 2023-03-02 | End: 2023-03-02

## 2023-03-02 RX ORDER — MIDAZOLAM HYDROCHLORIDE 1 MG/ML
INJECTION INTRAMUSCULAR; INTRAVENOUS
Status: DISCONTINUED | OUTPATIENT
Start: 2023-03-02 | End: 2023-03-02

## 2023-03-02 RX ADMIN — SODIUM CHLORIDE, SODIUM LACTATE, POTASSIUM CHLORIDE, AND CALCIUM CHLORIDE: .6; .31; .03; .02 INJECTION, SOLUTION INTRAVENOUS at 07:03

## 2023-03-02 RX ADMIN — MIDAZOLAM HYDROCHLORIDE 5 MG: 1 INJECTION, SOLUTION INTRAMUSCULAR; INTRAVENOUS at 07:03

## 2023-03-02 RX ADMIN — DEXAMETHASONE SODIUM PHOSPHATE 4 MG: 4 INJECTION, SOLUTION INTRAMUSCULAR; INTRAVENOUS at 07:03

## 2023-03-02 RX ADMIN — CEFAZOLIN 2 G: 330 INJECTION, POWDER, FOR SOLUTION INTRAMUSCULAR; INTRAVENOUS at 07:03

## 2023-03-02 RX ADMIN — ACETAMINOPHEN 1000 MG: 10 INJECTION, SOLUTION INTRAVENOUS at 07:03

## 2023-03-02 RX ADMIN — FENTANYL CITRATE 100 MCG: 50 INJECTION, SOLUTION INTRAMUSCULAR; INTRAVENOUS at 07:03

## 2023-03-02 RX ADMIN — LIDOCAINE HYDROCHLORIDE 40 MG: 20 INJECTION, SOLUTION EPIDURAL; INFILTRATION; INTRACAUDAL; PERINEURAL at 07:03

## 2023-03-02 RX ADMIN — PROPOFOL 200 MG: 10 INJECTION, EMULSION INTRAVENOUS at 07:03

## 2023-03-02 RX ADMIN — EPHEDRINE SULFATE 10 MG: 50 INJECTION INTRAVENOUS at 08:03

## 2023-03-02 RX ADMIN — EPHEDRINE SULFATE 25 MG: 50 INJECTION INTRAVENOUS at 07:03

## 2023-03-02 RX ADMIN — ONDANSETRON 4 MG: 2 INJECTION INTRAMUSCULAR; INTRAVENOUS at 07:03

## 2023-03-02 RX ADMIN — EPHEDRINE SULFATE 15 MG: 50 INJECTION INTRAVENOUS at 08:03

## 2023-03-02 RX ADMIN — SODIUM CHLORIDE, POTASSIUM CHLORIDE, SODIUM LACTATE AND CALCIUM CHLORIDE: 600; 310; 30; 20 INJECTION, SOLUTION INTRAVENOUS at 07:03

## 2023-03-02 NOTE — OP NOTE
[facility]  Orthopedic Surgery    3/2/2023    Zunilda Alvarez  [id number]      PREOPERATIVE DIAGNOSIS:   1. Acute medial meniscus tear, left, subsequent encounter [S83.242D]  2. Acute lateral meniscus tear of left knee, subsequent encounter [S83.282D]  3. Chondromalacia, left knee [M94.262]    POSTOPERATIVE DIAGNOSIS:    1. Small degenerative medial meniscus tear, left knee.    2. Grade 2 chondromalacia lateral tibial plateau, left knee.    3. Grade 4 chondromalacia patellofemoral joint, left knee.   4. ACL incompetence, left knee.    5. Plica, left knee.      PROCEDURE:  1. Arthroscopic partial medial meniscectomy, left knee.    2. Arthroscopic chondroplasty patellofemoral joint, left knee.  3. Arthroscopic chondroplasty lateral tibial plateau, left knee.    4. Arthroscopic plica excision, left knee.      SURGEON: Ha Chawla D.O.    ASSISTANT: Orton Grinnell, CFA    ANESTHESIA:  General    BLOOD LOSS:  Less than 5 cc.    TOURNIQUET:  Non applicable.    DRAINS:  None.    PATHOLOGY:  Shavings    COMPLICATION:  None    INDICATIONS FOR PROCEDURE:   Ms Alvarez is a 57-year-old lady who  has had 8 months of left knee pain which was exacerbated in late November 2022 after she was on a cruise and traversing a hill; she slipped and fell twisting her knee while she was in [place].  Sitting for extended periods or walking increases her symptoms and nothing seems to improve them.  Occasionally when she flexes her knee she gets pain.  She had an episode of swelling but it has resolved.  She gets intermittent giving way but denied locking.  She has taken NSAIDs with help.  She had an injection which did not resolve her symptoms.  She has done physical therapy without resolution of her pain.  She has a brace which does help.  She can ambulate approximately 1 mile and climb 10-12 stairs.  She does not use an ambulatory assistive device.   She elected to proceed with surgery after she failed conservative  management and complications to include bleeding, infection, scarring, nerve/blood vessel/tendon damage, need for further surgery, failed surgery, failure to improve, stiffness, arthritis, and possible recurrence were discussed.  She signed a consent.    PROCEDURE IN DETAIL:   The patient was brought to the operating room and was transferred to the operating bed where all bony prominences were well padded.  General anesthesia was then administered by the Anesthesiology Department.  After general anesthesia was administered a tourniquet was applied to the upper part of the patient's left lower extremity this was not inflated throughout the procedure.  The patient's left leg was then prepped with chlorhexidine solution and draped in the normal sterile fashion.  After prepping and draping bony and soft tissue landmarks were identified and incision sites were drawn the patient's knee.  These were then anesthetized with a 50/50 mixture of lidocaine and Marcaine.  The patient's knee was then insufflated with irrigant solution.       Sharp incision was then made at the inferolateral portal site with a #11 blade followed by introduction of blunt trocar and cannula.  The trocar was removed and the camera was placed with inflow and outflow.  The superior pouch was inspected there was a large plica the superior pouch.  The patella was inspected and there was grade 4 chondromalacia of the patella.  Patellofemoral tracking was inspected and the patella tracked with a slight tilt.  The femoral groove was inspected and there was grade 3 chondromalacia of the femoral groove.  The medial shoulder was inspected and no major abnormalities were noted.  The medial gutter was entered and no major abnormalities were noted.         The medial compartment was then entered and a medial portal site was established in normal fashion with localization with a 18 gauge spinal needle, sharp incision with a #11 blade and expansion with a blunt  trocar.  A probe was placed in the patient's knee and the meniscus was probed no excessive excursion was noted.  The meniscus was further inspected and there was a small degenerative tear of the posterior body of the medial meniscus.  This was debrided to stable meniscus with a full radius shaver.  The chondral surfaces were inspected and no major abnormalities were noted.          The intracondylar notch was then entered and the ACL and PCL were inspected.  The ACL was probed and there was a loose ACL.  The PCL was probed and stable with probing.         The lateral compartment was then entered and the lateral meniscus was probed no excessive excursion was noted.  The chondral surfaces were inspected and the patient had grade 2/3 chondromalacia of the lateral tibial plateau a chondroplasty was completed with a full-radius shaver.  The lateral gutter was then entered and no major abnormalities were noted.  The lateral shoulder was inspected and no major abnormalities were noted.         A superolateral portal site was then established in normal fashion with localization with a 18 gauge spinal needle, sharp incision with a #11 blade followed by expansion with a blunt trocar.  A chondroplasty was then completed on the patella and femoral groove.  A plica excision was then completed with a full-radius shaver.  The patient's knee was then copiously irrigated and then evacuated with suction.  The camera and cannula were removed from the patient's knee.       The incisions were then closed with nylon suture in a simple interrupted fashion.  Her incisions were then dressed with Adaptic, sterile gauze and an Ace wrap.  She was then awakened by anesthesia and transferred from the operating room to the recovery room in stable condition.  She tolerated the procedures well without complication.

## 2023-03-02 NOTE — PLAN OF CARE
Has met unit/department guidelines for discharge from each phase of the post procedure continuum. Leaving floor per w/c with RN and daughter. AAO x3. Resp even and unlabored room air. No distress noted. Tolerating Po fluids without c/o nausea/vomiting. Post-op dsg clean, dry and intact. Neuro checks WNL LLE. Ice pack sent home with pt. All personal belongings returned to pt.

## 2023-03-02 NOTE — PLAN OF CARE
Post-op  mg/dl.  Oral airway removed. No resp distress observed. Oral mucosa intact. O2 5 liters applied to nose. Tolerated well.

## 2023-03-02 NOTE — DISCHARGE INSTRUCTIONS

## 2023-03-02 NOTE — DISCHARGE SUMMARY
[facility] Surgery  Discharge Note  Short Stay    Procedure(s) (LRB):  ARTHROSCOPY, KNEE (Left)  ARTHROSCOPY, KNEE, WITH MENISCECTOMY (Left)  ARTHROSCOPY, KNEE, WITH PATELLOFEMORAL (Left)      OUTCOME: Patient tolerated treatment/procedure well without complication and is now ready for discharge.    DISPOSITION: Home or Self Care    FINAL DIAGNOSIS:    1. Small degenerative medial meniscus tear, left knee.    2. Grade 2 chondromalacia lateral tibial plateau, left knee.    3. Grade 4 chondromalacia patellofemoral joint, left knee.   4. ACL incompetence, left knee.    5. Plica, left knee.      FOLLOWUP: In clinic    DISCHARGE INSTRUCTIONS:    Discharge Procedure Orders   Diet Adult Regular     Keep surgical extremity elevated     Ice to affected area     Other restrictions (specify):   Order Comments: 1. Elevate and ice left knee.    2. May remove dressing in 3 days.  May shower after removal of dressing, do not soak in a tub.  Place Band-Aids over incisions after removal of dressing.  3. May return to work on Monday with seated work only.  4. Beginning this evening start taking one 325 mg aspirin once a day for DVT prophylaxis.     Notify your health care provider if you experience any of the following:  temperature >100.4      Remove dressing in 72 hours   Order Comments: May remove dressing in 3 days.  May shower after removal of dressing, do not soak in a tub.  Place Band-Aids over incisions after removal of dressing     Shower on day dressing removed (No bath)   Order Comments: May remove dressing in 3 days.  May shower after removal of dressing, do not soak in a tub.  Place Band-Aids over incisions after removal of dressing     Weight bearing restrictions (specify):   Order Comments: May walk with weight-bearing at tolerance to the left lower extremity.         Clinical Reference Documents Added to Patient Instructions         Document    HOW TO PREVENT SURGICAL SITE INFECTIONS (ENGLISH)    KNEE ARTHROSCOPY  DISCHARGE INSTRUCTIONS (ENGLISH)    POSTOPERATIVE PAIN DISCHARGE INSTRUCTIONS (ENGLISH)            TIME SPENT ON DISCHARGE: 20 minutes

## 2023-03-02 NOTE — ANESTHESIA POSTPROCEDURE EVALUATION
Anesthesia Post Evaluation    Patient: Zunilda Alvarez    Procedure(s) Performed: Procedure(s) (LRB):  ARTHROSCOPY, KNEE (Left)  ARTHROSCOPY, KNEE, WITH MENISCECTOMY (Left)  ARTHROSCOPY, KNEE, WITH PATELLOFEMORAL (Left)    Final Anesthesia Type: general      Patient location during evaluation: PACU  Patient participation: Yes- Able to Participate  Level of consciousness: awake and alert  Post-procedure vital signs: reviewed and stable  Pain management: adequate  Airway patency: patent    PONV status at discharge: No PONV  Anesthetic complications: no      Cardiovascular status: blood pressure returned to baseline  Respiratory status: unassisted  Hydration status: euvolemic  Follow-up not needed.          Vitals Value Taken Time   /78 03/02/23 0947   Temp 36.7 03/02/23 1312   Pulse 81 03/02/23 0947   Resp 19 03/02/23 0947   SpO2 89 % 03/02/23 0947   Vitals shown include unvalidated device data.      Event Time   Out of Recovery 09:15:00         Pain/Jazlyn Score: Jazlyn Score: 8 (3/2/2023  9:15 AM)  Modified Jazlyn Score: 18 (3/2/2023 10:00 AM)

## 2023-03-02 NOTE — TRANSFER OF CARE
"Anesthesia Transfer of Care Note    Patient: Zunilda Alvarez    Procedure(s) Performed: Procedure(s) (LRB):  ARTHROSCOPY, KNEE (Left)  ARTHROSCOPY, KNEE, WITH MENISCECTOMY (Left)  ARTHROSCOPY, KNEE, WITH PATELLOFEMORAL (Left)    Patient location: PACU    Anesthesia Type: general    Transport from OR: Transported from OR on room air with adequate spontaneous ventilation    Post pain: adequate analgesia    Post assessment: no apparent anesthetic complications    Post vital signs: stable    Level of consciousness: sedated and responds to stimulation    Nausea/Vomiting: no nausea/vomiting    Complications: none    Transfer of care protocol was followed      Last vitals:   Visit Vitals  /72 (BP Location: Right arm, Patient Position: Lying)   Pulse 80   Temp 36.8 °C (98.3 °F) (Oral)   Resp 16   Ht 5' 4" (1.626 m)   Wt 104.8 kg (231 lb)   SpO2 95%   Breastfeeding No   BMI 39.65 kg/m²     "

## 2023-03-02 NOTE — DISCHARGE SUMMARY
[facility], [place]  Discharge Note  Short Stay    Procedure(s) (LRB):  ARTHROSCOPY, KNEE (Left)  ARTHROSCOPY, KNEE, WITH MENISCECTOMY (Left)  ARTHROSCOPY, KNEE, WITH PATELLOFEMORAL (Left)      OUTCOME: Patient tolerated treatment/procedure well without complication and is now ready for discharge.    DISPOSITION: Home or Self Care    FINAL DIAGNOSIS:    1. Small degenerative medial meniscus tear, left knee.    2. Grade 4 chondromalacia patellofemoral joint, left knee.    3. Grade 2 chondromalacia lateral tibial plateau, left knee.  4. Plica, left knee.  5. ACL incompetence, left knee.    FOLLOWUP: In clinic    DISCHARGE INSTRUCTIONS:    Discharge Procedure Orders   Diet Adult Regular     Keep surgical extremity elevated     Ice to affected area     Other restrictions (specify):   Order Comments: 1. Elevate and ice left knee.    2. May remove dressing in 3 days.  May shower after removal of dressing, do not soak in a tub.  Place Band-Aids over incisions after removal of dressing.  3. May return to work on Monday with seated work only.  4. Beginning this evening start taking one 325 mg aspirin once a day for DVT prophylaxis.     Notify your health care provider if you experience any of the following:  temperature >100.4      Remove dressing in 72 hours   Order Comments: May remove dressing in 3 days.  May shower after removal of dressing, do not soak in a tub.  Place Band-Aids over incisions after removal of dressing     Shower on day dressing removed (No bath)   Order Comments: May remove dressing in 3 days.  May shower after removal of dressing, do not soak in a tub.  Place Band-Aids over incisions after removal of dressing     Weight bearing restrictions (specify):   Order Comments: May walk with weight-bearing at tolerance to the left lower extremity.         Clinical Reference Documents Added to Patient Instructions         Document    HOW TO PREVENT SURGICAL SITE INFECTIONS (ENGLISH)    KNEE ARTHROSCOPY DISCHARGE  INSTRUCTIONS (ENGLISH)    POSTOPERATIVE PAIN DISCHARGE INSTRUCTIONS (ENGLISH)            TIME SPENT ON DISCHARGE: 20 minutes

## 2023-03-14 ENCOUNTER — OFFICE VISIT (OUTPATIENT)
Dept: ORTHOPEDICS | Facility: CLINIC | Age: 58
End: 2023-03-14
Payer: COMMERCIAL

## 2023-03-14 VITALS — HEIGHT: 64 IN | BODY MASS INDEX: 39.45 KG/M2 | WEIGHT: 231.06 LBS | RESPIRATION RATE: 17 BRPM

## 2023-03-14 DIAGNOSIS — Z48.02 VISIT FOR SUTURE REMOVAL: Primary | ICD-10-CM

## 2023-03-14 DIAGNOSIS — S83.242D ACUTE MEDIAL MENISCUS TEAR, LEFT, SUBSEQUENT ENCOUNTER: ICD-10-CM

## 2023-03-14 DIAGNOSIS — Z98.890 STATUS POST ARTHROSCOPY OF LEFT KNEE: ICD-10-CM

## 2023-03-14 PROCEDURE — 1159F PR MEDICATION LIST DOCUMENTED IN MEDICAL RECORD: ICD-10-PCS | Mod: S$GLB,,, | Performed by: ORTHOPAEDIC SURGERY

## 2023-03-14 PROCEDURE — 4010F ACE/ARB THERAPY RXD/TAKEN: CPT | Mod: S$GLB,,, | Performed by: ORTHOPAEDIC SURGERY

## 2023-03-14 PROCEDURE — 3066F NEPHROPATHY DOC TX: CPT | Mod: S$GLB,,, | Performed by: ORTHOPAEDIC SURGERY

## 2023-03-14 PROCEDURE — 3066F PR DOCUMENTATION OF TREATMENT FOR NEPHROPATHY: ICD-10-PCS | Mod: S$GLB,,, | Performed by: ORTHOPAEDIC SURGERY

## 2023-03-14 PROCEDURE — 99999 PR PBB SHADOW E&M-EST. PATIENT-LVL III: CPT | Mod: PBBFAC,,, | Performed by: ORTHOPAEDIC SURGERY

## 2023-03-14 PROCEDURE — 4010F PR ACE/ARB THEARPY RXD/TAKEN: ICD-10-PCS | Mod: S$GLB,,, | Performed by: ORTHOPAEDIC SURGERY

## 2023-03-14 PROCEDURE — 3061F NEG MICROALBUMINURIA REV: CPT | Mod: S$GLB,,, | Performed by: ORTHOPAEDIC SURGERY

## 2023-03-14 PROCEDURE — 3008F PR BODY MASS INDEX (BMI) DOCUMENTED: ICD-10-PCS | Mod: S$GLB,,, | Performed by: ORTHOPAEDIC SURGERY

## 2023-03-14 PROCEDURE — 1159F MED LIST DOCD IN RCRD: CPT | Mod: S$GLB,,, | Performed by: ORTHOPAEDIC SURGERY

## 2023-03-14 PROCEDURE — 3061F PR NEG MICROALBUMINURIA RESULT DOCUMENTED/REVIEW: ICD-10-PCS | Mod: S$GLB,,, | Performed by: ORTHOPAEDIC SURGERY

## 2023-03-14 PROCEDURE — 3008F BODY MASS INDEX DOCD: CPT | Mod: S$GLB,,, | Performed by: ORTHOPAEDIC SURGERY

## 2023-03-14 PROCEDURE — 99024 POSTOP FOLLOW-UP VISIT: CPT | Mod: S$GLB,,, | Performed by: ORTHOPAEDIC SURGERY

## 2023-03-14 PROCEDURE — 99999 PR PBB SHADOW E&M-EST. PATIENT-LVL III: ICD-10-PCS | Mod: PBBFAC,,, | Performed by: ORTHOPAEDIC SURGERY

## 2023-03-14 PROCEDURE — 99024 PR POST-OP FOLLOW-UP VISIT: ICD-10-PCS | Mod: S$GLB,,, | Performed by: ORTHOPAEDIC SURGERY

## 2023-03-14 PROCEDURE — 3051F HG A1C>EQUAL 7.0%<8.0%: CPT | Mod: S$GLB,,, | Performed by: ORTHOPAEDIC SURGERY

## 2023-03-14 PROCEDURE — 3051F PR MOST RECENT HEMOGLOBIN A1C LEVEL 7.0 - < 8.0%: ICD-10-PCS | Mod: S$GLB,,, | Performed by: ORTHOPAEDIC SURGERY

## 2023-03-14 NOTE — PROGRESS NOTES
Subjective:      Patient ID: Zunilda Alvarez is a 58 y.o. female.    Chief Complaint: Post-op Evaluation and Suture / Staple Removal of the Left Knee      HPI: Ms. Alvarez for her 1st postop visit after an arthroscopy of her left knee.  She stated she is doing well still has some minor pain in her knee.  Her date of surgery 03/02/2023. She has had 9 months of left knee pain which was exacerbated in late November 2022 after she was on a cruise and traversing a hill; she slipped and fell twisting her knee while she was in [place].     ROS:  New diagnosis/surgery/prescriptions since last office visit on 02/03/2023: Arthroscopy left knee  Constitutional: Negative for chills and fever.   HENT:  Negative for congestion.    Eyes:  Negative for blurred vision and double vision.   Cardiovascular:  Negative for chest pain and cyanosis.   Respiratory:  Negative for cough and shortness of breath.    Endocrine: Negative for cold intolerance and polydipsia.   Hematologic/Lymphatic: Negative for adenopathy.   Skin:  Negative for flushing, itching and rash.   Musculoskeletal:  Positive for joint pain. Negative for gout.   Gastrointestinal:  Negative for constipation, diarrhea and heartburn.   Genitourinary:  Negative for nocturia.   Neurological:  Negative for headaches and seizures.   Psychiatric/Behavioral:  Negative for depression and substance abuse. The patient is not nervous/anxious.    Allergic/Immunologic: Positive for environmental allergies.       Objective:      Physical Exam:   General: AAOx3.  No acute distress  Vascular:  Pulses intact and equal bilaterally.  Capillary refill less than 3 seconds and equal bilaterally  Neurologic:  Pinprick and soft touch intact and equal bilaterally  Integment:  Incisions well approximated with sutures in place.  Extremity:  Knee:  Extension/flexion equal bilaterally.  Mild increased excursion with Lachman's/drawer left knee.  Sharron negative both knees.  Relatively no  swelling both knees.  Varus/valgus stressing with good endpoint bilaterally.  Radiography:  No new x-rays done today.      Assessment:       Impression:     1. Arthroscopy left knee with partial medial meniscectomy and plica excision with chondroplasty         Plan:       1.  Discussed physical examination and arthroscopic findings with the patient. Zunilda understands that she had an arthroscopy of her left knee with a partial medial meniscectomy chondroplasty and a plica excision.  She understands she appears to be doing well but may improve with a little bit of physical therapy.  Her arthroscopy pictures were discussed with her today.  2. Refer to physical therapy to start knee motion exercises and post arthroscopy rehab.  3. Any pain can be treated with over-the-counter medications dosed per box instructions.  4. Continue doing home exercises such as quadriceps and hamstring strengthening.    5. She understands when she has to be up and walking distances she can wear her brace which can help with some micro instability.  6. Follow up in 6 weeks for re-evaluation.

## 2023-03-17 ENCOUNTER — CLINICAL SUPPORT (OUTPATIENT)
Dept: REHABILITATION | Facility: HOSPITAL | Age: 58
End: 2023-03-17
Attending: ORTHOPAEDIC SURGERY
Payer: COMMERCIAL

## 2023-03-17 DIAGNOSIS — M94.262 CHONDROMALACIA, LEFT KNEE: Primary | ICD-10-CM

## 2023-03-17 DIAGNOSIS — Z98.890 STATUS POST ARTHROSCOPY OF LEFT KNEE: ICD-10-CM

## 2023-03-17 PROCEDURE — 97110 THERAPEUTIC EXERCISES: CPT

## 2023-03-17 PROCEDURE — 97161 PT EVAL LOW COMPLEX 20 MIN: CPT

## 2023-03-17 NOTE — PLAN OF CARE
Physical Therapy Initial Evaluation     Name: Zunilda Alvarez  Clinic Number: [id number]    Zunilda is a 58 y.o. female evaluated on 03/17/2023.     Diagnosis:   Encounter Diagnoses   Name Primary?    Status post arthroscopy of left knee     Chondromalacia, left knee Yes     Physician: Ha Chawla DO  Treatment Orders: PT Eval and Treat    Past Medical History:   Diagnosis Date    Diabetes mellitus type 2, insulin dependent     High cholesterol     Hypertension     Sleep apnea      Current Outpatient Medications   Medication Sig    aspirin (ECOTRIN) 81 MG EC tablet Take 81 mg by mouth once daily.    blood-glucose meter kit TID testing of blood sugar  Type II DM with long term use of insulin    cimetidine (TAGAMET) 400 MG tablet Take 1 tablet (400 mg total) by mouth nightly.    colestipoL (COLESTID) 1 gram Tab Take 1 tablet (1 g total) by mouth once daily.    dapagliflozin (FARXIGA) 10 mg tablet Take 1 tablet (10 mg total) by mouth once daily.    diclofenac sodium (VOLTAREN) 1 % Gel Apply 2 g topically 4 (four) times daily.    escitalopram oxalate (LEXAPRO) 10 MG tablet Take 20 mg by mouth once daily.    flash glucose sensor (FREESTYLE JOHNATHON 2 SENSOR) Kit 1 each by subcutaneous (via wearable injector) route every 14 (fourteen) days.    insulin aspart U-100 (NOVOLOG FLEXPEN U-100 INSULIN) 100 unit/mL (3 mL) InPn pen INJECT 18 UNITS SUBCUTANEOUSLY THREE TIMES DAILY    insulin glargine-yfgn 100 unit/mL (3 mL) InPn INJECT 70 UNITS SUBCUTANEOUSLY IN THE EVENING    lisinopriL 10 MG tablet Take 10 mg by mouth.    lisinopril-hydrochlorothiazide (PRINZIDE,ZESTORETIC) 10-12.5 mg per tablet Take 1 tablet by mouth once daily.    metFORMIN (GLUCOPHAGE) 1000 MG tablet TAKE 1 TABLET BY MOUTH TWICE DAILY WITH MEALS    metoprolol succinate (TOPROL-XL) 50 MG 24 hr tablet Take 50 mg by mouth 2 (two) times daily.     omeprazole (PRILOSEC) 40 MG capsule Take 40 mg by mouth once  daily.    pravastatin (PRAVACHOL) 20 MG tablet Take 20 mg by mouth once daily.    semaglutide (OZEMPIC) 2 mg/dose (8 mg/3 mL) PnIj Inject 2 mg into the skin every 7 days.     No current facility-administered medications for this visit.     Review of patient's allergies indicates:  No Known Allergies  Precautions: none        Subjective     Patient States: chronic knee pain due to injury   Recent menisectomy  3/2/23  Onset: insidious 11/22  Involved Area/Location: left  Current Symptoms: pain and swelling  Popping/clicking: denies  Lower extremity gives way: denies  Locking episodes: denies    Aggravating Factors: ascending stairs  Relieving Factors: relaxation and pain medication    Diagnostic Tests: Radiographs    Pain Scale: Zunilda rates pain on a scale of 0-10 to be 4 at worst; 3 currently; 3 at best .    Patient Goals: perform work related activities without pain      Objective     Observation: healthy appearing female with L knee brace s/p surgery.  She is ind with no ad at this time     Posture: wfl    Range of Motion: Knee   Left Right   Flexion: 90 120   Extension 0 0     Strength: Knee   Left Right   Quadriceps 3/5 5/5   Hamstrings 3/5 5/5       Joint Mobility: hypomobile  Palpation: TTP medial joint line and mid patella   Sensation: intact to light touch    Edema:  Left: minimal  Right: absent      Gait: Kim ambulated with none.  Level of Assistance: independent  Patient displays decreased step length.   Balance: Maintains SLS 2 seconds with poor balance strategies.        TREATMENT     Time In: 115  Time Out: 200    PT Evaluation Completed? Yes  Discussed Plan of Care with patient: Yes    Zunilda received 23 minutes of therapeutic exercise including:   Nustep x 15  SLR x 30  Quad sets, x 30  TKE x 30  Toe taps x 30  Toe raises x 30  Mini squats x 30  Seated ham curls YTB x 30    Written Home Exercises Provided:   Zunilda demonstrated good understanding of the education provided. Patient demonstrated good  return demonstration of skill of exercises.    ASSESSMENT   Pt prognosis is Good.  Pt will benefit from skilled outpatient physical therapy to address the above stated deficits, provide pt/family education and to maximize pt's level of independence.     Medical necessity is demonstrated by the following IMPAIRMENTS/PROBLEMS:  1. Pain left knee  2. Stiffness left knee  3. Weakness left knee   4. Limited adls   5. Difficulty walking       Pt's spiritual, cultural and educational needs considered and pt agreeable to plan of care and goals as stated below:     Anticipated Barriers for physical therapy:     Short Term GOALS: 3 weeks. Pt agrees with goals set.  1. Patient demonstrates independence with HEP.   2. 0-100 deg flexion   3. 4/5 strength     Long Term GOALS: 6  weeks. Pt agrees with goals set.  1. Full knee rom L knee   2. Good strength Left knee   3. Ind normal gait in community   4. Pain free  5. Return to PLF   6. FOTO + 15    Functional Limitations Reports - G Codes  Category: mobility  Tool: LEFS  Score: 54/80    TEST SCORE  Modifier  Impairment Limitation Restriction    80/80  CH  0 % impaired, limited or restricted   64-79/80  CI  @ least 1% but less than 20% impaired, limited or restricted   49-63/80  CJ  @ least 20%<40% impaired, limited or restricted   33-48/80  CK  @ least 40%<60% impaired, limited or restricted   17-32/80  CL  @ least 60% <80% impaired, limited or restricted   1-16/80  CM  @ least 80%<100% impaired limited or restricted   0/80  CN  100% impaired, limited or restricted     PLAN     Outpatient physical therapy 2 times weekly to include: pt ed, hep, therapeutic exercises, neuromuscular re-education/ balance exercises, joint mobilizations, aquatic therapy and modalities prn. Cont PT for  6 weeks. Pt may be seen by PTA as part of the rehabilitation team.     Therapist: Estephania Mack, PT    I certify the need for these services furnished under this plan of treatment and while under my  care.  ____________________________________ Physician/Referring Practitioner   Date of Signature

## 2023-03-20 ENCOUNTER — TELEPHONE (OUTPATIENT)
Dept: ORTHOPEDICS | Facility: CLINIC | Age: 58
End: 2023-03-20
Payer: COMMERCIAL

## 2023-03-20 NOTE — TELEPHONE ENCOUNTER
Returned call. [facility] for patient to call back to address the questions about her documents.    ----- Message -----  From: Litzy De La Rosa  Sent: 3/17/2023  10:50 AM CDT  To: [facility] Staff  Subject: pt call back                                     Name of Who is Calling: CASSIDY PRITCHETT [[id number]]        What is the request in detail: Needs provider to fill out supplement insurance document for disability policy.          Can the clinic reply by [facility]: no          What Number to Call Back if not in [facility]: [phone]

## 2023-03-21 ENCOUNTER — CLINICAL SUPPORT (OUTPATIENT)
Dept: REHABILITATION | Facility: HOSPITAL | Age: 58
End: 2023-03-21
Payer: COMMERCIAL

## 2023-03-21 DIAGNOSIS — M94.262 CHONDROMALACIA, LEFT KNEE: Primary | ICD-10-CM

## 2023-03-21 PROCEDURE — 97112 NEUROMUSCULAR REEDUCATION: CPT | Mod: CQ

## 2023-03-21 PROCEDURE — 97140 MANUAL THERAPY 1/> REGIONS: CPT | Mod: CQ

## 2023-03-21 PROCEDURE — 97110 THERAPEUTIC EXERCISES: CPT | Mod: CQ

## 2023-03-21 NOTE — PROGRESS NOTES
Physical Therapy Daily Note     Name: Zunilda Alvarez  Clinic Number: [id number]  Diagnosis:   Encounter Diagnosis   Name Primary?    Chondromalacia, left knee Yes     Physician: Ha Chawla, DO  Precautions: None  Visit #: 2 of 12  PTA Visit #: 1  Time In: 200  Time Out: 300    Subjective     Pt reports: My knee is sore but that's to be expected.  Pain Scale: Zunilda rates pain on a scale of 0-10 to be 5 currently.    Objective     Zunilda received individual therapeutic exercises (16 minutes) to develop strength, endurance, ROM, flexibility, posture, and core stabilization, which included:    NuStep x10    LLE  -quad sets x15  -SL hip flex x15  -heel slides x15    Zunilda received individual neuromuscular re-education (32 minutes) to facilitate muscular re-education to initiate improved range of motion, balance, and reduction of pain, which included:     Inhibitory techniques to Left quads, HS, Add group combined with PROM    Contract/Relax stretch of left knee    Zunilda received individual therapeutic activities to initiate improved balance and functional mobility which included:       Zunilda received the following manual therapy techniques (12 minutes) scar tissue mobilization and patellar mobilization    Written Home Exercises Provided: left knee ROM exercises  Pt demo good understanding of the education provided. Zunilda demonstrated good return demonstration of activities.     Education provided re: Provided patient education HEP, posture and postioning to reduce pain.  Zunilda verbalized good understanding of education provided.   No spiritual or educational barriers to learning provided    Assessment     Observed patient tolerated exercises and modalities well w/o adverse effects, modified to stay within patient's ROM and pain. Patient demonstrates improved left knee ROM, improved gait, and reduced pain post session.      This is a 58 y.o. female  referred to outpatient physical therapy and presents with a medical diagnosis of left knee pain and demonstrates limitations as described in the problem list. Pt prognosis is Good. Pt will continue to benefit from skilled outpatient physical therapy to address the deficits listed in the problem list, provide pt/family education and to maximize pt's level of independence in the home and community environment.     Goals as follows:  Short Term GOALS: 3 weeks. Pt agrees with goals set.  1. Patient demonstrates independence with HEP.   2. 0-100 deg flexion   3. 4/5 strength      Long Term GOALS: 6  weeks. Pt agrees with goals set.  1. Full knee rom L knee   2. Good strength Left knee   3. Ind normal gait in community   4. Pain free  5. Return to PLF   6. FOTO + 15     Plan     Continue with established Plan of Care towards PT goals.    Therapist: Precious Jalloh, PTA  3/21/2023

## 2023-03-22 ENCOUNTER — TELEPHONE (OUTPATIENT)
Dept: ORTHOPEDICS | Facility: CLINIC | Age: 58
End: 2023-03-22
Payer: COMMERCIAL

## 2023-03-22 NOTE — TELEPHONE ENCOUNTER
Returned call. The patient stated her place of employment had a form that needed to be filled out. I stated she could drop it off at the office or have it faxed to [phone]. The patient stated she would fax the form to our office tomorrow, 3/23/23, to be filled out. I stated I would get it completed as soon as possible, but please allow for 3-5 business days for the form to be completed, signed, and faxed.      ----- Message from Raleigh Franklin sent at 3/22/2023  4:08 PM CDT -----  Regarding: Returned Alexandrea's call try again [phone]  Contact: pt  [phone]  Returned Alexandrea's call try again [phone]

## 2023-03-23 ENCOUNTER — CLINICAL SUPPORT (OUTPATIENT)
Dept: REHABILITATION | Facility: HOSPITAL | Age: 58
End: 2023-03-23
Payer: COMMERCIAL

## 2023-03-23 DIAGNOSIS — M94.262 CHONDROMALACIA, LEFT KNEE: Primary | ICD-10-CM

## 2023-03-23 PROCEDURE — 97112 NEUROMUSCULAR REEDUCATION: CPT | Mod: CQ

## 2023-03-23 PROCEDURE — 97140 MANUAL THERAPY 1/> REGIONS: CPT | Mod: CQ

## 2023-03-23 PROCEDURE — 97110 THERAPEUTIC EXERCISES: CPT | Mod: CQ

## 2023-03-23 NOTE — PROGRESS NOTES
Physical Therapy Daily Note     Name: Zunilda Alvarez  Clinic Number: [id number]  Diagnosis:   Encounter Diagnosis   Name Primary?    Chondromalacia, left knee Yes     Physician: Ha Chawla, DO  Precautions: None  Visit #: 3 of 12  PTA Visit #: 2  Time In: 1100  Time Out: 1200    Subjective     Pt reports: My knee is getting better but every once in a while I get this sharp pain on the side of my knee where the incision is.  Pain Scale: Zunilda rates pain on a scale of 0-10 to be 4 currently.    Objective     Zunilda received individual therapeutic exercises (16 minutes) to develop strength, endurance, ROM, flexibility, posture, and core stabilization, which included:    NuStep x10    LLE  -heel slides x15    Zunilda received individual neuromuscular re-education (32 minutes) to facilitate muscular re-education to initiate improved range of motion, balance, and reduction of pain, which included:     Inhibitory techniques to origin/insertion points of knee and gastrocnemius combined with PROM    Contract/Relax stretch of left knee    Zunilda received individual therapeutic activities to initiate improved balance and functional mobility which included:       Zunilda received the following manual therapy techniques (12 minutes) scar tissue mobilization and patellar mobilization    Written Home Exercises Provided: left knee ROM exercises  Pt demo good understanding of the education provided. Zunilda demonstrated good return demonstration of activities.     Education provided re: Provided patient education HEP, posture and postioning to reduce pain.  Zunilda verbalized good understanding of education provided.   No spiritual or educational barriers to learning provided    Assessment     Observed patient demonstrates antalgic gait secondary to pain and diagnosis. Patient tolerated exercises and modalities well w/o adverse effects, modified to stay within patient's ROM  and pain. Patient demonstrates improved left knee ROM, improved gait, and reduced pain post session.      This is a 58 y.o. female referred to outpatient physical therapy and presents with a medical diagnosis of left knee pain and demonstrates limitations as described in the problem list. Pt prognosis is Good. Pt will continue to benefit from skilled outpatient physical therapy to address the deficits listed in the problem list, provide pt/family education and to maximize pt's level of independence in the home and community environment.     Goals as follows:  Short Term GOALS: 3 weeks. Pt agrees with goals set.  1. Patient demonstrates independence with HEP.   2. 0-100 deg flexion   3. 4/5 strength      Long Term GOALS: 6  weeks. Pt agrees with goals set.  1. Full knee rom L knee   2. Good strength Left knee   3. Ind normal gait in community   4. Pain free  5. Return to PLF   6. FOTO + 15     Plan     Continue with established Plan of Care towards PT goals.    Therapist: Precious Jalloh, PTA  3/23/2023

## 2023-03-28 ENCOUNTER — CLINICAL SUPPORT (OUTPATIENT)
Dept: REHABILITATION | Facility: HOSPITAL | Age: 58
End: 2023-03-28
Payer: COMMERCIAL

## 2023-03-28 DIAGNOSIS — M94.262 CHONDROMALACIA, LEFT KNEE: Primary | ICD-10-CM

## 2023-03-28 PROCEDURE — 97140 MANUAL THERAPY 1/> REGIONS: CPT

## 2023-03-28 PROCEDURE — 97110 THERAPEUTIC EXERCISES: CPT

## 2023-03-28 PROCEDURE — 97112 NEUROMUSCULAR REEDUCATION: CPT

## 2023-03-29 ENCOUNTER — DOCUMENTATION ONLY (OUTPATIENT)
Dept: ORTHOPEDICS | Facility: CLINIC | Age: 58
End: 2023-03-29
Payer: COMMERCIAL

## 2023-03-29 NOTE — PROGRESS NOTES
[facility] [employer] Physician statement was successfully faxed to [phone].    Batched to medical records.

## 2023-03-31 ENCOUNTER — CLINICAL SUPPORT (OUTPATIENT)
Dept: REHABILITATION | Facility: HOSPITAL | Age: 58
End: 2023-03-31
Payer: COMMERCIAL

## 2023-03-31 DIAGNOSIS — M94.262 CHONDROMALACIA, LEFT KNEE: Primary | ICD-10-CM

## 2023-03-31 PROCEDURE — 97112 NEUROMUSCULAR REEDUCATION: CPT

## 2023-03-31 PROCEDURE — 97110 THERAPEUTIC EXERCISES: CPT

## 2023-03-31 NOTE — PROGRESS NOTES
Physical Therapy Daily Note     Name: Zunilda Alvarez  Clinic Number: [id number]  Diagnosis:   Encounter Diagnosis   Name Primary?    Chondromalacia, left knee Yes     Physician: Ha Chawla, DO  Precautions: None  Visit #:4 of 12  PTA Visit #: 2  Time In: 230  Time Out: 325    Subjective     Pt reports: My knee is getting better but every once in a while I get this sharp pain on the side of my knee where the incision is.  Pain Scale: Zunilda rates pain on a scale of 0-10 to be 4 currently.    Objective     Zunilda received individual therapeutic exercises (16 minutes) to develop strength, endurance, ROM, flexibility, posture, and core stabilization, which included:    NuStep x10    LLE  -heel slides x15    Zunilda received individual neuromuscular re-education (32 minutes) to facilitate muscular re-education to initiate improved range of motion, balance, and reduction of pain, which included:     Inhibitory techniques to origin/insertion points of knee and gastrocnemius combined with PROM    Contract/Relax stretch of left knee    Zunilda received individual therapeutic activities to initiate improved balance and functional mobility which included:       Zunilda received the following manual therapy techniques (12 minutes) scar tissue mobilization and patellar mobilization    Written Home Exercises Provided: left knee ROM exercises  Pt demo good understanding of the education provided. Zunilda demonstrated good return demonstration of activities.     Education provided re: Provided patient education HEP, posture and postioning to reduce pain.  Zunilda verbalized good understanding of education provided.   No spiritual or educational barriers to learning provided    Assessment     Observed patient demonstrates antalgic gait secondary to pain and diagnosis. Patient tolerated exercises and modalities well w/o adverse effects, modified to stay within patient's ROM and  pain. Patient demonstrates improved left knee ROM, improved gait, and reduced pain post session.      This is a 58 y.o. female referred to outpatient physical therapy and presents with a medical diagnosis of left knee pain and demonstrates limitations as described in the problem list. Pt prognosis is Good. Pt will continue to benefit from skilled outpatient physical therapy to address the deficits listed in the problem list, provide pt/family education and to maximize pt's level of independence in the home and community environment.     Goals as follows:  Short Term GOALS: 3 weeks. Pt agrees with goals set.  1. Patient demonstrates independence with HEP.   2. 0-100 deg flexion   3. 4/5 strength      Long Term GOALS: 6  weeks. Pt agrees with goals set.  1. Full knee rom L knee   2. Good strength Left knee   3. Ind normal gait in community   4. Pain free  5. Return to PLF   6. FOTO + 15     Plan     Continue with established Plan of Care towards PT goals.    Therapist: Estephania Mack, PT  3/31/2023

## 2023-03-31 NOTE — PROGRESS NOTES
PT mansi completed                                                    Physical Therapy Daily Note     Name: Zunilda Alvarez  Clinic Number: [id number]  Diagnosis:   Encounter Diagnosis   Name Primary?    Chondromalacia, left knee Yes     Physician: Ha Chawla, DO  Precautions: None  Visit #:5 of 12  PTA Visit #: 2  Time In: 230  Time Out: 325    Subjective     Pt reports: My knee is getting better but every once in a while I get this sharp pain on the side of my knee where the incision is.  Pain Scale: Zunilda rates pain on a scale of 0-10 to be 4 currently.    Objective     Zunilda received individual therapeutic exercises (23 minutes) to develop strength, endurance, ROM, flexibility, posture, and core stabilization, which included:    NuStep x10    LLE  -heel slides x15    Zunilda received individual neuromuscular re-education (32 minutes) to facilitate muscular re-education to initiate improved range of motion, balance, and reduction of pain, which included:     Inhibitory techniques to origin/insertion points of knee and gastrocnemius combined with PROM    Contract/Relax stretch of left knee    Zunilda received individual therapeutic activities to initiate improved balance and functional mobility which included:       Zunilda received the following manual therapy techniques (12 minutes) scar tissue mobilization and patellar mobilization np    Written Home Exercises Provided: left knee ROM exercises  Pt demo good understanding of the education provided. Zunilda demonstrated good return demonstration of activities.     Education provided re: Provided patient education HEP, posture and postioning to reduce pain.  Zunilda verbalized good understanding of education provided.   No spiritual or educational barriers to learning provided    Assessment     Observed patient demonstrates antalgic gait secondary to pain and diagnosis. Patient tolerated exercises and modalities well w/o adverse effects, modified to stay  within patient's ROM and pain. Patient demonstrates improved left knee ROM, improved gait, and reduced pain post session.      This is a 58 y.o. female referred to outpatient physical therapy and presents with a medical diagnosis of left knee pain and demonstrates limitations as described in the problem list. Pt prognosis is Good. Pt will continue to benefit from skilled outpatient physical therapy to address the deficits listed in the problem list, provide pt/family education and to maximize pt's level of independence in the home and community environment.     Goals as follows:  Short Term GOALS: 3 weeks. Pt agrees with goals set.  1. Patient demonstrates independence with HEP.   2. 0-100 deg flexion   3. 4/5 strength      Long Term GOALS: 6  weeks. Pt agrees with goals set.  1. Full knee rom L knee   2. Good strength Left knee   3. Ind normal gait in community   4. Pain free  5. Return to PLF   6. FOTO + 15     Plan     Continue with established Plan of Care towards PT goals.    Therapist: Estephania Mack, PT  3/31/2023

## 2023-04-03 ENCOUNTER — CLINICAL SUPPORT (OUTPATIENT)
Dept: REHABILITATION | Facility: HOSPITAL | Age: 58
End: 2023-04-03
Payer: COMMERCIAL

## 2023-04-03 DIAGNOSIS — M94.262 CHONDROMALACIA, LEFT KNEE: Primary | ICD-10-CM

## 2023-04-03 PROCEDURE — 97530 THERAPEUTIC ACTIVITIES: CPT | Mod: CQ

## 2023-04-03 PROCEDURE — 97110 THERAPEUTIC EXERCISES: CPT | Mod: CQ

## 2023-04-03 PROCEDURE — 97112 NEUROMUSCULAR REEDUCATION: CPT | Mod: CQ

## 2023-04-03 NOTE — PROGRESS NOTES
Physical Therapy Daily Note     Name: Zunilda Alvarez  Clinic Number: [id number]  Diagnosis:   Encounter Diagnosis   Name Primary?    Chondromalacia, left knee Yes     Physician: Ha Chawla, DO  Precautions: None  Visit #: 7 of 12  PTA Visit #: 3  Time In: 200  Time Out: 300    Subjective     Pt reports: My knee is doing good.  Pain Scale: Zunilda rates pain on a scale of 0-10 to be 1 currently.    Objective     Zunilda received individual therapeutic exercises (18 minutes) to develop strength, endurance, ROM, flexibility, posture, and core stabilization, which included:    NuStep x10    Supine  -SL hip flex x20    Side Lying  -clam shells x20    Zunilda received individual neuromuscular re-education (28 minutes) to facilitate muscular re-education to initiate improved range of motion, balance, and reduction of pain, which included:     Supine utilizing ball  -knee-to-chest x20  -bridges x20    Unsupported sitting utilizing BluTB with slow/controlled movement  -knee flex x20  -knee ext x20  -hip flex x20  -hip ext x20  -hip Abd x20  -hip Add x20      Zunilda received individual therapeutic activities (14 minutes) to initiate improved balance and functional mobility which included:     Several dynamic standing BLE exercises on Uneven surface  -SL hip flex x20  -SL hip Abd x20  -march-in-place x20  -step-ups x20    -side stepping on hard surface      Zunilda received the following manual therapy techniques (0 minutes)     Written Home Exercises Provided: left knee ROM exercises  Pt demo good understanding of the education provided. Zunilda demonstrated good return demonstration of activities.     Education provided re: Provided patient education HEP, posture and postioning to reduce pain.  Zunilda verbalized good understanding of education provided.   No spiritual or educational barriers to learning provided    Assessment     Observed patient demonstrates progress  by increasing resistance and improved gait/balance. Patient tolerated all exercises well w/o adverse effects.      This is a 58 y.o. female referred to outpatient physical therapy and presents with a medical diagnosis of left knee pain and demonstrates limitations as described in the problem list. Pt prognosis is Good. Pt will continue to benefit from skilled outpatient physical therapy to address the deficits listed in the problem list, provide pt/family education and to maximize pt's level of independence in the home and community environment.     Goals as follows:  Short Term GOALS: 3 weeks. Pt agrees with goals set.  1. Patient demonstrates independence with HEP.   2. 0-100 deg flexion   3. 4/5 strength      Long Term GOALS: 6  weeks. Pt agrees with goals set.  1. Full knee rom L knee   2. Good strength Left knee   3. Ind normal gait in community   4. Pain free  5. Return to [facility]   6. FOTO + 15     Plan     Continue with established Plan of Care towards PT goals.    Therapist: Precious Jalloh, PTA  4/3/2023

## 2023-04-05 ENCOUNTER — CLINICAL SUPPORT (OUTPATIENT)
Dept: REHABILITATION | Facility: HOSPITAL | Age: 58
End: 2023-04-05
Payer: COMMERCIAL

## 2023-04-05 DIAGNOSIS — M94.262 CHONDROMALACIA, LEFT KNEE: Primary | ICD-10-CM

## 2023-04-05 PROCEDURE — 97140 MANUAL THERAPY 1/> REGIONS: CPT | Mod: CQ

## 2023-04-05 PROCEDURE — 97112 NEUROMUSCULAR REEDUCATION: CPT | Mod: CQ

## 2023-04-05 PROCEDURE — 97530 THERAPEUTIC ACTIVITIES: CPT | Mod: CQ

## 2023-04-05 PROCEDURE — 97110 THERAPEUTIC EXERCISES: CPT | Mod: CQ

## 2023-04-05 NOTE — PROGRESS NOTES
Physical Therapy Daily Note     Name: Zunilda Alvarez  Clinic Number: [id number]  Diagnosis:   Encounter Diagnosis   Name Primary?    Chondromalacia, left knee Yes     Physician: Ha Chawla, DO  Precautions: None  Visit #: 8 of 12  PTA Visit #: 4  Time In: 200  Time Out: 300    Subjective     Pt reports: I have this pain on the outside of my left knee where the incision was.  Pain Scale: Zunilda rates pain on a scale of 0-10 to be 3 currently.    Objective     Zunilda received individual therapeutic exercises (13 minutes) to develop strength, endurance, ROM, flexibility, posture, and core stabilization, which included:    NuStep x10    Supine  -SL hip flex x20    Zunilda received individual neuromuscular re-education (21 minutes) to facilitate muscular re-education to initiate improved range of motion, balance, and reduction of pain, which included:     Unsupported sitting utilizing BluTB with slow/controlled movement  -knee flex x20  -knee ext x20  -hip flex x20  -hip ext x20  -hip Abd x20  -hip Add x20      Zunilda received individual therapeutic activities (14 minutes) to initiate improved balance and functional mobility which included:     Several dynamic standing BLE exercises on Uneven surface  -SL hip flex x20  -SL hip Abd x20  -march-in-place x20    Zunilda received the following manual therapy techniques (12 minutes): scar tissue mobilization and patellar mobilization to left knee    Written Home Exercises Provided: left knee ROM exercises  Pt demo good understanding of the education provided. Zunilda demonstrated good return demonstration of activities.     Education provided re: Provided patient education HEP, posture and postioning to reduce pain.  Zunilda verbalized good understanding of education provided.   No spiritual or educational barriers to learning provided    Assessment     Observed patient tolerated modalities well w/o adverse effects,  Letter was sent, dated 1/17/2020. Letter was returned by post office...   modified to stay within pain tolerance. Patient demonstrates difficulty with step-ups secondary to pain this date.     This is a 58 y.o. female referred to outpatient physical therapy and presents with a medical diagnosis of left knee pain and demonstrates limitations as described in the problem list. Pt prognosis is Good. Pt will continue to benefit from skilled outpatient physical therapy to address the deficits listed in the problem list, provide pt/family education and to maximize pt's level of independence in the home and community environment.     Goals as follows:  Short Term GOALS: 3 weeks. Pt agrees with goals set.  1. Patient demonstrates independence with HEP.   2. 0-100 deg flexion   3. 4/5 strength      Long Term GOALS: 6  weeks. Pt agrees with goals set.  1. Full knee rom L knee   2. Good strength Left knee   3. Ind normal gait in community   4. Pain free  5. Return to [facility]   6. FOTO + 15     Plan     Continue with established Plan of Care towards PT goals.    Therapist: Precious Jalloh, PTA  4/5/2023

## 2023-04-10 ENCOUNTER — CLINICAL SUPPORT (OUTPATIENT)
Dept: REHABILITATION | Facility: HOSPITAL | Age: 58
End: 2023-04-10
Payer: COMMERCIAL

## 2023-04-10 DIAGNOSIS — M94.262 CHONDROMALACIA, LEFT KNEE: Primary | ICD-10-CM

## 2023-04-10 PROCEDURE — 97140 MANUAL THERAPY 1/> REGIONS: CPT | Mod: CQ

## 2023-04-10 PROCEDURE — 97112 NEUROMUSCULAR REEDUCATION: CPT | Mod: CQ

## 2023-04-10 PROCEDURE — 97110 THERAPEUTIC EXERCISES: CPT | Mod: CQ

## 2023-04-10 PROCEDURE — 97530 THERAPEUTIC ACTIVITIES: CPT | Mod: CQ

## 2023-04-10 NOTE — PROGRESS NOTES
Physical Therapy Daily Note     Name: Zunilda Alvarez  Clinic Number: [id number]  Diagnosis:   Encounter Diagnosis   Name Primary?    Chondromalacia, left knee Yes     Physician: Ha Chawla, DO  Precautions: None  Visit #: 9 of 12  PTA Visit #: 5  Time In: 200  Time Out: 300    Subjective     Pt reports: My knee is getting better. Not much pain anymore.  Pain Scale: Zunilda rates pain on a scale of 0-10 to be 2 currently.    Objective     Zunilda received individual therapeutic exercises (16 minutes) to develop strength, endurance, ROM, flexibility, posture, and core stabilization, which included:    NuStep x10    Supine  -SL hip flex x20  -heel slides x20  -quad sets x20    Zunilda received individual neuromuscular re-education (21 minutes) to facilitate muscular re-education to initiate improved range of motion, balance, and reduction of pain, which included:     Unsupported sitting utilizing BluTB with slow/controlled movement  -knee flex x20  -knee ext x20  -hip flex x20  -hip ext x20  -hip Abd x20  -hip Add x20      Zunilda received individual therapeutic activities (14 minutes) to initiate improved balance and functional mobility which included:     Several dynamic standing BLE exercises on Uneven surface  -SL hip flex x20  -SL hip Abd x20  -march-in-place x20    Zunilda received the following manual therapy techniques (9 minutes): scar tissue mobilization and patellar mobilization to left knee    Written Home Exercises Provided: left knee ROM exercises  Pt demo good understanding of the education provided. Zunilda demonstrated good return demonstration of activities.     Education provided re: Provided patient education HEP, posture and postioning to reduce pain.  Zunilda verbalized good understanding of education provided.   No spiritual or educational barriers to learning provided    Assessment     Observed patient tolerated modalities well w/o adverse  effects, modified to stay within pain tolerance. Patient demonstrates difficulty with step-ups secondary to pain this date.     This is a 58 y.o. female referred to outpatient physical therapy and presents with a medical diagnosis of left knee pain and demonstrates limitations as described in the problem list. Pt prognosis is Good. Pt will continue to benefit from skilled outpatient physical therapy to address the deficits listed in the problem list, provide pt/family education and to maximize pt's level of independence in the home and community environment.     Goals as follows:  Short Term GOALS: 3 weeks. Pt agrees with goals set.  1. Patient demonstrates independence with HEP.   2. 0-100 deg flexion   3. 4/5 strength      Long Term GOALS: 6  weeks. Pt agrees with goals set.  1. Full knee rom L knee   2. Good strength Left knee   3. Ind normal gait in community   4. Pain free  5. Return to PL[facility]   6. FOTO + 15     Plan     Continue with established Plan of Care towards PT goals.    Therapist: Precious Jalloh, PTA  4/10/2023

## 2023-04-14 ENCOUNTER — CLINICAL SUPPORT (OUTPATIENT)
Dept: REHABILITATION | Facility: HOSPITAL | Age: 58
End: 2023-04-14
Payer: COMMERCIAL

## 2023-04-14 DIAGNOSIS — M94.262 CHONDROMALACIA, LEFT KNEE: Primary | ICD-10-CM

## 2023-04-14 PROCEDURE — 97140 MANUAL THERAPY 1/> REGIONS: CPT

## 2023-04-14 PROCEDURE — 97014 ELECTRIC STIMULATION THERAPY: CPT

## 2023-04-14 PROCEDURE — 97110 THERAPEUTIC EXERCISES: CPT

## 2023-04-14 NOTE — PROGRESS NOTES
Physical Therapy Daily Note     Name: Zunilda Alvarez  Clinic Number: [id number]  Diagnosis:   Encounter Diagnosis   Name Primary?    Chondromalacia, left knee Yes     Physician: Ha Chawla,   Precautions: none   Visit #: 10 of 12  PTA Visit #: 0  Time In: 200  Time Out: 245    Subjective     Pt reports: her knee is hurting today   Pain Scale: Zunilda rates pain on a scale of 0-10 to be 5 currently.    Objective     Zunilda received individual therapeutic exercises to develop strength, endurance, ROM, and flexibility for 23 minutes including:  Nustep x 15  Seated ham curls RTB x 40  Kick stands RTB x 40  TKE 3# x 40  SAQ on bolster 3# x 40      Zunilda received the following manual therapy techniques: Joint mobilizations and Soft tissue Mobilization were applied to the: left knee  for 8 minutes including:  Patellar mobs all directions  Scar pump to incisions (static and dynamic)         The patient received the following supervised modalities after being cleared for contradictions: IFC Electrical Stimulation:  Zunilda received IFC Electrical Stimulation for pain control applied to the left knee. Pt received stimulation at 100 % scan at a frequency of  for 15 minutes. Zunilda tolderated treatment well without any adverse effects.      Written Home Exercises Provided: reviewed with patient   Pt demo good understanding of the education provided. Zunilda demonstrated good return demonstration of activities.     Education provided re:  Zunilda verbalized good understanding of education provided.   No spiritual or educational barriers to learning provided    Assessment     Patient tolerated scar massage and stim with no adverse side effects   This is a 58 y.o. female referred to outpatient physical therapy and presents with a medical diagnosis of left knee arthroscope  and demonstrates limitations as described in the problem list. Pt prognosis is Good. Pt will  continue to benefit from skilled outpatient physical therapy to address the deficits listed in the problem list, provide pt/family education and to maximize pt's level of independence in the home and community environment.     Goals as follows:  Short Term GOALS: 3 weeks. Pt agrees with goals set.  1. Patient demonstrates independence with HEP. MET   2. 0-100 deg flexion MET  3. 4/5 strength MET     Long Term GOALS: 6  weeks.  Progressing   2. Good strength Left knee  Progressing   3. Ind normal gait in community MET  4. Pain free Progressing   5. Return to PLF Progressing   6. FOTO + 15 Progressing      Plan     Continue with established Plan of Care towards PT goals.    Therapist: Estephania Mack, PT  4/14/2023

## 2023-04-17 ENCOUNTER — CLINICAL SUPPORT (OUTPATIENT)
Dept: REHABILITATION | Facility: HOSPITAL | Age: 58
End: 2023-04-17
Payer: COMMERCIAL

## 2023-04-17 DIAGNOSIS — M94.262 CHONDROMALACIA, LEFT KNEE: Primary | ICD-10-CM

## 2023-04-17 PROCEDURE — 97110 THERAPEUTIC EXERCISES: CPT | Mod: CQ

## 2023-04-17 PROCEDURE — 97112 NEUROMUSCULAR REEDUCATION: CPT | Mod: CQ

## 2023-04-17 PROCEDURE — 97140 MANUAL THERAPY 1/> REGIONS: CPT | Mod: CQ

## 2023-04-17 NOTE — PROGRESS NOTES
Physical Therapy Daily Note     Name: Zunilda Alvarez  Clinic Number: [id number]  Diagnosis:   Encounter Diagnosis   Name Primary?    Chondromalacia, left knee Yes     Physician: Ha Chawla, DO  Precautions: None  Visit #: 11 of 12  PTA Visit #: 6  Time In: 200  Time Out: 300    Subjective     Pt reports: The outside of my knee still really hurts a lot.  Pain Scale: Zunilda rates pain on a scale of 0-10 to be 5 currently.    Objective     Zunilda received individual therapeutic exercises (11 minutes) to develop strength, endurance, ROM, flexibility, posture, and core stabilization, which included:    NuStep x10    Zunilda received individual neuromuscular re-education (28 minutes) to facilitate muscular re-education to initiate improved range of motion, balance, and reduction of pain, which included:    Inhibitory techniques to left anterior and posterior LE combined with PROM       Zunilda received individual therapeutic activities (0 minutes) to initiate improved balance and functional mobility which included:       Zunilda received the following manual therapy techniques (21 minutes): myofascial release to anterior/posterior LE to reduce pain, improve ROM, and prepare soft muscle tissue for inhibitory techniques; scar tissue mobilization and patellar mobilization to left knee    Written Home Exercises Provided: left knee ROM exercises  Pt demo good understanding of the education provided. Zunilda demonstrated good return demonstration of activities.     Education provided re: Provided patient education HEP, posture and postioning to reduce pain.  Zunilda verbalized good understanding of education provided.   No spiritual or educational barriers to learning provided    Assessment     Observed patient tolerated exercises and modalities well w/o adverse effects, modified to stay within patient's pain tolerance, observed patient's left knee ROM and gait improved,  pain reduced to 2/10.    This is a 58 y.o. female referred to outpatient physical therapy and presents with a medical diagnosis of left knee pain and demonstrates limitations as described in the problem list. Pt prognosis is Good. Pt will continue to benefit from skilled outpatient physical therapy to address the deficits listed in the problem list, provide pt/family education and to maximize pt's level of independence in the home and community environment.     Goals as follows:  Short Term GOALS: 3 weeks. Pt agrees with goals set.  1. Patient demonstrates independence with HEP.   2. 0-100 deg flexion   3. 4/5 strength      Long Term GOALS: 6  weeks. Pt agrees with goals set.  1. Full knee rom L knee   2. Good strength Left knee   3. Ind normal gait in community   4. Pain free  5. Return to PLF   6. FOTO + 15     Plan     Continue with established Plan of Care towards PT goals.    Therapist: Precious Jalloh, PTA  4/17/2023

## 2023-04-21 ENCOUNTER — CLINICAL SUPPORT (OUTPATIENT)
Dept: REHABILITATION | Facility: HOSPITAL | Age: 58
End: 2023-04-21
Payer: COMMERCIAL

## 2023-04-21 DIAGNOSIS — M94.262 CHONDROMALACIA, LEFT KNEE: Primary | ICD-10-CM

## 2023-04-21 PROCEDURE — 97110 THERAPEUTIC EXERCISES: CPT

## 2023-04-21 PROCEDURE — 97530 THERAPEUTIC ACTIVITIES: CPT

## 2023-04-21 PROCEDURE — 97140 MANUAL THERAPY 1/> REGIONS: CPT

## 2023-04-21 PROCEDURE — 97014 ELECTRIC STIMULATION THERAPY: CPT

## 2023-04-21 NOTE — PROGRESS NOTES
Physical Therapy Daily Note     Name: Zunilda Alvarez  Clinic Number: [id number]  Diagnosis:   Encounter Diagnosis   Name Primary?    Chondromalacia, left knee Yes     Physician: Ha Chawla, DO  Precautions: none   Visit #: 12 of 12  PTA Visit #: 0  Time In: 130  Time Out: 220    Subjective     Pt reports: states her knee feels ok   Pain Scale: Zunilda rates pain on a scale of 0-10 to be 1 currently.    Objective     Zunilda received individual therapeutic exercises to develop strength, endurance, ROM, and flexibility for 17 minutes including:  Nustep x 10  Seated ham curls RTB x 40  Kick stands RTB x 40  TKE 3# x 40  SAQ on bolster 3# x 40  Leg press 50# x 30    Therapeutic activities : x 10 min   Toe taps on airex x 30  Step ups on airex  x 30  Toe raises x 30 on airex   Mini squats x 30 on airex       Zunilda received the following manual therapy techniques: Joint mobilizations and Soft tissue Mobilization were applied to the: left knee  for 8 minutes including:  Patellar mobs all directions  Scar pump to incisions (static and dynamic)         The patient received the following supervised modalities after being cleared for contradictions: IFC Electrical Stimulation:  Zunilda received IFC Electrical Stimulation for pain control applied to the left knee. Pt received stimulation at 100 % scan at a frequency of  for 15 minutes. Zunilda tolderated treatment well without any adverse effects.      Written Home Exercises Provided: reviewed with patient   Pt demo good understanding of the education provided. Zunilda demonstrated good return demonstration of activities.     Education provided re:  Zunilda verbalized good understanding of education provided.   No spiritual or educational barriers to learning provided    Assessment     Patient tolerated scar massage and stim with no adverse side effects   This is a 58 y.o. female referred to outpatient physical  therapy and presents with a medical diagnosis of left knee arthroscope  and demonstrates limitations as described in the problem list. Pt prognosis is Good. Pt will continue to benefit from skilled outpatient physical therapy to address the deficits listed in the problem list, provide pt/family education and to maximize pt's level of independence in the home and community environment.     Goals as follows:  Short Term GOALS: 3 weeks. Pt agrees with goals set.  1. Patient demonstrates independence with HEP. MET   2. 0-100 deg flexion MET  3. 4/5 strength MET     Long Term GOALS: 6  weeks.  Progressing   2. Good strength Left knee  Progressing   3. Ind normal gait in community MET  4. Pain free Progressing   5. Return to PLF Progressing   6. FOTO + 15 Progressing      Plan     Continue with established Plan of Care towards PT goals.    Therapist: Estephania Mack, PT  4/21/2023

## 2023-04-26 ENCOUNTER — TELEPHONE (OUTPATIENT)
Dept: ORTHOPEDICS | Facility: CLINIC | Age: 58
End: 2023-04-26
Payer: COMMERCIAL

## 2023-04-26 NOTE — TELEPHONE ENCOUNTER
Returned call. The patient was offered an appointment tomorrow at 9:30 A.M. in [facility]. The patient was agreeable to the appointment.     ----- Message from Kady Bledsoe sent at 4/26/2023  2:46 PM CDT -----  Regarding: Needs return call  Type: Needs Medical Advice  Who Called:  Xiomy Rodriguez Call Back Number: [phone]    Additional Information: Pt has a question and would like a call back from the office sepcifically thelma wills.

## 2023-04-27 ENCOUNTER — CLINICAL SUPPORT (OUTPATIENT)
Dept: REHABILITATION | Facility: HOSPITAL | Age: 58
End: 2023-04-27
Payer: COMMERCIAL

## 2023-04-27 ENCOUNTER — OFFICE VISIT (OUTPATIENT)
Dept: ORTHOPEDICS | Facility: CLINIC | Age: 58
End: 2023-04-27
Payer: COMMERCIAL

## 2023-04-27 VITALS — WEIGHT: 231.06 LBS | HEIGHT: 64 IN | RESPIRATION RATE: 16 BRPM | BODY MASS INDEX: 39.45 KG/M2

## 2023-04-27 DIAGNOSIS — Z98.890 STATUS POST ARTHROSCOPY OF LEFT KNEE: ICD-10-CM

## 2023-04-27 DIAGNOSIS — M94.262 CHONDROMALACIA, LEFT KNEE: Primary | ICD-10-CM

## 2023-04-27 DIAGNOSIS — M70.52 PES ANSERINUS BURSITIS OF LEFT KNEE: Primary | ICD-10-CM

## 2023-04-27 PROCEDURE — 3066F PR DOCUMENTATION OF TREATMENT FOR NEPHROPATHY: ICD-10-PCS | Mod: S$GLB,,, | Performed by: ORTHOPAEDIC SURGERY

## 2023-04-27 PROCEDURE — 1159F MED LIST DOCD IN RCRD: CPT | Mod: S$GLB,,, | Performed by: ORTHOPAEDIC SURGERY

## 2023-04-27 PROCEDURE — 99024 POSTOP FOLLOW-UP VISIT: CPT | Mod: S$GLB,,, | Performed by: ORTHOPAEDIC SURGERY

## 2023-04-27 PROCEDURE — 3051F PR MOST RECENT HEMOGLOBIN A1C LEVEL 7.0 - < 8.0%: ICD-10-PCS | Mod: S$GLB,,, | Performed by: ORTHOPAEDIC SURGERY

## 2023-04-27 PROCEDURE — 97530 THERAPEUTIC ACTIVITIES: CPT

## 2023-04-27 PROCEDURE — 1159F PR MEDICATION LIST DOCUMENTED IN MEDICAL RECORD: ICD-10-PCS | Mod: S$GLB,,, | Performed by: ORTHOPAEDIC SURGERY

## 2023-04-27 PROCEDURE — 3066F NEPHROPATHY DOC TX: CPT | Mod: S$GLB,,, | Performed by: ORTHOPAEDIC SURGERY

## 2023-04-27 PROCEDURE — 3008F PR BODY MASS INDEX (BMI) DOCUMENTED: ICD-10-PCS | Mod: S$GLB,,, | Performed by: ORTHOPAEDIC SURGERY

## 2023-04-27 PROCEDURE — 97014 ELECTRIC STIMULATION THERAPY: CPT

## 2023-04-27 PROCEDURE — 97140 MANUAL THERAPY 1/> REGIONS: CPT

## 2023-04-27 PROCEDURE — 4010F ACE/ARB THERAPY RXD/TAKEN: CPT | Mod: S$GLB,,, | Performed by: ORTHOPAEDIC SURGERY

## 2023-04-27 PROCEDURE — 3051F HG A1C>EQUAL 7.0%<8.0%: CPT | Mod: S$GLB,,, | Performed by: ORTHOPAEDIC SURGERY

## 2023-04-27 PROCEDURE — 3061F NEG MICROALBUMINURIA REV: CPT | Mod: S$GLB,,, | Performed by: ORTHOPAEDIC SURGERY

## 2023-04-27 PROCEDURE — 3008F BODY MASS INDEX DOCD: CPT | Mod: S$GLB,,, | Performed by: ORTHOPAEDIC SURGERY

## 2023-04-27 PROCEDURE — 99999 PR PBB SHADOW E&M-EST. PATIENT-LVL III: ICD-10-PCS | Mod: PBBFAC,,, | Performed by: ORTHOPAEDIC SURGERY

## 2023-04-27 PROCEDURE — 99024 PR POST-OP FOLLOW-UP VISIT: ICD-10-PCS | Mod: S$GLB,,, | Performed by: ORTHOPAEDIC SURGERY

## 2023-04-27 PROCEDURE — 20610 LARGE JOINT ASPIRATION/INJECTION: L ANSERINE BURSA: ICD-10-PCS | Mod: 79,LT,S$GLB, | Performed by: ORTHOPAEDIC SURGERY

## 2023-04-27 PROCEDURE — 97110 THERAPEUTIC EXERCISES: CPT

## 2023-04-27 PROCEDURE — 4010F PR ACE/ARB THEARPY RXD/TAKEN: ICD-10-PCS | Mod: S$GLB,,, | Performed by: ORTHOPAEDIC SURGERY

## 2023-04-27 PROCEDURE — 3061F PR NEG MICROALBUMINURIA RESULT DOCUMENTED/REVIEW: ICD-10-PCS | Mod: S$GLB,,, | Performed by: ORTHOPAEDIC SURGERY

## 2023-04-27 PROCEDURE — 99999 PR PBB SHADOW E&M-EST. PATIENT-LVL III: CPT | Mod: PBBFAC,,, | Performed by: ORTHOPAEDIC SURGERY

## 2023-04-27 PROCEDURE — 20610 DRAIN/INJ JOINT/BURSA W/O US: CPT | Mod: 79,LT,S$GLB, | Performed by: ORTHOPAEDIC SURGERY

## 2023-04-27 RX ORDER — TRIAMCINOLONE ACETONIDE 40 MG/ML
40 INJECTION, SUSPENSION INTRA-ARTICULAR; INTRAMUSCULAR
Status: DISCONTINUED | OUTPATIENT
Start: 2023-04-27 | End: 2023-04-27 | Stop reason: HOSPADM

## 2023-04-27 RX ADMIN — TRIAMCINOLONE ACETONIDE 40 MG: 40 INJECTION, SUSPENSION INTRA-ARTICULAR; INTRAMUSCULAR at 09:04

## 2023-04-27 NOTE — PROGRESS NOTES
Patient ID: Zunilda Alvarez is a 58 y.o. female.     Chief Complaint: Post-op Evaluation of the Left Knee        HPI: Ms. Alvarez  returned today for her  a 6 week follow-up visit after an arthroscopy of her left knee.  She stated she is doing well still has some minor pain in her knee.  Her date of surgery 03/02/2023. She has had 9 months of left knee pain which was exacerbated in late November 2022 after she was on a cruise and traversing a hill; she slipped and fell twisting her knee while she was in [place].  She has been going to physical therapy and the internal knee pain has improved although she still has some medial-sided tibial plateau knee pain and swelling.     ROS:  No new diagnosis/surgery/prescriptions since last office visit on 03/14/2023  Constitutional: Negative for chills and fever.   HENT:  Negative for congestion.    Eyes:  Negative for blurred vision and double vision.   Cardiovascular:  Negative for chest pain and cyanosis.   Respiratory:  Negative for cough and shortness of breath.    Endocrine: Negative for cold intolerance and polydipsia.   Hematologic/Lymphatic: Negative for adenopathy.   Skin:  Negative for flushing, itching and rash.   Musculoskeletal:  Positive for joint pain. Negative for gout.   Gastrointestinal:  Negative for constipation, diarrhea and heartburn.   Genitourinary:  Negative for nocturia.   Neurological:  Negative for headaches and seizures.   Psychiatric/Behavioral:  Negative for depression and substance abuse. The patient is not nervous/anxious.    Allergic/Immunologic: Positive for environmental allergies.       Objective:   Physical Exam:   General: AAOx3.  No acute distress  Vascular:  Pulses intact and equal bilaterally.  Capillary refill less than 3 seconds and equal bilaterally  Neurologic:  Pinprick and soft touch intact and equal bilaterally  Integment:  Incisions well approximated with sutures in place.  Extremity:  Knee:  Extension/flexion equal  bilaterally.  Mild increased excursion with Lachman's/drawer left knee.  Sharron negative both knees.  No swelling both knees.  Varus/valgus stressing with good endpoint bilaterally. Tender at the anserine insertion left knee.  Mild swelling at the anserine insertion left knee.  Radiography:  No new x-rays done today.      Assessment:       Impression:      1.  2. Pes anserine bursitis, left knee   Arthroscopy left knee with partial medial meniscectomy and plica excision with chondroplasty          Plan:       1.  Discussed physical examination with the patient. Zunilda understands that she   Appears to be doing well but does have a pes anserine bursitis of her knee.  Treatment alternatives and outcomes were discussed with the patient she understands she could continue with conservative management such as observation, activity modification, NSAIDs, bracing, physical therapy, injections, or she could consider surgical intervention such as bursectomy.  Recommend she trial conservative management a little bit longer with respect to her pes anserine bursitis.  2. Offered a steroid injection the pes anserine bursa of the left knee, she elected to proceed.  3. Finish current physical therapy prescription and discharged to [facility].  4. Recommend she purchase over-the-counter Voltaren gel and applied to her knee twice daily and massage in for 2 minutes.    5. Continue doing home exercises previously shown discussed.    6. Follow up p.r.n..  6. Follow up in 6 weeks for re-evaluation.

## 2023-04-27 NOTE — PROGRESS NOTES
Physical Therapy Daily Note     Name: Zunilda Alvarez  Clinic Number: [id number]  Diagnosis:   Encounter Diagnosis   Name Primary?    Chondromalacia, left knee Yes     Physician: Ha Chawla, DO  Precautions: none   Visit #: 12 of 12  PTA Visit #: 0  Time In: 200  Time Out:     Subjective     Pt reports: states her knee feels ok   Pain Scale: Zunilda rates pain on a scale of 0-10 to be 1 currently.    Objective     Zunilda received individual therapeutic exercises to develop strength, endurance, ROM, and flexibility for 17 minutes including:  Nustep x 10  Seated ham curls RTB x 40  Kick stands RTB x 40  TKE 3# x 40  SAQ on bolster 3# x 40  Leg press 50# x 30    Therapeutic activities : x 10 min   Toe taps on airex x 30  Step ups on airex  x 30  Toe raises x 30 on airex   Mini squats x 30 on airex       Zunilda received the following manual therapy techniques: Joint mobilizations and Soft tissue Mobilization were applied to the: left knee  for 8 minutes including:  Patellar mobs all directions  Scar pump to incisions (static and dynamic)         The patient received the following supervised modalities after being cleared for contradictions: IFC Electrical Stimulation:  Zunilda received IFC Electrical Stimulation for pain control applied to the left knee. Pt received stimulation at 100 % scan at a frequency of  for 15 minutes. Zunilda tolderated treatment well without any adverse effects.      Written Home Exercises Provided: reviewed with patient   Pt demo good understanding of the education provided. Zunilda demonstrated good return demonstration of activities.     Education provided re:  Zunilda verbalized good understanding of education provided.   No spiritual or educational barriers to learning provided    Assessment     Patient tolerated scar massage and stim with no adverse side effects   This is a 58 y.o. female referred to outpatient physical therapy  and presents with a medical diagnosis of left knee arthroscope  and demonstrates limitations as described in the problem list. Pt prognosis is Good. Pt will continue to benefit from skilled outpatient physical therapy to address the deficits listed in the problem list, provide pt/family education and to maximize pt's level of independence in the home and community environment.     Goals as follows:  Short Term GOALS: 3 weeks. Pt agrees with goals set.  1. Patient demonstrates independence with HEP. MET   2. 0-100 deg flexion MET  3. 4/5 strength MET     Long Term GOALS: 6  weeks.  Progressing   2. Good strength Left knee  Progressing   3. Ind normal gait in community MET  4. Pain free Progressing   5. Return to PLF Progressing   6. FOTO + 15 MET     Plan     Discharge from PT  Met max potential   Therapist: Estephania Mack, PT  4/27/2023

## 2023-04-27 NOTE — PROCEDURES
Large Joint Aspiration/Injection: L anserine bursa    Date/Time: 4/27/2023 9:30 AM  Performed by: Ha Chawla DO  Authorized by: Ha Chawla, DO     Consent Done?:  Yes (Verbal)  Indications:  Diagnostic evaluation and pain  Site marked: the procedure site was marked    Timeout: prior to procedure the correct patient, procedure, and site was verified    Prep: patient was prepped and draped in usual sterile fashion      Details:  Needle Size:  22 G  Ultrasonic Guidance for needle placement?: No    Approach:  Anteromedial  Location:  Knee  Site:  L anserine bursa  Medications:  40 mg triamcinolone acetonide 40 mg/mL  Patient tolerance:  Patient tolerated the procedure well with no immediate complications

## 2023-05-24 PROBLEM — F32.A DEPRESSION: Status: ACTIVE | Noted: 2023-05-24

## 2023-05-24 PROBLEM — I10 HYPERTENSION: Status: ACTIVE | Noted: 2023-05-05

## 2023-05-24 PROBLEM — Z79.4 TYPE 2 DIABETES MELLITUS WITH HYPERGLYCEMIA, WITH LONG-TERM CURRENT USE OF INSULIN: Status: ACTIVE | Noted: 2023-05-05

## 2023-05-24 PROBLEM — R00.2 PALPITATIONS: Status: ACTIVE | Noted: 2023-05-05

## 2023-05-24 PROBLEM — E11.65 TYPE 2 DIABETES MELLITUS WITH HYPERGLYCEMIA, WITH LONG-TERM CURRENT USE OF INSULIN: Status: ACTIVE | Noted: 2023-05-05

## 2023-05-24 PROBLEM — S83.412A SPRAIN OF MEDIAL COLLATERAL LIGAMENT OF LEFT KNEE: Status: RESOLVED | Noted: 2022-12-13 | Resolved: 2023-05-24

## 2023-05-24 PROBLEM — K21.00 GASTROESOPHAGEAL REFLUX DISEASE WITH ESOPHAGITIS: Status: RESOLVED | Noted: 2019-08-07 | Resolved: 2023-05-24

## 2023-05-24 PROBLEM — M94.262 CHONDROMALACIA, LEFT KNEE: Status: RESOLVED | Noted: 2023-03-02 | Resolved: 2023-05-24

## 2023-08-02 ENCOUNTER — TELEPHONE (OUTPATIENT)
Dept: ORTHOPEDICS | Facility: CLINIC | Age: 58
End: 2023-08-02
Payer: COMMERCIAL

## 2023-08-02 NOTE — TELEPHONE ENCOUNTER
Returned call. [facility] stating Dr. Chawla is out of office until 8/15/23. I stated that her scheduled appointment on 8/29/23 is the soonest he has after he back. I stated for her to call back with any questions or concerns.    ----- Message from Rajani Hernández sent at 8/2/2023  1:53 PM CDT -----  Contact: Pt  Type:  Needs Medical Advice    Who Called: Pt  Would the patient rather a call back or a response via [facility]? call  Best Call Back Number: [phone]  Additional Information: Pt has an appt on 08/29/2023, and would like a sooner appt. Please call pt back to advise.

## 2023-08-02 NOTE — TELEPHONE ENCOUNTER
Returned call. I stated Dr. Chawla is out of town until 8/15/23 and is booked until the appointment she was able to scheduled on 8/29/23. The patient stated understanding and she would like to be added to the wait list. I stated understanding.    ----- Message from Aliza Solorio sent at 8/2/2023  3:34 PM CDT -----  Contact: Patient  Type:  Patient Returning Call    Who Called:  Patient  Who Left Message for Patient:   Not sure  Does the patient know what this is regarding?:   Possibly sooner appointment    Would the patient rather a call back or a response via [facility]?   Call back  Best Call Back Number:  [phone]    Additional Information: States she is returning a missed call - please call to advise - thank you

## 2023-08-29 ENCOUNTER — OFFICE VISIT (OUTPATIENT)
Dept: ORTHOPEDICS | Facility: CLINIC | Age: 58
End: 2023-08-29
Payer: COMMERCIAL

## 2023-08-29 VITALS — OXYGEN SATURATION: 95 % | HEIGHT: 64 IN | WEIGHT: 228 LBS | HEART RATE: 91 BPM | BODY MASS INDEX: 38.93 KG/M2

## 2023-08-29 DIAGNOSIS — M25.562 ACUTE PAIN OF LEFT KNEE: ICD-10-CM

## 2023-08-29 DIAGNOSIS — M71.22 BAKER CYST, LEFT: ICD-10-CM

## 2023-08-29 DIAGNOSIS — M17.12 PRIMARY OSTEOARTHRITIS OF LEFT KNEE: ICD-10-CM

## 2023-08-29 DIAGNOSIS — S83.242D ACUTE MEDIAL MENISCUS TEAR, LEFT, SUBSEQUENT ENCOUNTER: Primary | ICD-10-CM

## 2023-08-29 DIAGNOSIS — M70.52 PES ANSERINUS BURSITIS OF LEFT KNEE: ICD-10-CM

## 2023-08-29 PROCEDURE — 1159F PR MEDICATION LIST DOCUMENTED IN MEDICAL RECORD: ICD-10-PCS | Mod: S$GLB,,, | Performed by: ORTHOPAEDIC SURGERY

## 2023-08-29 PROCEDURE — 99214 PR OFFICE/OUTPT VISIT, EST, LEVL IV, 30-39 MIN: ICD-10-PCS | Mod: 25,S$GLB,, | Performed by: ORTHOPAEDIC SURGERY

## 2023-08-29 PROCEDURE — 3008F BODY MASS INDEX DOCD: CPT | Mod: S$GLB,,, | Performed by: ORTHOPAEDIC SURGERY

## 2023-08-29 PROCEDURE — 99999 PR PBB SHADOW E&M-EST. PATIENT-LVL III: ICD-10-PCS | Mod: PBBFAC,,, | Performed by: ORTHOPAEDIC SURGERY

## 2023-08-29 PROCEDURE — 4010F PR ACE/ARB THEARPY RXD/TAKEN: ICD-10-PCS | Mod: S$GLB,,, | Performed by: ORTHOPAEDIC SURGERY

## 2023-08-29 PROCEDURE — 3061F NEG MICROALBUMINURIA REV: CPT | Mod: S$GLB,,, | Performed by: ORTHOPAEDIC SURGERY

## 2023-08-29 PROCEDURE — 4010F ACE/ARB THERAPY RXD/TAKEN: CPT | Mod: S$GLB,,, | Performed by: ORTHOPAEDIC SURGERY

## 2023-08-29 PROCEDURE — 3061F PR NEG MICROALBUMINURIA RESULT DOCUMENTED/REVIEW: ICD-10-PCS | Mod: S$GLB,,, | Performed by: ORTHOPAEDIC SURGERY

## 2023-08-29 PROCEDURE — 99214 OFFICE O/P EST MOD 30 MIN: CPT | Mod: 25,S$GLB,, | Performed by: ORTHOPAEDIC SURGERY

## 2023-08-29 PROCEDURE — 3044F PR MOST RECENT HEMOGLOBIN A1C LEVEL <7.0%: ICD-10-PCS | Mod: S$GLB,,, | Performed by: ORTHOPAEDIC SURGERY

## 2023-08-29 PROCEDURE — 3066F PR DOCUMENTATION OF TREATMENT FOR NEPHROPATHY: ICD-10-PCS | Mod: S$GLB,,, | Performed by: ORTHOPAEDIC SURGERY

## 2023-08-29 PROCEDURE — 3008F PR BODY MASS INDEX (BMI) DOCUMENTED: ICD-10-PCS | Mod: S$GLB,,, | Performed by: ORTHOPAEDIC SURGERY

## 2023-08-29 PROCEDURE — 20610 DRAIN/INJ JOINT/BURSA W/O US: CPT | Mod: LT,S$GLB,, | Performed by: ORTHOPAEDIC SURGERY

## 2023-08-29 PROCEDURE — 99999 PR PBB SHADOW E&M-EST. PATIENT-LVL III: CPT | Mod: PBBFAC,,, | Performed by: ORTHOPAEDIC SURGERY

## 2023-08-29 PROCEDURE — 3044F HG A1C LEVEL LT 7.0%: CPT | Mod: S$GLB,,, | Performed by: ORTHOPAEDIC SURGERY

## 2023-08-29 PROCEDURE — 20610 LARGE JOINT ASPIRATION/INJECTION: L ANSERINE BURSA: ICD-10-PCS | Mod: LT,S$GLB,, | Performed by: ORTHOPAEDIC SURGERY

## 2023-08-29 PROCEDURE — 1159F MED LIST DOCD IN RCRD: CPT | Mod: S$GLB,,, | Performed by: ORTHOPAEDIC SURGERY

## 2023-08-29 PROCEDURE — 3066F NEPHROPATHY DOC TX: CPT | Mod: S$GLB,,, | Performed by: ORTHOPAEDIC SURGERY

## 2023-08-29 RX ORDER — TRIAMCINOLONE ACETONIDE 40 MG/ML
40 INJECTION, SUSPENSION INTRA-ARTICULAR; INTRAMUSCULAR
Status: DISCONTINUED | OUTPATIENT
Start: 2023-08-29 | End: 2023-08-29 | Stop reason: HOSPADM

## 2023-08-29 RX ADMIN — TRIAMCINOLONE ACETONIDE 40 MG: 40 INJECTION, SUSPENSION INTRA-ARTICULAR; INTRAMUSCULAR at 10:08

## 2023-08-29 NOTE — PROCEDURES
Large Joint Aspiration/Injection: L knee    Date/Time: 8/29/2023 10:00 AM    Performed by: Ha Chawla DO  Authorized by: Ha Chawla, DO    Consent Done?:  Yes (Verbal)  Indications:  Diagnostic evaluation and pain  Site marked: the procedure site was marked    Timeout: prior to procedure the correct patient, procedure, and site was verified    Prep: patient was prepped and draped in usual sterile fashion      Details:  Needle Size:  22 G  Ultrasonic Guidance for needle placement?: No    Location:  Knee  Site:  L knee  Medications:  40 mg triamcinolone acetonide 40 mg/mL  Patient tolerance:  Patient tolerated the procedure well with no immediate complications

## 2023-08-29 NOTE — PROGRESS NOTES
Subjective:      Patient ID: Zunilda Alvarez is a 58 y.o. female.    Chief Complaint: Pain of the Left Knee      HPI:  Ms. Alvarez returns today with complaints of recurrent pain in her left knee.  She stated she was on vacation in June and when she went to turn to stop a toddler from running through a door she banged her knee and twisted it.  She has had pain and swelling in her knee since.  Walking or bending her knee increases her symptoms while rest improves them.  As the day goes on the more she uses her knee if progressively swells.  She has taken NSAIDs with help.  She has worn a brace which has helped.  She has not had a recent injections.  She has been doing home exercises which are helping.  She has also been wearing arch supports in her shoes.  She had an arthroscopy her left knee in March of this year and was doing good until this most recent exacerbation.    ROS:  No new diagnosis/surgery/prescriptions since last office visit on 04/27/2023.  Constitutional: Negative for chills and fever.   HENT:  Negative for congestion.    Eyes:  Negative for blurred vision and double vision.   Cardiovascular:  Negative for chest pain and cyanosis.   Respiratory:  Negative for cough and shortness of breath.    Endocrine: Negative for cold intolerance and polydipsia.   Hematologic/Lymphatic: Negative for adenopathy.   Skin:  Negative for flushing, itching and rash.   Musculoskeletal:  Positive for joint pain. Negative for gout.   Gastrointestinal:  Negative for constipation, diarrhea and heartburn.   Genitourinary:  Negative for nocturia.   Neurological:  Negative for headaches and seizures.   Psychiatric/Behavioral:  Negative for depression and substance abuse. The patient is not nervous/anxious.    Allergic/Immunologic: Positive for environmental allergies.       Objective:      Physical Exam:   General: AAOx3.  No acute distress  Vascular:  Pulses intact and equal bilaterally.  Capillary refill less than 3 seconds  and equal bilaterally  Neurologic:  Pinprick and soft touch intact and equal bilaterally  Integment:  No ecchymosis, no errythema  Extremity:  Knee:  Extension/flexion right knee-1/118 degrees, left knee 1/112°.  Mild effusion left knee.  Positive drop home left knee.  Baker cyst, left knee.  Crepitus with motion both knees.  Negative patellar load/compression bilaterally.  Negative patella apprehension/relocation bilaterally.  Varus/valgus stressing with mild increased excursion with valgus stressing varus with good endpoint.  Lachman's/drawer equal bilaterally with endpoint.  Positive joint line tenderness left knee.  Sharron mildly positive left knee.  Nueces positive left knee.  Tender at the anserine insertion left knee.  Mild swelling at the anserine insertion left knee.  Radiography:  No new x-rays done today.      Assessment:       Impression:     1. Acute medial meniscus tear, left, subsequent encounter    2. Pes anserinus bursitis of left knee    3. Primary osteoarthritis of left knee    4. Baker cyst, left    5. Acute pain of left knee          Plan:       1.  Discussed physical examination and radiographic findings with the patient. Zunilda understands that she has a recurrence medial meniscus tear of her left knee along with a pes anserine bursitis and some arthritis.  Treatment alternatives and outcomes were discussed with the patient she understands she could continue to be treated conservatively with observation, activity modification, NSAIDs, bracing, physical therapy, injections, or she could consider surgical intervention such as arthroscopy.  Recommend she trial conservative management a little bit longer and if she fails further conservative care then consider surgical intervention.    2. Offered a steroid injection to the left knee, she elected to proceed.  3. Offered a steroid injection to the anserine insertion of the left knee, she elected to proceed.  4. Take NSAIDs as tolerated allowed  by PCM.    5. Wear brace as tolerated.    6. Continue doing home exercises such as quadriceps and hamstring strengthening.    7. Follow up in 1 month for re-evaluation if she has not improved may discuss possible proceeding with surgery and order an MRI at that time.  X-ray left knee

## 2023-08-29 NOTE — PROCEDURES
Large Joint Aspiration/Injection: L anserine bursa    Date/Time: 8/29/2023 10:00 AM    Performed by: Ha Chawla DO  Authorized by: Ha Chawla, DO    Consent Done?:  Yes (Verbal)  Indications:  Diagnostic evaluation and pain  Site marked: the procedure site was marked    Timeout: prior to procedure the correct patient, procedure, and site was verified    Prep: patient was prepped and draped in usual sterile fashion      Details:  Needle Size:  22 G  Ultrasonic Guidance for needle placement?: No    Approach:  Anteromedial  Location:  Knee  Site:  L anserine bursa  Medications:  40 mg triamcinolone acetonide 40 mg/mL  Patient tolerance:  Patient tolerated the procedure well with no immediate complications

## 2023-09-25 DIAGNOSIS — M25.562 LEFT KNEE PAIN, UNSPECIFIED CHRONICITY: Primary | ICD-10-CM

## 2023-09-26 ENCOUNTER — OFFICE VISIT (OUTPATIENT)
Dept: GASTROENTEROLOGY | Facility: CLINIC | Age: 58
End: 2023-09-26
Payer: COMMERCIAL

## 2023-09-26 VITALS
SYSTOLIC BLOOD PRESSURE: 126 MMHG | HEART RATE: 73 BPM | BODY MASS INDEX: 39.03 KG/M2 | DIASTOLIC BLOOD PRESSURE: 73 MMHG | WEIGHT: 228.63 LBS | HEIGHT: 64 IN

## 2023-09-26 DIAGNOSIS — Z51.81 ENCOUNTER FOR MONITORING LONG-TERM PROTON PUMP INHIBITOR THERAPY: ICD-10-CM

## 2023-09-26 DIAGNOSIS — Z80.0 FAMILY HISTORY OF COLON CANCER: ICD-10-CM

## 2023-09-26 DIAGNOSIS — R12 HEARTBURN: ICD-10-CM

## 2023-09-26 DIAGNOSIS — K21.00 GASTROESOPHAGEAL REFLUX DISEASE WITH ESOPHAGITIS WITHOUT HEMORRHAGE: Primary | ICD-10-CM

## 2023-09-26 DIAGNOSIS — K76.89 LIVER CYST: ICD-10-CM

## 2023-09-26 DIAGNOSIS — Z79.899 ENCOUNTER FOR MONITORING LONG-TERM PROTON PUMP INHIBITOR THERAPY: ICD-10-CM

## 2023-09-26 DIAGNOSIS — K76.0 FATTY LIVER: ICD-10-CM

## 2023-09-26 DIAGNOSIS — R12 WATERBRASH: ICD-10-CM

## 2023-09-26 PROCEDURE — 99214 PR OFFICE/OUTPT VISIT, EST, LEVL IV, 30-39 MIN: ICD-10-PCS | Mod: S$GLB,,,

## 2023-09-26 PROCEDURE — 3051F PR MOST RECENT HEMOGLOBIN A1C LEVEL 7.0 - < 8.0%: ICD-10-PCS | Mod: CPTII,S$GLB,,

## 2023-09-26 PROCEDURE — 4010F ACE/ARB THERAPY RXD/TAKEN: CPT | Mod: CPTII,S$GLB,,

## 2023-09-26 PROCEDURE — 3066F PR DOCUMENTATION OF TREATMENT FOR NEPHROPATHY: ICD-10-PCS | Mod: CPTII,S$GLB,,

## 2023-09-26 PROCEDURE — 3008F BODY MASS INDEX DOCD: CPT | Mod: CPTII,S$GLB,,

## 2023-09-26 PROCEDURE — 4010F PR ACE/ARB THEARPY RXD/TAKEN: ICD-10-PCS | Mod: CPTII,S$GLB,,

## 2023-09-26 PROCEDURE — 3074F PR MOST RECENT SYSTOLIC BLOOD PRESSURE < 130 MM HG: ICD-10-PCS | Mod: CPTII,S$GLB,,

## 2023-09-26 PROCEDURE — 1160F RVW MEDS BY RX/DR IN RCRD: CPT | Mod: CPTII,S$GLB,,

## 2023-09-26 PROCEDURE — 3078F DIAST BP <80 MM HG: CPT | Mod: CPTII,S$GLB,,

## 2023-09-26 PROCEDURE — 3078F PR MOST RECENT DIASTOLIC BLOOD PRESSURE < 80 MM HG: ICD-10-PCS | Mod: CPTII,S$GLB,,

## 2023-09-26 PROCEDURE — 3061F NEG MICROALBUMINURIA REV: CPT | Mod: CPTII,S$GLB,,

## 2023-09-26 PROCEDURE — 3051F HG A1C>EQUAL 7.0%<8.0%: CPT | Mod: CPTII,S$GLB,,

## 2023-09-26 PROCEDURE — 1159F MED LIST DOCD IN RCRD: CPT | Mod: CPTII,S$GLB,,

## 2023-09-26 PROCEDURE — 3074F SYST BP LT 130 MM HG: CPT | Mod: CPTII,S$GLB,,

## 2023-09-26 PROCEDURE — 1159F PR MEDICATION LIST DOCUMENTED IN MEDICAL RECORD: ICD-10-PCS | Mod: CPTII,S$GLB,,

## 2023-09-26 PROCEDURE — 99214 OFFICE O/P EST MOD 30 MIN: CPT | Mod: S$GLB,,,

## 2023-09-26 PROCEDURE — 3061F PR NEG MICROALBUMINURIA RESULT DOCUMENTED/REVIEW: ICD-10-PCS | Mod: CPTII,S$GLB,,

## 2023-09-26 PROCEDURE — 3066F NEPHROPATHY DOC TX: CPT | Mod: CPTII,S$GLB,,

## 2023-09-26 PROCEDURE — 99999 PR PBB SHADOW E&M-EST. PATIENT-LVL V: ICD-10-PCS | Mod: PBBFAC,,,

## 2023-09-26 PROCEDURE — 3008F PR BODY MASS INDEX (BMI) DOCUMENTED: ICD-10-PCS | Mod: CPTII,S$GLB,,

## 2023-09-26 PROCEDURE — 99999 PR PBB SHADOW E&M-EST. PATIENT-LVL V: CPT | Mod: PBBFAC,,,

## 2023-09-26 PROCEDURE — 1160F PR REVIEW ALL MEDS BY PRESCRIBER/CLIN PHARMACIST DOCUMENTED: ICD-10-PCS | Mod: CPTII,S$GLB,,

## 2023-09-26 RX ORDER — OMEPRAZOLE 40 MG/1
CAPSULE, DELAYED RELEASE ORAL
Qty: 90 CAPSULE | Refills: 0 | Status: SHIPPED | OUTPATIENT
Start: 2023-09-26 | End: 2023-12-25

## 2023-09-26 NOTE — PROGRESS NOTES
Subjective:       Patient ID: Zunilda Alvarez is a 58 y.o. female Body mass index is 39.24 kg/m².    Chief Complaint: Gastroesophageal Reflux    This patient is new to me.  Referring Provider: Dr. Ivory Pacheco III for GERD.  Established patient of Dr. Rice GI.           Gastroesophageal Reflux  She complains of belching (Reports in the last week has had more belching throughout the day), heartburn and water brash. She reports no abdominal pain, no chest pain, no choking, no coughing, no dysphagia, no early satiety, no globus sensation, no hoarse voice or no nausea. This is a chronic problem. The problem occurs occasionally. The heartburn is located in the substernum. The heartburn wakes (occasionally) her from sleep. The heartburn does not limit her activity. The heartburn doesn't change with position. The symptoms are aggravated by certain foods and caffeine. Pertinent negatives include no anemia, fatigue, melena, muscle weakness, orthopnea or weight loss. Procedure Date: 9/10/2019   Attending MD: Carter Rice MD   Procedure:  Upper GI endoscopy  Impression:   - LA Grade A reflux esophagitis.                         - Chronic gastritis. Biopsied.                         - Normal duodenal bulb, first portion of the                         duodenum and second portion of the duodenum.   Recommendation:       - Discharge patient to home (ambulatory).                         - Resume previous diet.   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 - Helicobacter pylori are not identified on routine staining. - No evidence of intestinal metaplasia, dysplasia or malignancy.   -patient's last colonoscopy was in 2019 with Dr. Rice, patient denies personal history of colon polyps and colon cancer, admits to family history of colon cancer in mother diagnosed in her 50s in maternal uncle  -patient denies changes in bowel habits and  hematochezia states has a daily bowel movement, rates stool type 4 on [place] stool scale, feels empty denies straining. Risk factors include caffeine use and NSAIDs (uses NSAIDS once a week for knee pain). She has tried a PPI and a histamine-2 antagonist (omeprazole many years started on tagament 2019 after last EGD) for the symptoms. The treatment provided significant relief. Past procedures do not include an abdominal ultrasound, an EGD, esophageal manometry, esophageal pH monitoring, H. pylori antibody titer or a UGI. Past invasive treatments do not include gastroplasty, gastroplication or reflux surgery.     Review of Systems   Constitutional:  Negative for fatigue and weight loss.   HENT:  Negative for hoarse voice.    Respiratory:  Negative for cough and choking.    Cardiovascular:  Negative for chest pain.   Gastrointestinal:  Positive for heartburn. Negative for abdominal pain, dysphagia, melena and nausea.   Musculoskeletal:  Negative for muscle weakness.         No LMP recorded. Patient has had a hysterectomy.  Past Medical History:   Diagnosis Date    Adrenal nodule 09/30/2019    Arthritis     Chondromalacia, left knee 03/02/2023    Depression     Diabetes mellitus type 2, insulin dependent     Diarrhea 09/20/2019    Family history of colon cancer 08/07/2019    Fatty liver 10/13/2020    Gastroesophageal reflux disease 09/10/2019    High cholesterol     Hypertension     Liver cyst 11/04/2021    Palpitations 05/05/2023    Sleep apnea     Sprain of medial collateral ligament of left knee 12/13/2022    Tear of meniscus of left knee 01/2023     Past Surgical History:   Procedure Laterality Date    ARTHROSCOPY OF KNEE Left 03/02/2023    Procedure: ARTHROSCOPY, KNEE;  Surgeon: Ha Chawla DO;  Location: [facility] OR;  Service: Orthopedics;  Laterality: Left;    ARTHROSCOPY OF KNEE WITH ARTHROPLASTY OF PATELLOFEMORAL JOINT Left 03/02/2023    Procedure: ARTHROSCOPY, KNEE, WITH PATELLOFEMORAL;  Surgeon: Ha GORDILLO  DO [place];  Location: [facility] OR;  Service: Orthopedics;  Laterality: Left;  Chondroplasty    COLONOSCOPY N/A 09/10/2019    Procedure: COLONOSCOPY;  Surgeon: Carter Rice MD;  Location: [facility] ENDO;  Service: Endoscopy;  Laterality: N/A;    ESOPHAGOGASTRODUODENOSCOPY N/A 09/10/2019    Procedure: EGD (ESOPHAGOGASTRODUODENOSCOPY);  Surgeon: Carter Rice MD;  Location: [facility] ENDO;  Service: Endoscopy;  Laterality: N/A;    HERNIA REPAIR      HYSTERECTOMY  1998    KNEE ARTHROSCOPY W/ MENISCECTOMY Left 03/02/2023    Procedure: ARTHROSCOPY, KNEE, WITH MENISCECTOMY;  Surgeon: Ha Chawla DO;  Location: [facility] OR;  Service: Orthopedics;  Laterality: Left;  PARTIAL    KNEE ARTHROSCOPY W/ PLICA EXCISION Left 03/02/2023    Procedure: EXCISION, PLICA, KNEE, ARTHROSCOPIC;  Surgeon: Ha Chawla DO;  Location: [facility] OR;  Service: Orthopedics;  Laterality: Left;    UPPER GASTROINTESTINAL ENDOSCOPY       Family History   Problem Relation Age of Onset    COPD Mother     Diabetes Mother     Hypertension Mother     Colon cancer Mother     Dementia Mother     Heart disease Father     Hypertension Father     COPD Father     Colon cancer Maternal Uncle     Breast cancer Daughter     Ovarian cancer Neg Hx     Crohn's disease Neg Hx     Esophageal cancer Neg Hx     Liver cancer Neg Hx     Stomach cancer Neg Hx     Rectal cancer Neg Hx     Ulcerative colitis Neg Hx      Social History     Tobacco Use    Smoking status: Never     Passive exposure: Past    Smokeless tobacco: Never   Substance Use Topics    Alcohol use: Yes     Comment: rarely    Drug use: No     Wt Readings from Last 10 Encounters:   09/26/23 103.7 kg (228 lb 9.9 oz)   09/19/23 104.8 kg (231 lb)   08/29/23 103.4 kg (228 lb)   05/24/23 103.4 kg (228 lb)   04/27/23 104.8 kg (231 lb 0.7 oz)   03/14/23 104.8 kg (231 lb 0.7 oz)   03/02/23 104.8 kg (231 lb)   02/16/23 104.8 kg (231 lb)   02/03/23 106.1 kg (233 lb 14.5 oz)   01/24/23 106.1 kg (233 lb 14.5 oz)     Lab Results  "  Component Value Date    WBC 9.7 09/13/2023    HGB 13.5 09/13/2023    HCT 40.1 09/13/2023    MCV 93.7 09/13/2023     09/13/2023     CMP  Sodium   Date Value Ref Range Status   09/13/2023 139 135 - 146 mmol/L Final     Potassium   Date Value Ref Range Status   09/13/2023 4.5 3.5 - 5.3 mmol/L Final     Chloride   Date Value Ref Range Status   09/13/2023 101 98 - 110 mmol/L Final     CO2   Date Value Ref Range Status   09/13/2023 23 20 - 32 mmol/L Final     Glucose   Date Value Ref Range Status   09/13/2023 133 65 - 139 mg/dL Final     Comment:               Non-fasting reference interval          BUN   Date Value Ref Range Status   09/13/2023 19 7 - 25 mg/dL Final     Creatinine   Date Value Ref Range Status   09/13/2023 0.56 0.50 - 1.03 mg/dL Final     Calcium   Date Value Ref Range Status   09/13/2023 9.3 8.6 - 10.4 mg/dL Final     Total Protein   Date Value Ref Range Status   09/13/2023 6.7 6.1 - 8.1 g/dL Final     Albumin   Date Value Ref Range Status   09/13/2023 4.2 3.6 - 5.1 g/dL Final     Total Bilirubin   Date Value Ref Range Status   09/13/2023 0.4 0.2 - 1.2 mg/dL Final     Alkaline Phosphatase   Date Value Ref Range Status   05/07/2020 69 37 - 153 U/L Final     AST   Date Value Ref Range Status   09/13/2023 14 10 - 35 U/L Final     ALT   Date Value Ref Range Status   09/13/2023 18 6 - 29 U/L Final     Anion Gap   Date Value Ref Range Status   02/13/2023 12 8 - 16 mmol/L Final     eGFR if    Date Value Ref Range Status   05/10/2022 116 > OR = 60 mL/min/1.73m2 Final     eGFR if non    Date Value Ref Range Status   05/10/2022 100 > OR = 60 mL/min/1.73m2 Final     No results found for: "LIPASE"  No results found for: "LIPASERES"  Lab Results   Component Value Date    TSH 2.22 09/13/2023       Reviewed prior medical records including radiology report of CT abdomen 11/17/2021, ultrasound abdomen 11/04/2021 & endoscopy history (see surgical history/procedures).    Objective: "      Physical Exam    Assessment:       1. Gastroesophageal reflux disease with esophagitis without hemorrhage    2. Heartburn    3. Waterbrash    4. Fatty liver    5. Encounter for monitoring long-term proton pump inhibitor therapy    6. Liver cyst    7. Family history of colon cancer        Plan:       Gastroesophageal reflux disease with esophagitis without hemorrhage, Heartburn, Waterbrash  -   START  omeprazole (PRILOSEC) 40 MG capsule; Take 1 capsule (40 mg total) by mouth 2 (two) times daily before meals for 30 days, THEN 1 capsule (40 mg total) once daily.  Dispense: 90 capsule; Refill: 0  -discussed about the different types of medications used to treat reflux and how to use them, antacids can be used PRN for breakthrough heartburn symptoms by reducing stomach acid that is already produced, H2 blockers work by limiting the amount acid production, & PPI's work to block acid production and are taken daily, patient verbalized understanding.  -Educated patient on lifestyle modifications to help control/reduce reflux/abdominal pain including: avoid large meals, avoid eating within 2-3 hours of bedtime (avoid late night eating & lying down soon after eating), elevate head of bed if nocturnal symptoms are present, smoking cessation (if current smoker), & weight loss (if overweight).   -Educated to avoid known foods which trigger reflux symptoms & to minimize/avoid high-fat foods, chocolate, caffeine, citrus, alcohol, & tomato products.  -Advised to avoid/limit use of NSAID's, since they can cause GI upset, bleeding, and/or ulcers. If needed, take with food.   - Consider repeat EGD    Family history of colon cancer   -screening colonoscopy due 09/2024    Fatty liver   -For fatty liver recommend: low fat, low cholesterol diet, maintain good control of blood sugars and cholesterol levels, exercise, weight loss (if overweight), minimize/avoid alcohol and tylenol products, & follow-up with PCP for continued evaluation  and management; if specialist is needed, recommend seeing hepatology.    Encounter for monitoring long-term proton pump inhibitor therapy  -     VITAMIN B12; Future; Expected date: 09/26/2023   -Option of tapering/weaning PPI away was also discussed, including the need for possible long term therapy to treat symptoms if they recur after cessation of medication, as well as to mitigate the risk of developing complications of reflux such as Jensen's esophagus and/or esophageal cancer.  Patient understands the risks and benefits of treatment with drug versus cessation.  Daily supplementation with MVI with calcium and vitamin D were recommended as was follow up with PCP for bone scan when appropriate.  Labs including B12, folate, CMP, CBC, calcium, and magnesium should be checked at least annually    Liver cyst  -     US Abdomen Complete; Future; Expected date: 09/26/2023  -     Ambulatory referral/consult to Hepatology; Future; Expected date: 10/03/2023        Follow up in about 4 weeks (around 10/24/2023).      If no improvement in symptoms or symptoms worsen, call/follow-up at clinic or go to ER.       [facility] - GASTROENTEROLOGY  [facility], [place]     Dictation software program was used for this note. Please expect some simple typographical  errors in this note.    Encounter includes face to face time and non-face to face time preparing to see the patient (eg, review of tests), obtaining and/or reviewing separately obtained history, documenting clinical information in the electronic or other health record, independently interpreting results (not separately reported) and communicating results to the patient/family/caregiver, or care coordination (not separately reported).

## 2023-09-27 ENCOUNTER — OFFICE VISIT (OUTPATIENT)
Dept: ORTHOPEDICS | Facility: CLINIC | Age: 58
End: 2023-09-27
Payer: COMMERCIAL

## 2023-09-27 ENCOUNTER — HOSPITAL ENCOUNTER (OUTPATIENT)
Dept: RADIOLOGY | Facility: HOSPITAL | Age: 58
Discharge: HOME | End: 2023-09-27
Attending: ORTHOPAEDIC SURGERY
Payer: COMMERCIAL

## 2023-09-27 VITALS — OXYGEN SATURATION: 96 % | BODY MASS INDEX: 38.93 KG/M2 | WEIGHT: 228 LBS | HEART RATE: 76 BPM | HEIGHT: 64 IN

## 2023-09-27 DIAGNOSIS — M17.12 PRIMARY OSTEOARTHRITIS OF LEFT KNEE: ICD-10-CM

## 2023-09-27 DIAGNOSIS — M70.52 PES ANSERINUS BURSITIS OF LEFT KNEE: ICD-10-CM

## 2023-09-27 DIAGNOSIS — M25.562 LEFT KNEE PAIN, UNSPECIFIED CHRONICITY: ICD-10-CM

## 2023-09-27 DIAGNOSIS — S83.242D ACUTE MEDIAL MENISCUS TEAR, LEFT, SUBSEQUENT ENCOUNTER: Primary | ICD-10-CM

## 2023-09-27 DIAGNOSIS — Z48.02 VISIT FOR SUTURE REMOVAL: ICD-10-CM

## 2023-09-27 DIAGNOSIS — M25.562 ACUTE PAIN OF LEFT KNEE: ICD-10-CM

## 2023-09-27 PROCEDURE — 4010F ACE/ARB THERAPY RXD/TAKEN: CPT | Mod: S$GLB,,, | Performed by: ORTHOPAEDIC SURGERY

## 2023-09-27 PROCEDURE — 1159F MED LIST DOCD IN RCRD: CPT | Mod: S$GLB,,, | Performed by: ORTHOPAEDIC SURGERY

## 2023-09-27 PROCEDURE — 99213 OFFICE O/P EST LOW 20 MIN: CPT | Mod: S$GLB,,, | Performed by: ORTHOPAEDIC SURGERY

## 2023-09-27 PROCEDURE — 73562 XR KNEE 3 VIEW LEFT: ICD-10-PCS | Mod: 26,LT,, | Performed by: RADIOLOGY

## 2023-09-27 PROCEDURE — 3008F PR BODY MASS INDEX (BMI) DOCUMENTED: ICD-10-PCS | Mod: S$GLB,,, | Performed by: ORTHOPAEDIC SURGERY

## 2023-09-27 PROCEDURE — 73562 X-RAY EXAM OF KNEE 3: CPT | Mod: TC,LT

## 2023-09-27 PROCEDURE — 1159F PR MEDICATION LIST DOCUMENTED IN MEDICAL RECORD: ICD-10-PCS | Mod: S$GLB,,, | Performed by: ORTHOPAEDIC SURGERY

## 2023-09-27 PROCEDURE — 4010F PR ACE/ARB THEARPY RXD/TAKEN: ICD-10-PCS | Mod: S$GLB,,, | Performed by: ORTHOPAEDIC SURGERY

## 2023-09-27 PROCEDURE — 99213 PR OFFICE/OUTPT VISIT, EST, LEVL III, 20-29 MIN: ICD-10-PCS | Mod: S$GLB,,, | Performed by: ORTHOPAEDIC SURGERY

## 2023-09-27 PROCEDURE — 3008F BODY MASS INDEX DOCD: CPT | Mod: S$GLB,,, | Performed by: ORTHOPAEDIC SURGERY

## 2023-09-27 PROCEDURE — 3051F HG A1C>EQUAL 7.0%<8.0%: CPT | Mod: S$GLB,,, | Performed by: ORTHOPAEDIC SURGERY

## 2023-09-27 PROCEDURE — 3051F PR MOST RECENT HEMOGLOBIN A1C LEVEL 7.0 - < 8.0%: ICD-10-PCS | Mod: S$GLB,,, | Performed by: ORTHOPAEDIC SURGERY

## 2023-09-27 PROCEDURE — 99999 PR PBB SHADOW E&M-EST. PATIENT-LVL III: CPT | Mod: PBBFAC,,, | Performed by: ORTHOPAEDIC SURGERY

## 2023-09-27 PROCEDURE — 3066F NEPHROPATHY DOC TX: CPT | Mod: S$GLB,,, | Performed by: ORTHOPAEDIC SURGERY

## 2023-09-27 PROCEDURE — 3061F NEG MICROALBUMINURIA REV: CPT | Mod: S$GLB,,, | Performed by: ORTHOPAEDIC SURGERY

## 2023-09-27 PROCEDURE — 3066F PR DOCUMENTATION OF TREATMENT FOR NEPHROPATHY: ICD-10-PCS | Mod: S$GLB,,, | Performed by: ORTHOPAEDIC SURGERY

## 2023-09-27 PROCEDURE — 73562 X-RAY EXAM OF KNEE 3: CPT | Mod: 26,LT,, | Performed by: RADIOLOGY

## 2023-09-27 PROCEDURE — 99999 PR PBB SHADOW E&M-EST. PATIENT-LVL III: ICD-10-PCS | Mod: PBBFAC,,, | Performed by: ORTHOPAEDIC SURGERY

## 2023-09-27 PROCEDURE — 3061F PR NEG MICROALBUMINURIA RESULT DOCUMENTED/REVIEW: ICD-10-PCS | Mod: S$GLB,,, | Performed by: ORTHOPAEDIC SURGERY

## 2023-09-27 NOTE — PROGRESS NOTES
Patient ID: Zunilda Alvarez is a 58 y.o. female.     Chief Complaint: Pain of the Left Knee        HPI:  Ms. Alvarez returned today  for reevaluation of her left knee.  At her last visit on 08/29/2023 she was seen for left knee pain which began when she was on vacation in June and when she went to turn to stop a toddler from running through a door she banged her knee into an air conditioning unit and twisted it.  She has had pain and swelling in her knee since.   She was given a steroid injection to her knee and pes insertion at her last visit which gave her some relief of symptoms but she still has pain.  She has not been using a brace.  Walking or bending her knee still increases her symptoms while rest improves them.  As the day goes on the more she uses her knee if progressively swells.  She has taken NSAIDs with help.  She has been doing home exercises which are helping.  She has also been wearing arch supports in her shoes.  She had an arthroscopy her left knee in March of this year and was doing good until this most recent exacerbation.     ROS:  No new diagnosis/surgery/prescriptions since last office visit on 08/29/2023.  Constitutional: Negative for chills and fever.   HENT:  Negative for congestion.    Eyes:  Negative for blurred vision and double vision.   Cardiovascular:  Negative for chest pain and cyanosis.   Respiratory:  Negative for cough and shortness of breath.    Endocrine: Negative for cold intolerance and polydipsia.   Hematologic/Lymphatic: Negative for adenopathy.   Skin:  Negative for flushing, itching and rash.   Musculoskeletal:  Positive for joint pain. Negative for gout.   Gastrointestinal:  Negative for constipation, diarrhea and heartburn.   Genitourinary:  Negative for nocturia.   Neurological:  Negative for headaches and seizures.   Psychiatric/Behavioral:  Negative for depression and substance abuse. The patient is not nervous/anxious.    Allergic/Immunologic: Positive for  environmental allergies.         Objective:      Physical Exam:   General: AAOx3.  No acute distress  Vascular:  Pulses intact and equal bilaterally.  Capillary refill less than 3 seconds and equal bilaterally  Neurologic:  Pinprick and soft touch intact and equal bilaterally  Integment:  No ecchymosis, no errythema  Extremity:  Knee:  Extension/flexion right knee-1/118 degrees, left knee 1/112°.  Mild effusion left knee.  Positive drop home left knee.  Baker cyst, left knee.  Crepitus with motion both knees.  Negative patellar load/compression bilaterally.  Negative patella apprehension/relocation bilaterally.  Varus/valgus stressing with mild increased excursion with valgus stressing varus with good endpoint.  Lachman's/drawer equal bilaterally with endpoint.  Positive joint line tenderness left knee.  Sharron mildly positive left knee.  [place] positive left knee.  Tender at the anserine insertion left knee.  Mild swelling at the anserine insertion left knee.  Radiography:   personally reviewed x-rays of the left knee completed on 09/27/2023 which showed mild tricompartmental arthritic changes with early osteophytes.      Assessment:      Impression:      1. Acute medial meniscus tear, left, subsequent encounter    2. Pes anserinus bursitis of left knee    3. Primary osteoarthritis of left knee    4. Baker cyst, left    5. Acute pain of left knee             Plan:       1.  Discussed physical examination and radiographic findings with the patient. Zunilda understands that she still has a recurrence medial meniscus tear of her left knee along with a pes anserine bursitis and some arthritis.  Treatment alternatives and outcomes were discussed with the patient she understands she could continue to be treated conservatively with observation, activity modification, NSAIDs, bracing, physical therapy, injections, or she could consider surgical intervention such as arthroscopy.   She states she would prefer not to  proceed with surgery and would like to trial conservative management a little bit longer.    2. Refer to physical therapy to start a meniscus tear rehab program along with treatment of the pes anserinus with Iono/phonophoresis.  3.  Brace wear was discussed.  4. Continue doing home exercises such as quadriceps and hamstring strengthening as previously discussed.    5. Continue using over-the-counter arch supports in his shoes.  6.  Continue using Voltaren gel on knee apply twice daily and massage for 2 minutes.    7.  Continue with NSAIDs as tolerated allowed by PCM.    8.  Follow up in 6 weeks

## 2023-09-29 ENCOUNTER — TELEPHONE (OUTPATIENT)
Dept: HEPATOLOGY | Facility: CLINIC | Age: 58
End: 2023-09-29
Payer: COMMERCIAL

## 2023-09-29 NOTE — TELEPHONE ENCOUNTER
Patient contacted to schedule appointment from referral.  An appointment has been scheduled with Dr. Spring on Tuesday, October 2, 2023 at 9:30AM.  Patient has agreed to appear.

## 2023-10-10 ENCOUNTER — HOSPITAL ENCOUNTER (OUTPATIENT)
Dept: RADIOLOGY | Facility: HOSPITAL | Age: 58
Discharge: HOME | End: 2023-10-10
Payer: COMMERCIAL

## 2023-10-10 DIAGNOSIS — K76.89 LIVER CYST: ICD-10-CM

## 2023-10-10 PROCEDURE — 76700 US EXAM ABDOM COMPLETE: CPT | Mod: 26,,, | Performed by: RADIOLOGY

## 2023-10-10 PROCEDURE — 76700 US ABDOMEN COMPLETE: ICD-10-PCS | Mod: 26,,, | Performed by: RADIOLOGY

## 2023-10-10 PROCEDURE — 76700 US EXAM ABDOM COMPLETE: CPT | Mod: TC

## 2023-10-11 ENCOUNTER — CLINICAL SUPPORT (OUTPATIENT)
Dept: REHABILITATION | Facility: HOSPITAL | Age: 58
End: 2023-10-11
Attending: ORTHOPAEDIC SURGERY
Payer: COMMERCIAL

## 2023-10-11 DIAGNOSIS — S83.242D ACUTE MEDIAL MENISCUS TEAR, LEFT, SUBSEQUENT ENCOUNTER: ICD-10-CM

## 2023-10-11 DIAGNOSIS — M17.12 PRIMARY OSTEOARTHRITIS OF LEFT KNEE: ICD-10-CM

## 2023-10-11 DIAGNOSIS — M70.52 PES ANSERINUS BURSITIS OF LEFT KNEE: ICD-10-CM

## 2023-10-11 PROCEDURE — 97161 PT EVAL LOW COMPLEX 20 MIN: CPT

## 2023-10-11 PROCEDURE — 97110 THERAPEUTIC EXERCISES: CPT

## 2023-10-11 NOTE — PLAN OF CARE
Physical Therapy Initial Evaluation     Name: Zunilda Alvarez  Clinic Number: [id number]    Zunilda is a 58 y.o. female evaluated on 10/11/2023.     Diagnosis:   Encounter Diagnoses   Name Primary?    Acute medial meniscus tear, left, subsequent encounter     Pes anserinus bursitis of left knee     Primary osteoarthritis of left knee      Physician: Ha Chawla DO  Treatment Orders: PT Eval and Treat    Past Medical History:   Diagnosis Date    Adrenal nodule 09/30/2019    Arthritis     Chondromalacia, left knee 03/02/2023    Depression     Diabetes mellitus type 2, insulin dependent     Diarrhea 09/20/2019    Family history of colon cancer 08/07/2019    Fatty liver 10/13/2020    Gastroesophageal reflux disease 09/10/2019    High cholesterol     Hypertension     Liver cyst 11/04/2021    Palpitations 05/05/2023    Sleep apnea     Sprain of medial collateral ligament of left knee 12/13/2022    Tear of meniscus of left knee 01/2023     Current Outpatient Medications   Medication Sig    aspirin (ECOTRIN) 81 MG EC tablet Take 81 mg by mouth once daily.    blood-glucose meter kit TID testing of blood sugar  Type II DM with long term use of insulin    cimetidine (TAGAMET) 400 MG tablet Take 1 tablet (400 mg total) by mouth nightly.    colestipoL (COLESTID) 1 gram Tab Take 1 tablet (1 g total) by mouth once daily.    dapagliflozin (FARXIGA) 10 mg tablet Take 1 tablet (10 mg total) by mouth once daily.    diclofenac sodium (VOLTAREN) 1 % Gel Apply 2 g topically 4 (four) times daily.    escitalopram oxalate (LEXAPRO) 10 MG tablet Take 20 mg by mouth once daily.    flash glucose sensor (FREESTYLE JOHNATHON 2 SENSOR) Kit APPLY 1 SENSOR (VIA WEARABLE INJECTOR) TO SKIN AS DIRECTED TO CHECK GLUCOSE. REPLACE EVERY 14 DAYS AS DIRECTED    insulin aspart U-100 (NOVOLOG FLEXPEN U-100 INSULIN) 100 unit/mL (3 mL) InPn pen INJECT 18 UNITS SUBCUTANEOUSLY THREE TIMES DAILY (Patient  taking differently: Inject into the skin 3 (three) times daily 10 units in the am, 15 at lunch and supper)    insulin glargine-yfgn 100 unit/mL (3 mL) InPn INJECT 70 UNITS SUBCUTANEOUSLY IN THE EVENING    lisinopriL 10 MG tablet Take 10 mg by mouth.    lisinopril-hydrochlorothiazide (PRINZIDE,ZESTORETIC) 10-12.5 mg per tablet Take 1 tablet by mouth once daily.    metFORMIN (GLUCOPHAGE) 1000 MG tablet TAKE 1 TABLET BY MOUTH TWICE DAILY WITH MEALS    metoprolol succinate (TOPROL-XL) 100 MG 24 hr tablet Take 100 mg by mouth 2 (two) times daily.    omeprazole (PRILOSEC) 40 MG capsule Take 1 capsule (40 mg total) by mouth 2 (two) times daily before meals for 30 days, THEN 1 capsule (40 mg total) once daily.    pravastatin (PRAVACHOL) 20 MG tablet Take 20 mg by mouth once daily.    semaglutide (OZEMPIC) 2 mg/dose (8 mg/3 mL) PnIj Inject 2 mg into the skin every 7 days.     No current facility-administered medications for this visit.     Review of patient's allergies indicates:  No Known Allergies  Precautions: Wear knee brace, except when showering and sleeping.        Subjective     Patient States: Pt reports she underwent a L knee arthroscopy in March, 2023. She states she received physical therapy and was doing well.  In June, 2023, she hit the incision of the left knee on the air conditioning unit in a hotel room while in [place].  She returned to Dr. Chawla and she received cortisone shots.  He told her to wear her knee brace and return to physical therapy.    Onset: sudden  Involved Area/Location: left  Current Symptoms: pain and swelling  Popping/clicking: denies  Lower extremity gives way: denies  Locking episodes: denies    Aggravating Factors: walking, ascending stairs, descending stairs  Relieving Factors: ice, heating pad, rest, and elevation    Diagnostic Tests: 0    Pain Scale: Zunilda rates pain on a scale of 0-10 to be 10 at worst; 4 currently; 1 at best .    Patient Goals: decrease  pain      Objective     Observation: Pt amb with a slight antalgic gait.    Posture: slouched posture    Range of Motion: Knee   Left Right   Flexion: 115 WNL   Extension -5 WNL     Strength: Knee   Left Right   Quadriceps 3+/5 5/5   Hamstrings 3+/5 5/5       Strength: Hip    Left Right   Iliopsoas 3+/5 5/5                  Ext 3+/5 5/5       Joint Mobility: hypomobile  Palpation: tender to touch  Sensation: impaired to light touch    Edema:  Left: absent  Right: absent    Gait: Kim ambulated with none.  Level of Assistance: independent  Patient displays antalgic gait.   Balance: Maintains SLS 1 seconds with fair balance strategies.    DTR's:   Left Right   Patella Tendon golden 2+   Achilles Tendon golden 2+       TREATMENT     Time In: 9:00  Time Out: 9:45    PT Evaluation Completed? Yes  Discussed Plan of Care with patient: Yes    Zunilda received 30 minutes of therapeutic exercise including:   Nustep Lvl 1 x 15 minutes  Quad sets x 15  Heelslides x 15    Zunilda received 0 minutes of manual therapy including:      Written Home Exercises Provided:   Zunilda demonstrated good understanding of the education provided. Patient demonstrated good return demonstration of skill of exercises.    ASSESSMENT   Pt prognosis is Good.  Pt will benefit from skilled outpatient physical therapy to address the above stated deficits, provide pt/family education and to maximize pt's level of independence.     Medical necessity is demonstrated by the following IMPAIRMENTS/PROBLEMS:  Increased pain  2.   Decreased strength  3.   Decreased balance  4.   Decreased endurance  5.   Decreased ROM      Pt's spiritual, cultural and educational needs considered and pt agreeable to plan of care and goals as stated below:     Anticipated Barriers for physical therapy:     Short Term GOALS: 3 weeks. Pt agrees with goals set.  1. Patient demonstrates independence with HEP.   2. Patient demonstrates knee flexion 120 degrees.  3. Patient demonstrates knee  extension -2 degrees.  4. Patient demonstrates left knee strength 4/5.    Long Term GOALS: 6  weeks. Pt agrees with goals set.  1. Patient demonstrates L knee flexion/extension WNL.  2. Patient demonstrates L knee strength 5/5.  3. Patient demonstrates disability to <10%.    Functional Limitations Reports - G Codes  Category: mobility  Tool: LEFS  Score: 57/80    TEST SCORE  Modifier  Impairment Limitation Restriction    80/80  CH  0 % impaired, limited or restricted   64-79/80  CI  @ least 1% but less than 20% impaired, limited or restricted   49-63/80  CJ  @ least 20%<40% impaired, limited or restricted   33-48/80  CK  @ least 40%<60% impaired, limited or restricted   17-32/80  CL  @ least 60% <80% impaired, limited or restricted   1-16/80  CM  @ least 80%<100% impaired limited or restricted   0/80  CN  100% impaired, limited or restricted       PLAN     Outpatient physical therapy 2 times weekly to include: pt ed, hep, therapeutic exercises, neuromuscular re-education/ balance exercises, joint mobilizations, aquatic therapy and modalities prn. Cont PT for  6 weeks. Pt may be seen by PTA as part of the rehabilitation team.     Therapist: Jodi Cardoso, PT    I certify the need for these services furnished under this plan of treatment and while under my care.  ____________________________________ Physician/Referring Practitioner   Date of Signature

## 2023-10-23 ENCOUNTER — CLINICAL SUPPORT (OUTPATIENT)
Dept: REHABILITATION | Facility: HOSPITAL | Age: 58
End: 2023-10-23
Payer: COMMERCIAL

## 2023-10-23 DIAGNOSIS — M70.52 PES ANSERINUS BURSITIS OF LEFT KNEE: Primary | ICD-10-CM

## 2023-10-23 PROCEDURE — 97110 THERAPEUTIC EXERCISES: CPT

## 2023-10-23 PROCEDURE — 97530 THERAPEUTIC ACTIVITIES: CPT

## 2023-10-23 NOTE — PROGRESS NOTES
Physical Therapy Daily Note     Name: Zunilda Alvarez  Clinic Number: [id number]  Diagnosis:   Encounter Diagnosis   Name Primary?    Pes anserinus bursitis of left knee Yes     Physician: Ha Chawla DO  PT eval date:  10/11/23  Ins. Authorization period:  10/1/23 - 3/22/24  Plan of care expires:  12/4/23  Visit date:  10/23/23  Precautions: Wear knee brace, except when showering and sleeping  Visit #: 2 of 12  Time In: 8:15  Time Out: 9:00    Subjective     Pt reports: she's been hurting all weekend.  Pain Scale: Zunilda rates pain on a scale of 0-10 to be 8 currently.    Objective     Zunilda received individual therapeutic exercises to develop strength, endurance, ROM, and flexibility for 30 minutes including:  Nustep Lvl 1 x 12 minutes  Quad sets x 15  Heelslides x 15  DKTC on SB x 15  SLR   x 15  Hip abd SL x 15  LAQ   x 15  Knee flex RTB x 15  Hamstring stretching x 10    Zunilda received individual therapeutic activities for 15 minutes to improve functional performance to BLE to include:  Step ups with blue pad x 15  Standing knee flex x 15  Standing hip abd x 15  Standing hip ext x 15  Heelcord stretching on wedge x 10      Zunilda received the following manual therapy techniques: Joint mobilizations and Soft tissue Mobilization were applied to the: left knee for 0 minutes including:       Written Home Exercises Provided: Yes  Quad sets, heelslides, LAQ's  Pt demo good understanding of the education provided. Zunilda demonstrated good return demonstration of activities.     Education provided re:  Zunilda verbalized good understanding of education provided.   No spiritual or educational barriers to learning provided    Assessment     Patient tolerated treatment well with no adverse effects.  This is a 58 y.o. female referred to outpatient physical therapy and presents with a medical diagnosis of acute medial meniscus tear on the left and demonstrates  limitations as described in the problem list. Pt prognosis is Good. Pt will continue to benefit from skilled outpatient physical therapy to address the deficits listed in the problem list, provide pt/family education and to maximize pt's level of independence in the home and community environment.     Goals as follows:  Short Term GOALS: 3 weeks. Pt agrees with goals set.  1. Patient demonstrates independence with HEP.   2. Patient demonstrates knee flexion 120 degrees.  3. Patient demonstrates knee extension -2 degrees.  4. Patient demonstrates left knee strength 4/5.     Long Term GOALS: 6  weeks. Pt agrees with goals set.  1. Patient demonstrates L knee flexion/extension WNL.  2. Patient demonstrates L knee strength 5/5.  3. Patient demonstrates disability to <10%.     Plan     Continue with established Plan of Care towards PT goals.    Therapist: Jodi Cardoso, PT  10/23/2023

## 2023-10-25 ENCOUNTER — CLINICAL SUPPORT (OUTPATIENT)
Dept: REHABILITATION | Facility: HOSPITAL | Age: 58
End: 2023-10-25
Payer: COMMERCIAL

## 2023-10-25 DIAGNOSIS — M70.52 PES ANSERINUS BURSITIS OF LEFT KNEE: Primary | ICD-10-CM

## 2023-10-25 PROCEDURE — 97110 THERAPEUTIC EXERCISES: CPT

## 2023-10-25 PROCEDURE — 97530 THERAPEUTIC ACTIVITIES: CPT

## 2023-10-25 NOTE — PROGRESS NOTES
"                                                    Physical Therapy Daily Note     Name: Zunilda Alvarez  Clinic Number: [id number]  Diagnosis:   Encounter Diagnosis   Name Primary?    Pes anserinus bursitis of left knee Yes     Physician: Ha Chawla,   PT eval date:  10/11/23  Ins. Authorization period:  10/1/23 - 3/22/24  Plan of care expires:  12/4/23  Visit date:  10/25/23  Precautions: Wear knee brace, except when showering and sleeping  Visit #: 3 of 12  Time In: 8:05  Time Out: 9:00    Subjective     Pt reports: she's feeling better and has been sleeping with her knee brace, which she reports is helping her.   Pain Scale: Zunilda rates pain on a scale of 0-10 to be 4 currently.    Objective     Zunilda received individual therapeutic exercises to develop strength, endurance, ROM, and flexibility for 38 minutes including:  Nustep Lvl 1 x 12 minutes  Quad sets x 20  Heelslides 2# wt x 20  DKTC on SB x 20  SLR 2# wt   x 20  Hip abd 2# wt SL x 20  LAQ 2# wt  x 20  Knee flex RTB x 20  Hamstring stretching 3 x 30"  Heelcord stretching 3 x 30"    Zunilda received individual therapeutic activities for 15 minutes to improve functional performance to BLE to include:  Step ups with blue pad x 20  Standing knee flex x 20  Standing hip abd x 20  Standing hip ext x 20  Mini-squats x 20  Wall walking x 3        Zunilda received the following manual therapy techniques: Joint mobilizations and Soft tissue Mobilization were applied to the: left knee for 0 minutes including:       Written Home Exercises Provided: Yes  Quad sets, heelslides, LAQ's  Pt demo good understanding of the education provided. Zunilda demonstrated good return demonstration of activities.     Education provided re:  Zunilda verbalized good understanding of education provided.   No spiritual or educational barriers to learning provided    Assessment     Patient tolerated treatment well with no adverse effects.  This is a 58 y.o. female referred to " outpatient physical therapy and presents with a medical diagnosis of acute medial meniscus tear on the left and demonstrates limitations as described in the problem list. Pt prognosis is Good. Pt will continue to benefit from skilled outpatient physical therapy to address the deficits listed in the problem list, provide pt/family education and to maximize pt's level of independence in the home and community environment.     Goals as follows:  Short Term GOALS: 3 weeks. Pt agrees with goals set.  1. Patient demonstrates independence with HEP.   2. Patient demonstrates knee flexion 120 degrees.  3. Patient demonstrates knee extension -2 degrees.  4. Patient demonstrates left knee strength 4/5.     Long Term GOALS: 6  weeks. Pt agrees with goals set.  1. Patient demonstrates L knee flexion/extension WNL.  2. Patient demonstrates L knee strength 5/5.  3. Patient demonstrates disability to <10%.     Plan     Continue with established Plan of Care towards PT goals.    Therapist: Jodi Cardoso, PT  10/25/2023

## 2023-11-08 NOTE — ANESTHESIA PREPROCEDURE EVALUATION
09/10/2019  Zunilda Alvarez is a 54 y.o., female.    Anesthesia Evaluation    I have reviewed the Patient Summary Reports.    I have reviewed the Nursing Notes.   I have reviewed the Medications.     Review of Systems  Social:  Non-Smoker    Hematology/Oncology:  Hematology Normal   Oncology Normal     EENT/Dental:EENT/Dental Normal   Cardiovascular:   Hypertension    Pulmonary:   Sleep Apnea    Renal/:  Renal/ Normal     Hepatic/GI:  Hepatic/GI Normal    Musculoskeletal:  Musculoskeletal Normal    Neurological:  Neurology Normal    Endocrine:   Diabetes    Dermatological:  Skin Normal    Psych:  Psychiatric Normal           Physical Exam  General:  Well nourished, Obesity    Airway/Jaw/Neck:  Airway Findings: Mouth Opening: Normal General Airway Assessment: Adult  Mallampati: III  TM Distance: Normal, at least 6 cm       Chest/Lungs:  Chest/Lungs Findings: Clear to auscultation     Heart/Vascular:  Heart Findings: Rate: Normal  Rhythm: Regular Rhythm        Mental Status:  Mental Status Findings:  Cooperative, Alert and Oriented         Anesthesia Plan  Type of Anesthesia, risks & benefits discussed:  Anesthesia Type:  general  Patient's Preference:   Intra-op Monitoring Plan: standard ASA monitors  Intra-op Monitoring Plan Comments:   Post Op Pain Control Plan: IV/PO Opioids PRN  Post Op Pain Control Plan Comments:   Induction:   IV  Beta Blocker:  Patient is not currently on a Beta-Blocker (No further documentation required).       Informed Consent: Patient understands risks and agrees with Anesthesia plan.  Questions answered. Anesthesia consent signed with patient.  ASA Score: 3     Day of Surgery Review of History & Physical: I have interviewed and examined the patient. I have reviewed the patient's H&P dated:            Ready For Surgery From Anesthesia Perspective.          November 8, 2023      [facility] [address] [address]  [address]  [facility] [address]  Phone: [phone]       Patient: Henna Gómez   YOB: 2008  Date of Visit: 11/08/2023    To Whom It May Concern:    Magali Gómez  was at [facility] on 11/08/2023. The patient may return to work/school on 11/8/23 with no restrictions. If you have any questions or concerns, or if I can be of further assistance, please do not hesitate to contact me.    Sincerely,    Marcia Baker RD

## 2023-11-15 ENCOUNTER — OFFICE VISIT (OUTPATIENT)
Dept: ORTHOPEDICS | Facility: CLINIC | Age: 58
End: 2023-11-15
Payer: COMMERCIAL

## 2023-11-15 VITALS — RESPIRATION RATE: 18 BRPM | WEIGHT: 228 LBS | BODY MASS INDEX: 38.93 KG/M2 | HEIGHT: 64 IN

## 2023-11-15 DIAGNOSIS — M17.12 PRIMARY OSTEOARTHRITIS OF LEFT KNEE: ICD-10-CM

## 2023-11-15 DIAGNOSIS — M71.22 BAKER CYST, LEFT: ICD-10-CM

## 2023-11-15 DIAGNOSIS — M25.562 ACUTE PAIN OF LEFT KNEE: ICD-10-CM

## 2023-11-15 DIAGNOSIS — M70.52 PES ANSERINUS BURSITIS OF LEFT KNEE: ICD-10-CM

## 2023-11-15 DIAGNOSIS — S83.242D ACUTE MEDIAL MENISCUS TEAR, LEFT, SUBSEQUENT ENCOUNTER: Primary | ICD-10-CM

## 2023-11-15 PROCEDURE — 99213 OFFICE O/P EST LOW 20 MIN: CPT | Mod: S$GLB,,, | Performed by: ORTHOPAEDIC SURGERY

## 2023-11-15 PROCEDURE — 99999 PR PBB SHADOW E&M-EST. PATIENT-LVL III: ICD-10-PCS | Mod: PBBFAC,,, | Performed by: ORTHOPAEDIC SURGERY

## 2023-11-15 PROCEDURE — 99999 PR PBB SHADOW E&M-EST. PATIENT-LVL III: CPT | Mod: PBBFAC,,, | Performed by: ORTHOPAEDIC SURGERY

## 2023-11-15 PROCEDURE — 99213 PR OFFICE/OUTPT VISIT, EST, LEVL III, 20-29 MIN: ICD-10-PCS | Mod: S$GLB,,, | Performed by: ORTHOPAEDIC SURGERY

## 2023-11-15 NOTE — PROGRESS NOTES
Patient ID: Zunilda Alvarez is a 58 y.o. female.     Chief Complaint: Pain of the Left Knee        HPI:  Ms. Alvarez returned today  for reevaluation of her left knee.  At her last visit she was forwarded to physical therapy which has not resolved her symptoms she has had steroid injections which have not helped.  She bought a hinged brace which does help.  She has had left knee pain which began when she was on vacation in June and when she went to turn to stop a toddler from running through a door she banged her knee into an air conditioning unit and twisted it.  She has had pain and swelling in her knee since.  Walking or bending her knee still increases her symptoms while rest improves them.  As the day goes on the more she uses her knee she gets progressively swelling.  She has taken NSAIDs with help.       ROS:  No new diagnosis/surgery/prescriptions since last office visit on 08/29/2023.  Constitutional: Negative for chills and fever.   HENT:  Negative for congestion.    Eyes:  Negative for blurred vision and double vision.   Cardiovascular:  Negative for chest pain and cyanosis.   Respiratory:  Negative for cough and shortness of breath.    Endocrine: Negative for cold intolerance and polydipsia.   Hematologic/Lymphatic: Negative for adenopathy.   Skin:  Negative for flushing, itching and rash.   Musculoskeletal:  Positive for joint pain. Negative for gout.   Gastrointestinal:  Negative for constipation, diarrhea and heartburn.   Genitourinary:  Negative for nocturia.   Neurological:  Negative for headaches and seizures.   Psychiatric/Behavioral:  Negative for depression and substance abuse. The patient is not nervous/anxious.    Allergic/Immunologic: Positive for environmental allergies.      Objective:     General: AAOx3.  No acute distress  Vascular:  Pulses intact and equal bilaterally.  Capillary refill less than 3 seconds and equal bilaterally  Neurologic:  Pinprick and soft touch intact and equal  bilaterally  Integment:  No ecchymosis, no errythema  Extremity:  Knee:  Extension/flexion right knee-1/118 degrees, left knee 1/112°.  Mild effusion left knee.  Positive drop home left knee.  Baker cyst, left knee.  Crepitus with motion both knees.  Negative patellar load/compression bilaterally.  Negative patella apprehension/relocation bilaterally.  Varus/valgus stressing with mild increased excursion with valgus stressing varus with good endpoint.  Lachman's/drawer equal bilaterally with endpoint.  Positive joint line tenderness left knee.  Sharron mildly positive left knee.  Beckham positive left knee.  Tender at the anserine insertion left knee.  Mild swelling at the anserine insertion left knee.  Radiography:   No new x-rays done today.     Assessment:      1. Acute medial meniscus tear, left, subsequent encounter    2. Pes anserinus bursitis of left knee    3. Primary osteoarthritis of left knee    4. Baker cyst, left    5. Acute pain of left knee        Plan:       1.  Discussed physical examination and with the patient. Zunilda understands that she still has a recurrence medial meniscus tear of her left knee along with a pes anserine bursitis and some arthritis.  Treatment alternatives and outcomes were discussed with the patient she understands she could continue to be treated conservatively with observation, activity modification, NSAIDs, bracing, physical therapy, injections, or she could consider surgical intervention such as arthroscopy.   At this point she has failed conservative management and the only other thing that can be offered is surgical intervention but before any surgery is offered an MRI is warranted.  2.  Hold physical therapy until after MRI is completed..  3.  Continue wearing brace.  4. Continue doing home exercises such as quadriceps and hamstring strengthening as previously discussed.    5. Continue using over-the-counter arch supports in his shoes.  6.  Continue using Voltaren  gel on knee apply twice daily and massage for 2 minutes.    7.  Continue with NSAIDs as tolerated allowed by PCM.    8. Refer for an MRI of the left knee   9. Final recommendations after MRI is completed.  10. Follow up after MRI is completed.

## 2023-11-20 ENCOUNTER — HOSPITAL ENCOUNTER (OUTPATIENT)
Dept: RADIOLOGY | Facility: HOSPITAL | Age: 58
Discharge: HOME | End: 2023-11-20
Attending: ORTHOPAEDIC SURGERY
Payer: COMMERCIAL

## 2023-11-20 DIAGNOSIS — S83.242D ACUTE MEDIAL MENISCUS TEAR, LEFT, SUBSEQUENT ENCOUNTER: ICD-10-CM

## 2023-11-20 PROCEDURE — 73721 MRI JNT OF LWR EXTRE W/O DYE: CPT | Mod: TC,LT

## 2023-11-20 PROCEDURE — 73721 MRI KNEE WITHOUT CONTRAST LEFT: ICD-10-PCS | Mod: 26,LT,, | Performed by: RADIOLOGY

## 2023-11-20 PROCEDURE — 73721 MRI JNT OF LWR EXTRE W/O DYE: CPT | Mod: 26,LT,, | Performed by: RADIOLOGY

## 2023-11-22 ENCOUNTER — HOSPITAL ENCOUNTER (EMERGENCY)
Facility: HOSPITAL | Age: 58
Discharge: HOME | End: 2023-11-22
Attending: EMERGENCY MEDICINE
Payer: COMMERCIAL

## 2023-11-22 ENCOUNTER — PATIENT MESSAGE (OUTPATIENT)
Dept: ORTHOPEDICS | Facility: CLINIC | Age: 58
End: 2023-11-22
Payer: COMMERCIAL

## 2023-11-22 VITALS
DIASTOLIC BLOOD PRESSURE: 67 MMHG | RESPIRATION RATE: 18 BRPM | TEMPERATURE: 98 F | HEIGHT: 64 IN | BODY MASS INDEX: 38.93 KG/M2 | WEIGHT: 228 LBS | OXYGEN SATURATION: 91 % | SYSTOLIC BLOOD PRESSURE: 113 MMHG | HEART RATE: 76 BPM

## 2023-11-22 DIAGNOSIS — N39.0 URINARY TRACT INFECTION WITHOUT HEMATURIA, SITE UNSPECIFIED: Primary | ICD-10-CM

## 2023-11-22 DIAGNOSIS — R10.9 RIGHT FLANK PAIN: ICD-10-CM

## 2023-11-22 LAB
ALBUMIN SERPL BCP-MCNC: 3.9 G/DL (ref 3.5–5.2)
ALP SERPL-CCNC: 70 U/L (ref 55–135)
ALT SERPL W/O P-5'-P-CCNC: 26 U/L (ref 10–44)
ANION GAP SERPL CALC-SCNC: 14 MMOL/L (ref 8–16)
AST SERPL-CCNC: 17 U/L (ref 10–40)
BACTERIA #/AREA URNS HPF: ABNORMAL /HPF
BASOPHILS # BLD AUTO: 0.04 K/UL (ref 0–0.2)
BASOPHILS NFR BLD: 0.6 % (ref 0–1.9)
BILIRUB SERPL-MCNC: 0.5 MG/DL (ref 0.1–1)
BILIRUB UR QL STRIP: NEGATIVE
BUN SERPL-MCNC: 13 MG/DL (ref 6–20)
CALCIUM SERPL-MCNC: 9.4 MG/DL (ref 8.7–10.5)
CHLORIDE SERPL-SCNC: 101 MMOL/L (ref 95–110)
CLARITY UR: CLEAR
CO2 SERPL-SCNC: 26 MMOL/L (ref 23–29)
COLOR UR: YELLOW
CREAT SERPL-MCNC: 0.8 MG/DL (ref 0.5–1.4)
DIFFERENTIAL METHOD: ABNORMAL
EOSINOPHIL # BLD AUTO: 0.1 K/UL (ref 0–0.5)
EOSINOPHIL NFR BLD: 1.9 % (ref 0–8)
ERYTHROCYTE [DISTWIDTH] IN BLOOD BY AUTOMATED COUNT: 13.6 % (ref 11.5–14.5)
EST. GFR  (NO RACE VARIABLE): >60 ML/MIN/1.73 M^2
GLUCOSE SERPL-MCNC: 245 MG/DL (ref 70–110)
GLUCOSE UR QL STRIP: ABNORMAL
HCT VFR BLD AUTO: 41.1 % (ref 37–48.5)
HGB BLD-MCNC: 13.5 G/DL (ref 12–16)
HGB UR QL STRIP: NEGATIVE
IMM GRANULOCYTES # BLD AUTO: 0.04 K/UL (ref 0–0.04)
IMM GRANULOCYTES NFR BLD AUTO: 0.6 % (ref 0–0.5)
KETONES UR QL STRIP: NEGATIVE
LACTATE SERPL-SCNC: 2.3 MMOL/L (ref 0.5–2.2)
LEUKOCYTE ESTERASE UR QL STRIP: NEGATIVE
LIPASE SERPL-CCNC: 23 U/L (ref 4–60)
LYMPHOCYTES # BLD AUTO: 1.7 K/UL (ref 1–4.8)
LYMPHOCYTES NFR BLD: 26.3 % (ref 18–48)
MCH RBC QN AUTO: 31.3 PG (ref 27–31)
MCHC RBC AUTO-ENTMCNC: 32.8 G/DL (ref 32–36)
MCV RBC AUTO: 95 FL (ref 82–98)
MICROSCOPIC COMMENT: ABNORMAL
MONOCYTES # BLD AUTO: 0.5 K/UL (ref 0.3–1)
MONOCYTES NFR BLD: 8.3 % (ref 4–15)
NEUTROPHILS # BLD AUTO: 3.9 K/UL (ref 1.8–7.7)
NEUTROPHILS NFR BLD: 62.3 % (ref 38–73)
NITRITE UR QL STRIP: NEGATIVE
NRBC BLD-RTO: 0 /100 WBC
PH UR STRIP: 6 [PH] (ref 5–8)
PLATELET # BLD AUTO: 241 K/UL (ref 150–450)
PMV BLD AUTO: 11.4 FL (ref 9.2–12.9)
POTASSIUM SERPL-SCNC: 3.9 MMOL/L (ref 3.5–5.1)
PROT SERPL-MCNC: 7.1 G/DL (ref 6–8.4)
PROT UR QL STRIP: NEGATIVE
RBC # BLD AUTO: 4.32 M/UL (ref 4–5.4)
RBC #/AREA URNS HPF: 3 /HPF (ref 0–4)
SODIUM SERPL-SCNC: 141 MMOL/L (ref 136–145)
SP GR UR STRIP: 1.01 (ref 1–1.03)
SQUAMOUS #/AREA URNS HPF: 5 /HPF
URN SPEC COLLECT METH UR: ABNORMAL
UROBILINOGEN UR STRIP-ACNC: 1 EU/DL
WBC # BLD AUTO: 6.28 K/UL (ref 3.9–12.7)
WBC #/AREA URNS HPF: 12 /HPF (ref 0–5)
YEAST URNS QL MICRO: ABNORMAL

## 2023-11-22 PROCEDURE — 93005 ELECTROCARDIOGRAM TRACING: CPT

## 2023-11-22 PROCEDURE — 74177 CT ABD & PELVIS W/CONTRAST: CPT | Mod: 26,,, | Performed by: RADIOLOGY

## 2023-11-22 PROCEDURE — 85025 COMPLETE CBC W/AUTO DIFF WBC: CPT | Performed by: EMERGENCY MEDICINE

## 2023-11-22 PROCEDURE — 74177 CT ABDOMEN PELVIS WITH IV CONTRAST: ICD-10-PCS | Mod: 26,,, | Performed by: RADIOLOGY

## 2023-11-22 PROCEDURE — 87086 URINE CULTURE/COLONY COUNT: CPT | Performed by: EMERGENCY MEDICINE

## 2023-11-22 PROCEDURE — 74177 CT ABD & PELVIS W/CONTRAST: CPT | Mod: TC

## 2023-11-22 PROCEDURE — 83605 ASSAY OF LACTIC ACID: CPT | Performed by: EMERGENCY MEDICINE

## 2023-11-22 PROCEDURE — 93010 EKG 12-LEAD: ICD-10-PCS | Mod: ,,, | Performed by: INTERNAL MEDICINE

## 2023-11-22 PROCEDURE — 93010 ELECTROCARDIOGRAM REPORT: CPT | Mod: ,,, | Performed by: INTERNAL MEDICINE

## 2023-11-22 PROCEDURE — 81000 URINALYSIS NONAUTO W/SCOPE: CPT | Performed by: EMERGENCY MEDICINE

## 2023-11-22 PROCEDURE — 25500020 PHARM REV CODE 255: Performed by: EMERGENCY MEDICINE

## 2023-11-22 PROCEDURE — 80053 COMPREHEN METABOLIC PANEL: CPT | Performed by: EMERGENCY MEDICINE

## 2023-11-22 PROCEDURE — 99285 EMERGENCY DEPT VISIT HI MDM: CPT | Mod: 25

## 2023-11-22 PROCEDURE — 83690 ASSAY OF LIPASE: CPT | Performed by: EMERGENCY MEDICINE

## 2023-11-22 RX ORDER — CEPHALEXIN 250 MG/1
250 CAPSULE ORAL EVERY 8 HOURS
Qty: 21 CAPSULE | Refills: 0 | Status: SHIPPED | OUTPATIENT
Start: 2023-11-22 | End: 2023-11-29

## 2023-11-22 RX ADMIN — IOHEXOL 100 ML: 350 INJECTION, SOLUTION INTRAVENOUS at 11:11

## 2023-11-22 NOTE — DISCHARGE INSTRUCTIONS
Take worse in any way.  Keflex for possible urinary tract infection, and take Tylenol and Motrin for pain.  Follow-up with your doctor, return here as needed or if worse.

## 2023-11-22 NOTE — ED PROVIDER NOTES
Encounter Date: 11/22/2023       History     Chief Complaint   Patient presents with    Abdominal Pain     Patient states she started with right upper quad abdominal pain 3 days ago and it has been getting worse.  Patient reports nausea but denies V/D.     58-year-old female here from home via private vehicle for evaluation and treatment of right flank pain which wraps into the right side of the upper abdomen.  Patient states the pain began on Sunday, 4 days ago.  Gradually worsening.  No nausea or vomiting, no loose stools or constipation, no hematuria, no fever.    No previous episodes of similar pain.  Has not tried any home medications for this problem.  Symptoms are worse with certain positions and certain movements.  Denies any traumatic injury.      Review of patient's allergies indicates:  No Known Allergies  Past Medical History:   Diagnosis Date    Adrenal nodule 09/30/2019    Arthritis     Chondromalacia, left knee 03/02/2023    Depression     Diabetes mellitus type 2, insulin dependent     Diarrhea 09/20/2019    Family history of colon cancer 08/07/2019    Fatty liver 10/13/2020    Gastroesophageal reflux disease 09/10/2019    High cholesterol     Hypertension     Liver cyst 11/04/2021    Palpitations 05/05/2023    Sleep apnea     Sprain of medial collateral ligament of left knee 12/13/2022    Tear of meniscus of left knee 01/2023     Past Surgical History:   Procedure Laterality Date    ARTHROSCOPY OF KNEE Left 03/02/2023    Procedure: ARTHROSCOPY, KNEE;  Surgeon: Ha Chawla DO;  Location: [facility] OR;  Service: Orthopedics;  Laterality: Left;    ARTHROSCOPY OF KNEE WITH ARTHROPLASTY OF PATELLOFEMORAL JOINT Left 03/02/2023    Procedure: ARTHROSCOPY, KNEE, WITH PATELLOFEMORAL;  Surgeon: Ha Chawla DO;  Location: [facility] OR;  Service: Orthopedics;  Laterality: Left;  Chondroplasty    COLONOSCOPY N/A 09/10/2019    Procedure: COLONOSCOPY;  Surgeon: Carter Rice MD;  Location: [facility];  Service:  Endoscopy;  Laterality: N/A;    ESOPHAGOGASTRODUODENOSCOPY N/A 09/10/2019    Procedure: EGD (ESOPHAGOGASTRODUODENOSCOPY);  Surgeon: Carter Rice MD;  Location: [facility] ENDO;  Service: Endoscopy;  Laterality: N/A;    HERNIA REPAIR      HYSTERECTOMY  1998    KNEE ARTHROSCOPY W/ MENISCECTOMY Left 03/02/2023    Procedure: ARTHROSCOPY, KNEE, WITH MENISCECTOMY;  Surgeon: Ha Chawla DO;  Location: [facility] OR;  Service: Orthopedics;  Laterality: Left;  PARTIAL    KNEE ARTHROSCOPY W/ PLICA EXCISION Left 03/02/2023    Procedure: EXCISION, PLICA, KNEE, ARTHROSCOPIC;  Surgeon: Ha Chawla DO;  Location: [facility] OR;  Service: Orthopedics;  Laterality: Left;    UPPER GASTROINTESTINAL ENDOSCOPY       Family History   Problem Relation Age of Onset    COPD Mother     Diabetes Mother     Hypertension Mother     Colon cancer Mother     Dementia Mother     Heart disease Father     Hypertension Father     COPD Father     Colon cancer Maternal Uncle     Breast cancer Daughter     Ovarian cancer Neg Hx     Crohn's disease Neg Hx     Esophageal cancer Neg Hx     Liver cancer Neg Hx     Stomach cancer Neg Hx     Rectal cancer Neg Hx     Ulcerative colitis Neg Hx      Social History     Tobacco Use    Smoking status: Never     Passive exposure: Past    Smokeless tobacco: Never   Substance Use Topics    Alcohol use: Yes     Comment: rarely    Drug use: No     Review of Systems   Constitutional: Negative.    HENT: Negative.     Eyes: Negative.    Respiratory: Negative.     Cardiovascular: Negative.    Gastrointestinal:  Positive for abdominal pain.   Endocrine: Negative.    Genitourinary:  Positive for flank pain.   Skin: Negative.    Allergic/Immunologic: Negative.    Neurological: Negative.    Hematological: Negative.    Psychiatric/Behavioral: Negative.         Physical Exam     Initial Vitals [11/22/23 0900]   BP Pulse Resp Temp SpO2   (!) 140/68 76 18 98.3 °F (36.8 °C) 95 %      MAP       --         Physical Exam    Nursing note and  vitals reviewed.  Constitutional: She appears well-developed and well-nourished. She is not diaphoretic. No distress.   HENT:   Head: Normocephalic and atraumatic.   Nose: Nose normal.   Mouth/Throat: Oropharynx is clear and moist. No oropharyngeal exudate.   Eyes: Conjunctivae and EOM are normal. Pupils are equal, round, and reactive to light. No scleral icterus.   Neck: Neck supple. No JVD present.   Normal range of motion.  Cardiovascular:  Normal rate, regular rhythm, normal heart sounds and intact distal pulses.           No murmur heard.  Pulmonary/Chest: Breath sounds normal. No stridor. No respiratory distress. She has no wheezes. She has no rhonchi. She has no rales.   Abdominal: Abdomen is soft. Bowel sounds are normal. She exhibits no distension. There is abdominal tenderness (Mild tenderness to palpation right upper quadrant).   Musculoskeletal:         General: No tenderness or edema. Normal range of motion.      Cervical back: Normal range of motion and neck supple.     Neurological: She is alert and oriented to person, place, and time. She has normal strength. No cranial nerve deficit or sensory deficit. GCS score is 15. GCS eye subscore is 4. GCS verbal subscore is 5. GCS motor subscore is 6.   Skin: Skin is warm and dry. Capillary refill takes less than 2 seconds. No rash noted. No erythema.   Psychiatric: She has a normal mood and affect. Her behavior is normal.         ED Course   Procedures  Labs Reviewed   CBC W/ AUTO DIFFERENTIAL - Abnormal; Notable for the following components:       Result Value    MCH 31.3 (*)     Immature Granulocytes 0.6 (*)     All other components within normal limits   COMPREHENSIVE METABOLIC PANEL - Abnormal; Notable for the following components:    Glucose 245 (*)     All other components within normal limits   URINALYSIS, REFLEX TO URINE CULTURE - Abnormal; Notable for the following components:    Glucose, UA 3+ (*)     All other components within normal limits     Narrative:     Preferred Collection Type->Urine, Clean Catch  Specimen Source->Urine   LACTIC ACID, PLASMA - Abnormal; Notable for the following components:    Lactate (Lactic Acid) 2.3 (*)     All other components within normal limits    Narrative:     Recoll. [id number] by BKS at 11/22/2023 11:11, reason: Insufficient   specimen   URINALYSIS MICROSCOPIC - Abnormal; Notable for the following components:    WBC, UA 12 (*)     Bacteria Few (*)     Yeast, UA Moderate (*)     All other components within normal limits    Narrative:     Preferred Collection Type->Urine, Clean Catch  Specimen Source->Urine   CULTURE, URINE   LIPASE     EKG Readings: (Independently Interpreted)   EKG personally reviewed by me shows normal sinus rhythm at 68 beats per minute.  CO interval 142, .  No ST elevation or depression, no T-wave change, no arrhythmia.       Imaging Results              CT Abdomen Pelvis With IV Contrast NO Oral Contrast (Final result)  Result time 11/22/23 11:52:43      Final result by Dahlia Pulliam MD (11/22/23 11:52:43)                   Impression:      No cause for the patient's right upper quadrant abdominal pain is identified.  Fatty infiltration of the liver.  Stable hepatic cyst.  Stable left ovarian cyst, adrenal adenomas.      Electronically signed by: Dahlia Pulliam  Date:    11/22/2023  Time:    11:52               Narrative:    EXAMINATION:  CT ABDOMEN PELVIS WITH IV CONTRAST    CLINICAL HISTORY:  Abdominal pain, acute, nonlocalized; right upper quadrant abdominal pain    TECHNIQUE:  Low dose axial images, sagittal and coronal reformations were obtained from the lung bases to the pubic symphysis following the IV administration of 100 mL of Omnipaque 350 .  No oral contrast was administered    COMPARISON:  CT 11/24/2021, ultrasound 10/10/2023    FINDINGS:  The visualized portions of the lung bases are clear.    There is fatty infiltration of the liver.  A 1.5 cm subcapsular cyst with  associated punctate calcification at the extreme dome of the liver is unchanged on series 2, image 16.    The gallbladder is contracted.    The spleen, pancreas and kidneys are normal.  There is stable bilateral adrenal adenomas measuring 1.5 cm on the right and 2.1 cm on the left, unchanged since 2020.    The aorta is normal in caliber with moderate calcific plaque.    There is a 2.3 cm cyst in the left ovary, unchanged since 2020.  The uterus is surgically absent.  There is no pelvic free fluid.  The urinary bladder is collapsed.    There is no obstruction nor inflammation in the large or small bowel.  There are no findings to indicate appendicitis.    No acute findings are present in the bones.                                    X-Rays:   Independently Interpreted Readings:   Other Readings:  CT abdomen pelvis, stone protocol, shows no evidence of ureteral calculus.  No hydronephrosis or hydroureter.  No evidence for colitis or diverticulitis.  Fatty liver infiltration only.    Medications   iohexoL (OMNIPAQUE 350) injection 100 mL (100 mLs Intravenous Given 11/22/23 1116)     Medical Decision Making  Differential includes cholecystitis, diverticulitis, bowel obstruction, pyelonephritis, UTI, ureteral calculus, muscle strain, etc.    Labs show some germs in the urine, no nitrites.  White count normal.  Alkaline phosphatase, ALT, and AST are good.  BUN and creatinine are good.  Electrolytes are good.  On CT, no abnormalities which would explain her pain were found.  Fatty liver infiltration only.  Will treat for UTI, and patient will be discharged home where she will take over-the-counter Tylenol and Motrin.  She will follow-up with her PCP, return here for worsening signs or symptoms.    Amount and/or Complexity of Data Reviewed  Labs: ordered.  Radiology: ordered.    Risk  Prescription drug management.                                   Clinical Impression:  Final diagnoses:  [R10.9] Right flank pain  [N39.0]  Urinary tract infection without hematuria, site unspecified (Primary)          ED Disposition Condition    Discharge Stable          ED Prescriptions       Medication Sig Dispense Start Date End Date Auth. Provider    cephALEXin (KEFLEX) 250 MG capsule Take 1 capsule (250 mg total) by mouth every 8 (eight) hours. for 7 days 21 capsule 11/22/2023 11/29/2023 Nando Spencer MD    cephALEXin (KEFLEX) 250 MG capsule Take 1 capsule (250 mg total) by mouth every 8 (eight) hours. for 7 days 21 capsule 11/22/2023 11/29/2023 Nando Spencer MD          Follow-up Information       Follow up With Specialties Details Why Contact Info    Ivory Pacheco III, MD Internal Medicine, Cardiology In 2 days  [address]  [facility] [address] [phone]      [facility] - Emergency Dept Emergency Medicine  If symptoms worsen [address] [facility] [address] [phone]             Nando Spencer MD  11/22/23 5423

## 2023-11-22 NOTE — ED NOTES
Initial assessment complete. Pt presents with RUQ pain onset 4 days. No alleviating factors, movement exacerbates the pain. Pt reports moving boxes on Sunday and believed the pain to be musculoskeletal in nature. Associated nausea. Denies vomiting and diarrhea.

## 2023-11-23 LAB
BACTERIA UR CULT: NORMAL
BACTERIA UR CULT: NORMAL

## 2023-11-29 ENCOUNTER — OFFICE VISIT (OUTPATIENT)
Dept: ORTHOPEDICS | Facility: CLINIC | Age: 58
End: 2023-11-29
Payer: COMMERCIAL

## 2023-11-29 VITALS — RESPIRATION RATE: 16 BRPM | WEIGHT: 227.94 LBS | HEIGHT: 64 IN | BODY MASS INDEX: 38.91 KG/M2

## 2023-11-29 DIAGNOSIS — M17.12 PRIMARY OSTEOARTHRITIS OF LEFT KNEE: ICD-10-CM

## 2023-11-29 DIAGNOSIS — M71.22 BAKER CYST, LEFT: ICD-10-CM

## 2023-11-29 DIAGNOSIS — M25.562 ACUTE PAIN OF LEFT KNEE: ICD-10-CM

## 2023-11-29 DIAGNOSIS — Z01.818 PRE-OP TESTING: ICD-10-CM

## 2023-11-29 DIAGNOSIS — S83.242D ACUTE MEDIAL MENISCUS TEAR, LEFT, SUBSEQUENT ENCOUNTER: Primary | ICD-10-CM

## 2023-11-29 PROCEDURE — 99999 PR PBB SHADOW E&M-EST. PATIENT-LVL IV: ICD-10-PCS | Mod: PBBFAC,,, | Performed by: ORTHOPAEDIC SURGERY

## 2023-11-29 PROCEDURE — 3051F PR MOST RECENT HEMOGLOBIN A1C LEVEL 7.0 - < 8.0%: ICD-10-PCS | Mod: S$GLB,,, | Performed by: ORTHOPAEDIC SURGERY

## 2023-11-29 PROCEDURE — 99214 PR OFFICE/OUTPT VISIT, EST, LEVL IV, 30-39 MIN: ICD-10-PCS | Mod: S$GLB,,, | Performed by: ORTHOPAEDIC SURGERY

## 2023-11-29 PROCEDURE — 1159F MED LIST DOCD IN RCRD: CPT | Mod: S$GLB,,, | Performed by: ORTHOPAEDIC SURGERY

## 2023-11-29 PROCEDURE — 3008F PR BODY MASS INDEX (BMI) DOCUMENTED: ICD-10-PCS | Mod: S$GLB,,, | Performed by: ORTHOPAEDIC SURGERY

## 2023-11-29 PROCEDURE — 3066F NEPHROPATHY DOC TX: CPT | Mod: S$GLB,,, | Performed by: ORTHOPAEDIC SURGERY

## 2023-11-29 PROCEDURE — 1159F PR MEDICATION LIST DOCUMENTED IN MEDICAL RECORD: ICD-10-PCS | Mod: S$GLB,,, | Performed by: ORTHOPAEDIC SURGERY

## 2023-11-29 PROCEDURE — 99999 PR PBB SHADOW E&M-EST. PATIENT-LVL IV: CPT | Mod: PBBFAC,,, | Performed by: ORTHOPAEDIC SURGERY

## 2023-11-29 PROCEDURE — 4010F PR ACE/ARB THEARPY RXD/TAKEN: ICD-10-PCS | Mod: S$GLB,,, | Performed by: ORTHOPAEDIC SURGERY

## 2023-11-29 PROCEDURE — 3066F PR DOCUMENTATION OF TREATMENT FOR NEPHROPATHY: ICD-10-PCS | Mod: S$GLB,,, | Performed by: ORTHOPAEDIC SURGERY

## 2023-11-29 PROCEDURE — 3051F HG A1C>EQUAL 7.0%<8.0%: CPT | Mod: S$GLB,,, | Performed by: ORTHOPAEDIC SURGERY

## 2023-11-29 PROCEDURE — 3061F NEG MICROALBUMINURIA REV: CPT | Mod: S$GLB,,, | Performed by: ORTHOPAEDIC SURGERY

## 2023-11-29 PROCEDURE — 3061F PR NEG MICROALBUMINURIA RESULT DOCUMENTED/REVIEW: ICD-10-PCS | Mod: S$GLB,,, | Performed by: ORTHOPAEDIC SURGERY

## 2023-11-29 PROCEDURE — 3008F BODY MASS INDEX DOCD: CPT | Mod: S$GLB,,, | Performed by: ORTHOPAEDIC SURGERY

## 2023-11-29 PROCEDURE — 4010F ACE/ARB THERAPY RXD/TAKEN: CPT | Mod: S$GLB,,, | Performed by: ORTHOPAEDIC SURGERY

## 2023-11-29 PROCEDURE — 99214 OFFICE O/P EST MOD 30 MIN: CPT | Mod: S$GLB,,, | Performed by: ORTHOPAEDIC SURGERY

## 2023-11-29 NOTE — H&P (VIEW-ONLY)
[facility] Complaint: Pain of the Left Knee     HPI:   Ms Alvarez is a 57-year-old lady who returned today for re-evaluation of her left knee.   She has done physical therapy which has not resolved her symptoms.  She has had steroid injections which have not helped.  She bought a hinged brace which does help.  She has had left knee pain which began when she was on vacation in 2024, when she went to turn to stop a toddler from running through a door, she banged her knee into an air conditioning unit and twisted it.  She has had pain and swelling in her knee since.  Walking or bending her knee still increases her symptoms while rest improves them.  As the day goes on the more she uses her knee she gets progressively swelling.  She has taken NSAIDs with help.   She had an arthroscopy of her left knee on 2023 where she was doing well until this most recent exacerbation.  Before her last arthroscopy she had 8 months of left knee pain which was exacerbated in late 2022 after she was on a cruise and traversing a hill; she slipped and fell twisting her knee while she was in [place].  She is concerned because she has persistent pain and swelling in her knee which is not resolving with the conservative measures she has been prescribed.  She can ambulate approximately 1 mile and climb 10-12 stairs.  She does not use an ambulatory assistive device.             Past Medical History:   Diagnosis Date    Diabetes mellitus type 2, insulin dependent      High cholesterol      Hypertension       *  *  * Sleep apnea  Seasonal allergies  GERD   Stomach ulcers                  Past Surgical History:   Procedure Laterality Date    COLONOSCOPY N/A 9/10/2019   *     ESOPHAGOGASTRODUODENOSCOPY N/A 9/10/2019     Procedure: EGD (ESOPHAGOGASTRODUODENOSCOPY);  Surgeon: Carter Rice MD;  Location: [facility];  Service: Endoscopy;  Laterality: N/A;    HERNIA REPAIR UMBILICAL         * HYSTERECTOMY  ATS LEFT KNEE             Review of patient's allergies indicates:  No Known Allergies     Social History                Occupational History       Tobacco Use    Smoking status: Never    Smokeless tobacco: Never   Substance and Sexual Activity    Alcohol use: Yes       Comment: rarely    Drug use: No    Sexual activity: Not on file                Family History   Problem Relation Age of Onset    Diabetes Mother      Hypertension Mother      Colon cancer Mother      Hypertension Father      Breast cancer Daughter      Ovarian cancer Neg Hx           Previous Hospitalizations:  Childbirth     ROS:  New diagnosis/surgery/prescriptions since last office visit on 11/15/2023:  Kidney infection.  Constitutional: Negative for chills and fever.   HENT:  Negative for congestion.    Eyes:  Negative for blurred vision and double vision.   Cardiovascular:  Negative for chest pain and cyanosis.   Respiratory:  Negative for cough and shortness of breath.    Endocrine: Negative for cold intolerance and polydipsia.   Hematologic/Lymphatic: Negative for adenopathy.   Skin:  Negative for flushing, itching and rash.   Musculoskeletal:  Positive for joint pain. Negative for gout.   Gastrointestinal:  Negative for constipation, diarrhea and heartburn.   Genitourinary:  Negative for nocturia.   Neurological:  Negative for headaches and seizures.   Psychiatric/Behavioral:  Negative for depression and substance abuse. The patient is not nervous/anxious.    Allergic/Immunologic: Positive for environmental allergies.          Objective:   Physical Exam:   General: AAOx3.  No acute distress  HEENT: Normocephalic, PEARLA EOMI, Good Dentition  Neck: Supple, No JVD  Chest: Symetric, equal excursion on inspiration  Abdomen: Soft NTND  Vascular:  Pulses intact and equal bilaterally.  Capillary refill less than 3 seconds and equal bilaterally  Neurologic:  Pinprick and soft touch intact and equal bilaterally  Integment:  No ecchymosis, no  errythema  Extremity:  Knee:  Extension/flexion right knee-1/118 degrees, left knee 1/112°.  Mild effusion left knee.  Positive drop home left knee.  Baker cyst, left knee.  Crepitus with motion both knees.  Negative patellar load/compression bilaterally.  Negative patella apprehension/relocation bilaterally.  Varus/valgus stressing with mild increased excursion with valgus stressing varus with good endpoint.  Lachman's/drawer equal bilaterally with endpoint.  Positive joint line tenderness left knee.  Sharron mildly positive left knee.  [place] positive left knee.  Tender at the anserine insertion left knee.  Mild swelling at the anserine insertion left knee .  Radiography:  Personally reviewed MRI of the left knee completed on 11/20/2023 which showed a recurrent medial meniscus tear with some mild chondromalacia.  There is also a mild LCL and MCL sprain.        Assessment:       Impression:       1. Recurrent medial meniscus tear, left knee   2.   3.   4.  Left knee DJD  Sr cyst left knee  Left knee pain            Plan:       1.  Discussed physical examination and radiographic findings with the patient. Zunilda understands that she has a recurrent medial meniscus tear and chondromalacia of her left knee.  Treatment alternatives and outcomes were discussed with the patient she understands she could continue to be treated conservatively with observation, activity modification, NSAIDs, bracing, physical therapy, injections, or she could consider surgical intervention such as arthroscopy.  She feels she has failed conservative management and would like to schedule surgery.  2. Possible complications of surgery to include bleeding, infection, scarring, nerve/blood vessel/tendon damage, need for further surgery, failed surgery, failure to improve, possible persistent pain, possible arthritis, possible arthrofibrosis, and possible recurrence were discussed with the patient.  She was permitted to ask questions and  all concerns were addressed to her satisfaction.  3. Consent for arthroscopy left knee.  4. Tentatively schedule surgery for 12/14/2023.    5. [place] 7.5/325, 1 p.o. Q 6 hours p.r.n. pain, dispense 28, refill 0, prescription was given to the patient have filled she understands this is for postop use..    6. Continue with brace wear this was discussed with the patient.    7. Continue with NSAIDs as tolerated and allowed by PCM.  8. Continue to use Voltaren gel apply per box instructions and massage in for 2 minutes twice daily   9. Continue do home exercises such as quadriceps and hamstring strengthening.  10. Follow up 10-12 days postop

## 2023-11-29 NOTE — PROGRESS NOTES
Chief Complaint: Pain of the Left Knee     HPI:   Ms Alvarez is a 57-year-old lady who returned today for re-evaluation of her left knee.   She has done physical therapy which has not resolved her symptoms.  She has had steroid injections which have not helped.  She bought a hinged brace which does help.  She has had left knee pain which began when she was on vacation in 2024, when she went to turn to stop a toddler from running through a door, she banged her knee into an air conditioning unit and twisted it.  She has had pain and swelling in her knee since.  Walking or bending her knee still increases her symptoms while rest improves them.  As the day goes on the more she uses her knee she gets progressively swelling.  She has taken NSAIDs with help.   She had an arthroscopy of her left knee on 2023 where she was doing well until this most recent exacerbation.  Before her last arthroscopy she had 8 months of left knee pain which was exacerbated in late 2022 after she was on a cruise and traversing a hill; she slipped and fell twisting her knee while she was in [place].  She is concerned because she has persistent pain and swelling in her knee which is not resolving with the conservative measures she has been prescribed.  She can ambulate approximately 1 mile and climb 10-12 stairs.  She does not use an ambulatory assistive device.             Past Medical History:   Diagnosis Date    Diabetes mellitus type 2, insulin dependent      High cholesterol      Hypertension       *  *  * Sleep apnea  Seasonal allergies  GERD   Stomach ulcers                  Past Surgical History:   Procedure Laterality Date    COLONOSCOPY N/A 9/10/2019   *     ESOPHAGOGASTRODUODENOSCOPY N/A 9/10/2019     Procedure: EGD (ESOPHAGOGASTRODUODENOSCOPY);  Surgeon: Carter Rice MD;  Location: [facility];  Service: Endoscopy;  Laterality: N/A;    HERNIA REPAIR UMBILICAL         * HYSTERECTOMY  ATS LEFT KNEE             Review of patient's allergies indicates:  No Known Allergies     Social History                Occupational History       Tobacco Use    Smoking status: Never    Smokeless tobacco: Never   Substance and Sexual Activity    Alcohol use: Yes       Comment: rarely    Drug use: No    Sexual activity: Not on file                Family History   Problem Relation Age of Onset    Diabetes Mother      Hypertension Mother      Colon cancer Mother      Hypertension Father      Breast cancer Daughter      Ovarian cancer Neg Hx           Previous Hospitalizations:  Childbirth     ROS:  New diagnosis/surgery/prescriptions since last office visit on 11/15/2023:  Kidney infection.  Constitutional: Negative for chills and fever.   HENT:  Negative for congestion.    Eyes:  Negative for blurred vision and double vision.   Cardiovascular:  Negative for chest pain and cyanosis.   Respiratory:  Negative for cough and shortness of breath.    Endocrine: Negative for cold intolerance and polydipsia.   Hematologic/Lymphatic: Negative for adenopathy.   Skin:  Negative for flushing, itching and rash.   Musculoskeletal:  Positive for joint pain. Negative for gout.   Gastrointestinal:  Negative for constipation, diarrhea and heartburn.   Genitourinary:  Negative for nocturia.   Neurological:  Negative for headaches and seizures.   Psychiatric/Behavioral:  Negative for depression and substance abuse. The patient is not nervous/anxious.    Allergic/Immunologic: Positive for environmental allergies.          Objective:   Physical Exam:   General: AAOx3.  No acute distress  HEENT: Normocephalic, PEARLA EOMI, Good Dentition  Neck: Supple, No JVD  Chest: Symetric, equal excursion on inspiration  Abdomen: Soft NTND  Vascular:  Pulses intact and equal bilaterally.  Capillary refill less than 3 seconds and equal bilaterally  Neurologic:  Pinprick and soft touch intact and equal bilaterally  Integment:  No ecchymosis, no  errythema  Extremity:  Knee:  Extension/flexion right knee-1/118 degrees, left knee 1/112°.  Mild effusion left knee.  Positive drop home left knee.  Baker cyst, left knee.  Crepitus with motion both knees.  Negative patellar load/compression bilaterally.  Negative patella apprehension/relocation bilaterally.  Varus/valgus stressing with mild increased excursion with valgus stressing varus with good endpoint.  Lachman's/drawer equal bilaterally with endpoint.  Positive joint line tenderness left knee.  [place]ar[place] mildly positive left knee.  [place] positive left knee.  Tender at the anserine insertion left knee.  Mild swelling at the anserine insertion left knee .  Radiography:  Personally reviewed MRI of the left knee completed on 11/20/2023 which showed a recurrent medial meniscus tear with some mild chondromalacia.  There is also a mild LCL and MCL sprain.        Assessment:       Impression:       1. Recurrent medial meniscus tear, left knee   2.   3.   4.  Left knee DJD  Sr cyst left knee  Left knee pain            Plan:       1.  Discussed physical examination and radiographic findings with the patient. Zunilda understands that she has a recurrent medial meniscus tear and chondromalacia of her left knee.  Treatment alternatives and outcomes were discussed with the patient she understands she could continue to be treated conservatively with observation, activity modification, NSAIDs, bracing, physical therapy, injections, or she could consider surgical intervention such as arthroscopy.  She feels she has failed conservative management and would like to schedule surgery.  2. Possible complications of surgery to include bleeding, infection, scarring, nerve/blood vessel/tendon damage, need for further surgery, failed surgery, failure to improve, possible persistent pain, possible arthritis, possible arthrofibrosis, and possible recurrence were discussed with the patient.  She was permitted to ask questions and  all concerns were addressed to her satisfaction.  3. Consent for arthroscopy left knee.  4. Tentatively schedule surgery for 12/14/2023.    5. [place] 7.5/325, 1 p.o. Q 6 hours p.r.n. pain, dispense 28, refill 0, prescription was given to the patient have filled she understands this is for postop use..    6. Continue with brace wear this was discussed with the patient.    7. Continue with NSAIDs as tolerated and allowed by PCM.  8. Continue to use Voltaren gel apply per box instructions and massage in for 2 minutes twice daily   9. Continue do home exercises such as quadriceps and hamstring strengthening.  10. Follow up 10-12 days postop

## 2023-11-29 NOTE — H&P
[facility] Complaint: Pain of the Left Knee     HPI:   Ms Alvarez is a 57-year-old lady who returned today for re-evaluation of her left knee.   She has done physical therapy which has not resolved her symptoms.  She has had steroid injections which have not helped.  She bought a hinged brace which does help.  She has had left knee pain which began when she was on vacation in 2024, when she went to turn to stop a toddler from running through a door, she banged her knee into an air conditioning unit and twisted it.  She has had pain and swelling in her knee since.  Walking or bending her knee still increases her symptoms while rest improves them.  As the day goes on the more she uses her knee she gets progressively swelling.  She has taken NSAIDs with help.   She had an arthroscopy of her left knee on 2023 where she was doing well until this most recent exacerbation.  Before her last arthroscopy she had 8 months of left knee pain which was exacerbated in late 2022 after she was on a cruise and traversing a hill; she slipped and fell twisting her knee while she was in [place].  She is concerned because she has persistent pain and swelling in her knee which is not resolving with the conservative measures she has been prescribed.  She can ambulate approximately 1 mile and climb 10-12 stairs.  She does not use an ambulatory assistive device.             Past Medical History:   Diagnosis Date    Diabetes mellitus type 2, insulin dependent      High cholesterol      Hypertension       *  *  * Sleep apnea  Seasonal allergies  GERD   Stomach ulcers                  Past Surgical History:   Procedure Laterality Date    COLONOSCOPY N/A 9/10/2019   *     ESOPHAGOGASTRODUODENOSCOPY N/A 9/10/2019     Procedure: EGD (ESOPHAGOGASTRODUODENOSCOPY);  Surgeon: Carter Rice MD;  Location: [facility];  Service: Endoscopy;  Laterality: N/A;    HERNIA REPAIR UMBILICAL         * HYSTERECTOMY  ATS LEFT KNEE             Review of patient's allergies indicates:  No Known Allergies     Social History                Occupational History       Tobacco Use    Smoking status: Never    Smokeless tobacco: Never   Substance and Sexual Activity    Alcohol use: Yes       Comment: rarely    Drug use: No    Sexual activity: Not on file                Family History   Problem Relation Age of Onset    Diabetes Mother      Hypertension Mother      Colon cancer Mother      Hypertension Father      Breast cancer Daughter      Ovarian cancer Neg Hx           Previous Hospitalizations:  Childbirth     ROS:  New diagnosis/surgery/prescriptions since last office visit on 11/15/2023:  Kidney infection.  Constitutional: Negative for chills and fever.   HENT:  Negative for congestion.    Eyes:  Negative for blurred vision and double vision.   Cardiovascular:  Negative for chest pain and cyanosis.   Respiratory:  Negative for cough and shortness of breath.    Endocrine: Negative for cold intolerance and polydipsia.   Hematologic/Lymphatic: Negative for adenopathy.   Skin:  Negative for flushing, itching and rash.   Musculoskeletal:  Positive for joint pain. Negative for gout.   Gastrointestinal:  Negative for constipation, diarrhea and heartburn.   Genitourinary:  Negative for nocturia.   Neurological:  Negative for headaches and seizures.   Psychiatric/Behavioral:  Negative for depression and substance abuse. The patient is not nervous/anxious.    Allergic/Immunologic: Positive for environmental allergies.          Objective:   Physical Exam:   General: AAOx3.  No acute distress  HEENT: Normocephalic, PEARLA EOMI, Good Dentition  Neck: Supple, No JVD  Chest: Symetric, equal excursion on inspiration  Abdomen: Soft NTND  Vascular:  Pulses intact and equal bilaterally.  Capillary refill less than 3 seconds and equal bilaterally  Neurologic:  Pinprick and soft touch intact and equal bilaterally  Integment:  No ecchymosis, no  errythema  Extremity:  Knee:  Extension/flexion right knee-1/118 degrees, left knee 1/112°.  Mild effusion left knee.  Positive drop home left knee.  Baker cyst, left knee.  Crepitus with motion both knees.  Negative patellar load/compression bilaterally.  Negative patella apprehension/relocation bilaterally.  Varus/valgus stressing with mild increased excursion with valgus stressing varus with good endpoint.  Lachman's/drawer equal bilaterally with endpoint.  Positive joint line tenderness left knee.  Sharron mildly positive left knee.  [place] positive left knee.  Tender at the anserine insertion left knee.  Mild swelling at the anserine insertion left knee .  Radiography:  Personally reviewed MRI of the left knee completed on 11/20/2023 which showed a recurrent medial meniscus tear with some mild chondromalacia.  There is also a mild LCL and MCL sprain.        Assessment:       Impression:       1. Recurrent medial meniscus tear, left knee   2.   3.   4.  Left knee DJD  Sr cyst left knee  Left knee pain            Plan:       1.  Discussed physical examination and radiographic findings with the patient. Zunilda understands that she has a recurrent medial meniscus tear and chondromalacia of her left knee.  Treatment alternatives and outcomes were discussed with the patient she understands she could continue to be treated conservatively with observation, activity modification, NSAIDs, bracing, physical therapy, injections, or she could consider surgical intervention such as arthroscopy.  She feels she has failed conservative management and would like to schedule surgery.  2. Possible complications of surgery to include bleeding, infection, scarring, nerve/blood vessel/tendon damage, need for further surgery, failed surgery, failure to improve, possible persistent pain, possible arthritis, possible arthrofibrosis, and possible recurrence were discussed with the patient.  She was permitted to ask questions and  all concerns were addressed to her satisfaction.  3. Consent for arthroscopy left knee.  4. Tentatively schedule surgery for 12/14/2023.    5. [place] 7.5/325, 1 p.o. Q 6 hours p.r.n. pain, dispense 28, refill 0, prescription was given to the patient have filled she understands this is for postop use..    6. Continue with brace wear this was discussed with the patient.    7. Continue with NSAIDs as tolerated and allowed by PCM.  8. Continue to use Voltaren gel apply per box instructions and massage in for 2 minutes twice daily   9. Continue do home exercises such as quadriceps and hamstring strengthening.  10. Follow up 10-12 days postop

## 2023-12-06 ENCOUNTER — ANESTHESIA EVENT (OUTPATIENT)
Dept: SURGERY | Facility: HOSPITAL | Age: 58
End: 2023-12-06
Payer: COMMERCIAL

## 2023-12-06 NOTE — ANESTHESIA PREPROCEDURE EVALUATION
12/06/2023  Zunilda Alvarez is a 58 y.o., female.      Pre-op Assessment    I have reviewed the Patient Summary Reports.     I have reviewed the Nursing Notes. I have reviewed the NPO Status.   I have reviewed the Medications.     Review of Systems  Anesthesia Hx:  No problems with previous Anesthesia   ,History Of Prior Surgeries: hyst, EGD, C/S, KS, c'socpe, hernia.          Denies Family Hx of Anesthesia complications.    Denies Personal Hx of Anesthesia complications.                    Social:  Non-Smoker       Hematology/Oncology:  Hematology Normal   Oncology Normal                                   EENT/Dental:  EENT/Dental Normal           Cardiovascular:     Hypertension           hyperlipidemia   ECG has been reviewed. NSR 68                         Pulmonary:        Sleep Apnea Listed as problem.  Do not see sleep study               Renal/:  Renal/ Normal                 Hepatic/GI:     GERD Liver Disease,  Fatty liver  BMI 40  Ozempic use          Musculoskeletal:  Arthritis               Neurological:  Neurology Normal                                      Endocrine:  Diabetes   A1C 7.7  Adrenal nodule listed as problem      Obesity / BMI > 30  Dermatological:  Skin Normal    Psych:  Psychiatric History  depression                Physical Exam  General: Well nourished, Cooperative, Alert and Oriented    Airway:  Mallampati: III   Mouth Opening: Small, but > 3cm  TM Distance: < 4 cm  Tongue: Normal  Neck ROM: Normal ROM    Dental:  Intact    Chest/Lungs:  Clear to auscultation, Normal Respiratory Rate    Heart:  Rate: Normal  Rhythm: Regular Rhythm        Anesthesia Plan  Type of Anesthesia, risks & benefits discussed:    Anesthesia Type: Gen ETT  Intra-op Monitoring Plan: Standard ASA Monitors  Post Op Pain Control Plan: multimodal analgesia  Induction:  IV  Airway Plan: Video,  Post-Induction  Informed Consent: Informed consent signed with the Patient and all parties understand the risks and agree with anesthesia plan.  All questions answered.   ASA Score: 3  Day of Surgery Review of History & Physical: H&P Update referred to the surgeon/provider.    Ready For Surgery From Anesthesia Perspective.     .

## 2023-12-07 ENCOUNTER — HOSPITAL ENCOUNTER (OUTPATIENT)
Dept: PREADMISSION TESTING | Facility: HOSPITAL | Age: 58
Discharge: HOME | End: 2023-12-07
Attending: ORTHOPAEDIC SURGERY
Payer: COMMERCIAL

## 2023-12-14 ENCOUNTER — HOSPITAL ENCOUNTER (OUTPATIENT)
Facility: HOSPITAL | Age: 58
Discharge: HOME | End: 2023-12-14
Attending: ORTHOPAEDIC SURGERY | Admitting: ORTHOPAEDIC SURGERY
Payer: COMMERCIAL

## 2023-12-14 ENCOUNTER — ANESTHESIA (OUTPATIENT)
Dept: SURGERY | Facility: HOSPITAL | Age: 58
End: 2023-12-14
Payer: COMMERCIAL

## 2023-12-14 VITALS
WEIGHT: 228 LBS | DIASTOLIC BLOOD PRESSURE: 62 MMHG | SYSTOLIC BLOOD PRESSURE: 124 MMHG | OXYGEN SATURATION: 96 % | TEMPERATURE: 98 F | RESPIRATION RATE: 15 BRPM | BODY MASS INDEX: 38.93 KG/M2 | HEIGHT: 64 IN | HEART RATE: 71 BPM

## 2023-12-14 DIAGNOSIS — M17.12 PRIMARY OSTEOARTHRITIS OF LEFT KNEE: ICD-10-CM

## 2023-12-14 DIAGNOSIS — S83.242D ACUTE MEDIAL MENISCUS TEAR, LEFT, SUBSEQUENT ENCOUNTER: Primary | ICD-10-CM

## 2023-12-14 LAB
POCT GLUCOSE: 123 MG/DL (ref 70–110)
POCT GLUCOSE: 138 MG/DL (ref 70–110)

## 2023-12-14 PROCEDURE — 63600175 PHARM REV CODE 636 W HCPCS: Performed by: ANESTHESIOLOGY

## 2023-12-14 PROCEDURE — 37000009 HC ANESTHESIA EA ADD 15 MINS: Performed by: ORTHOPAEDIC SURGERY

## 2023-12-14 PROCEDURE — 71000033 HC RECOVERY, INTIAL HOUR: Performed by: ORTHOPAEDIC SURGERY

## 2023-12-14 PROCEDURE — 25000003 PHARM REV CODE 250: Performed by: NURSE ANESTHETIST, CERTIFIED REGISTERED

## 2023-12-14 PROCEDURE — 25000242 PHARM REV CODE 250 ALT 637 W/ HCPCS

## 2023-12-14 PROCEDURE — 29880 PR KNEE SCOPE MED/LAT MENISCECTOMY: ICD-10-PCS | Mod: LT,,, | Performed by: ORTHOPAEDIC SURGERY

## 2023-12-14 PROCEDURE — 71000015 HC POSTOP RECOV 1ST HR: Performed by: ORTHOPAEDIC SURGERY

## 2023-12-14 PROCEDURE — 27201423 OPTIME MED/SURG SUP & DEVICES STERILE SUPPLY: Performed by: ORTHOPAEDIC SURGERY

## 2023-12-14 PROCEDURE — 63600175 PHARM REV CODE 636 W HCPCS: Performed by: ORTHOPAEDIC SURGERY

## 2023-12-14 PROCEDURE — 29880 ARTHRS KNE SRG MNISECTMY M&L: CPT | Mod: LT,,, | Performed by: ORTHOPAEDIC SURGERY

## 2023-12-14 PROCEDURE — 63600175 PHARM REV CODE 636 W HCPCS: Performed by: NURSE ANESTHETIST, CERTIFIED REGISTERED

## 2023-12-14 PROCEDURE — 25000003 PHARM REV CODE 250: Performed by: ORTHOPAEDIC SURGERY

## 2023-12-14 PROCEDURE — 37000008 HC ANESTHESIA 1ST 15 MINUTES: Performed by: ORTHOPAEDIC SURGERY

## 2023-12-14 PROCEDURE — 36000710: Performed by: ORTHOPAEDIC SURGERY

## 2023-12-14 PROCEDURE — D9220A PRA ANESTHESIA: Mod: CRNA,,, | Performed by: NURSE ANESTHETIST, CERTIFIED REGISTERED

## 2023-12-14 PROCEDURE — 36000711: Performed by: ORTHOPAEDIC SURGERY

## 2023-12-14 PROCEDURE — 71000016 HC POSTOP RECOV ADDL HR: Performed by: ORTHOPAEDIC SURGERY

## 2023-12-14 PROCEDURE — D9220A PRA ANESTHESIA: ICD-10-PCS | Mod: CRNA,,, | Performed by: NURSE ANESTHETIST, CERTIFIED REGISTERED

## 2023-12-14 PROCEDURE — D9220A PRA ANESTHESIA: Mod: ANES,,, | Performed by: ANESTHESIOLOGY

## 2023-12-14 PROCEDURE — D9220A PRA ANESTHESIA: ICD-10-PCS | Mod: ANES,,, | Performed by: ANESTHESIOLOGY

## 2023-12-14 RX ORDER — HYDROMORPHONE HYDROCHLORIDE 1 MG/ML
0.5 INJECTION, SOLUTION INTRAMUSCULAR; INTRAVENOUS; SUBCUTANEOUS EVERY 5 MIN PRN
Status: DISCONTINUED | OUTPATIENT
Start: 2023-12-14 | End: 2023-12-14 | Stop reason: HOSPADM

## 2023-12-14 RX ORDER — PROPOFOL 10 MG/ML
VIAL (ML) INTRAVENOUS
Status: DISCONTINUED | OUTPATIENT
Start: 2023-12-14 | End: 2023-12-14

## 2023-12-14 RX ORDER — LIDOCAINE HYDROCHLORIDE 10 MG/ML
1 INJECTION, SOLUTION EPIDURAL; INFILTRATION; INTRACAUDAL; PERINEURAL ONCE
Status: DISCONTINUED | OUTPATIENT
Start: 2023-12-14 | End: 2023-12-14 | Stop reason: HOSPADM

## 2023-12-14 RX ORDER — ALBUTEROL SULFATE 0.83 MG/ML
2.5 SOLUTION RESPIRATORY (INHALATION) EVERY 4 HOURS PRN
Status: DISCONTINUED | OUTPATIENT
Start: 2023-12-14 | End: 2023-12-14 | Stop reason: HOSPADM

## 2023-12-14 RX ORDER — SODIUM CHLORIDE, SODIUM LACTATE, POTASSIUM CHLORIDE, CALCIUM CHLORIDE 600; 310; 30; 20 MG/100ML; MG/100ML; MG/100ML; MG/100ML
125 INJECTION, SOLUTION INTRAVENOUS CONTINUOUS
Status: DISCONTINUED | OUTPATIENT
Start: 2023-12-14 | End: 2023-12-14 | Stop reason: HOSPADM

## 2023-12-14 RX ORDER — OXYCODONE HYDROCHLORIDE 5 MG/1
5 TABLET ORAL ONCE AS NEEDED
Status: DISCONTINUED | OUTPATIENT
Start: 2023-12-14 | End: 2023-12-14 | Stop reason: HOSPADM

## 2023-12-14 RX ORDER — FENTANYL CITRATE 50 UG/ML
INJECTION, SOLUTION INTRAMUSCULAR; INTRAVENOUS
Status: DISCONTINUED | OUTPATIENT
Start: 2023-12-14 | End: 2023-12-14

## 2023-12-14 RX ORDER — DEXAMETHASONE SODIUM PHOSPHATE 4 MG/ML
INJECTION, SOLUTION INTRA-ARTICULAR; INTRALESIONAL; INTRAMUSCULAR; INTRAVENOUS; SOFT TISSUE
Status: DISCONTINUED | OUTPATIENT
Start: 2023-12-14 | End: 2023-12-14

## 2023-12-14 RX ORDER — BUPIVACAINE HYDROCHLORIDE 5 MG/ML
INJECTION, SOLUTION EPIDURAL; INTRACAUDAL
Status: DISCONTINUED | OUTPATIENT
Start: 2023-12-14 | End: 2023-12-14 | Stop reason: HOSPADM

## 2023-12-14 RX ORDER — SODIUM CHLORIDE, SODIUM LACTATE, POTASSIUM CHLORIDE, CALCIUM CHLORIDE 600; 310; 30; 20 MG/100ML; MG/100ML; MG/100ML; MG/100ML
INJECTION, SOLUTION INTRAVENOUS CONTINUOUS
Status: DISCONTINUED | OUTPATIENT
Start: 2023-12-14 | End: 2023-12-14 | Stop reason: HOSPADM

## 2023-12-14 RX ORDER — CEFAZOLIN SODIUM 1 G/3ML
INJECTION, POWDER, FOR SOLUTION INTRAMUSCULAR; INTRAVENOUS
Status: COMPLETED
Start: 2023-12-14 | End: 2023-12-14

## 2023-12-14 RX ORDER — EPINEPHRINE 1 MG/ML
INJECTION INTRAMUSCULAR; INTRAVENOUS; SUBCUTANEOUS
Status: DISCONTINUED | OUTPATIENT
Start: 2023-12-14 | End: 2023-12-14 | Stop reason: HOSPADM

## 2023-12-14 RX ORDER — MIDAZOLAM HYDROCHLORIDE 1 MG/ML
INJECTION INTRAMUSCULAR; INTRAVENOUS
Status: DISCONTINUED | OUTPATIENT
Start: 2023-12-14 | End: 2023-12-14

## 2023-12-14 RX ORDER — ALBUTEROL SULFATE 0.83 MG/ML
SOLUTION RESPIRATORY (INHALATION)
Status: COMPLETED
Start: 2023-12-14 | End: 2023-12-14

## 2023-12-14 RX ORDER — ACETAMINOPHEN 10 MG/ML
INJECTION, SOLUTION INTRAVENOUS
Status: DISCONTINUED | OUTPATIENT
Start: 2023-12-14 | End: 2023-12-14

## 2023-12-14 RX ORDER — EPHEDRINE SULFATE 50 MG/ML
INJECTION, SOLUTION INTRAVENOUS
Status: DISCONTINUED | OUTPATIENT
Start: 2023-12-14 | End: 2023-12-14

## 2023-12-14 RX ORDER — ONDANSETRON 2 MG/ML
INJECTION INTRAMUSCULAR; INTRAVENOUS
Status: DISCONTINUED | OUTPATIENT
Start: 2023-12-14 | End: 2023-12-14

## 2023-12-14 RX ORDER — CEFAZOLIN SODIUM 1 G/3ML
INJECTION, POWDER, FOR SOLUTION INTRAMUSCULAR; INTRAVENOUS
Status: DISCONTINUED | OUTPATIENT
Start: 2023-12-14 | End: 2023-12-14

## 2023-12-14 RX ORDER — SUCCINYLCHOLINE CHLORIDE 20 MG/ML
INJECTION INTRAMUSCULAR; INTRAVENOUS
Status: DISCONTINUED | OUTPATIENT
Start: 2023-12-14 | End: 2023-12-14

## 2023-12-14 RX ORDER — LIDOCAINE HYDROCHLORIDE 20 MG/ML
INJECTION, SOLUTION EPIDURAL; INFILTRATION; INTRACAUDAL; PERINEURAL
Status: DISCONTINUED | OUTPATIENT
Start: 2023-12-14 | End: 2023-12-14

## 2023-12-14 RX ORDER — ONDANSETRON 2 MG/ML
4 INJECTION INTRAMUSCULAR; INTRAVENOUS DAILY PRN
Status: DISCONTINUED | OUTPATIENT
Start: 2023-12-14 | End: 2023-12-14 | Stop reason: HOSPADM

## 2023-12-14 RX ORDER — LIDOCAINE HYDROCHLORIDE AND EPINEPHRINE 20; 10 MG/ML; UG/ML
INJECTION, SOLUTION INFILTRATION; PERINEURAL
Status: DISCONTINUED | OUTPATIENT
Start: 2023-12-14 | End: 2023-12-14 | Stop reason: HOSPADM

## 2023-12-14 RX ADMIN — GLYCOPYRROLATE 0.2 MG: 0.2 INJECTION INTRAMUSCULAR; INTRAVENOUS at 11:12

## 2023-12-14 RX ADMIN — LIDOCAINE HYDROCHLORIDE 100 MG: 20 INJECTION, SOLUTION EPIDURAL; INFILTRATION; INTRACAUDAL; PERINEURAL at 11:12

## 2023-12-14 RX ADMIN — SODIUM CHLORIDE, POTASSIUM CHLORIDE, SODIUM LACTATE AND CALCIUM CHLORIDE: 600; 310; 30; 20 INJECTION, SOLUTION INTRAVENOUS at 12:12

## 2023-12-14 RX ADMIN — SUCCINYLCHOLINE CHLORIDE 120 MG: 20 INJECTION, SOLUTION INTRAMUSCULAR; INTRAVENOUS at 11:12

## 2023-12-14 RX ADMIN — MIDAZOLAM HYDROCHLORIDE 2 MG: 1 INJECTION, SOLUTION INTRAMUSCULAR; INTRAVENOUS at 11:12

## 2023-12-14 RX ADMIN — ALBUTEROL SULFATE 2.5 MG: 2.5 SOLUTION RESPIRATORY (INHALATION) at 01:12

## 2023-12-14 RX ADMIN — DEXAMETHASONE SODIUM PHOSPHATE 8 MG: 4 INJECTION, SOLUTION INTRAMUSCULAR; INTRAVENOUS at 11:12

## 2023-12-14 RX ADMIN — CEFAZOLIN 2 G: 330 INJECTION, POWDER, FOR SOLUTION INTRAMUSCULAR; INTRAVENOUS at 11:12

## 2023-12-14 RX ADMIN — ONDANSETRON 8 MG: 2 INJECTION INTRAMUSCULAR; INTRAVENOUS at 11:12

## 2023-12-14 RX ADMIN — EPHEDRINE SULFATE 25 MG: 50 INJECTION INTRAVENOUS at 11:12

## 2023-12-14 RX ADMIN — FENTANYL CITRATE 100 MCG: 50 INJECTION, SOLUTION INTRAMUSCULAR; INTRAVENOUS at 11:12

## 2023-12-14 RX ADMIN — ALBUTEROL SULFATE 2.5 MG: 0.83 SOLUTION RESPIRATORY (INHALATION) at 01:12

## 2023-12-14 RX ADMIN — ACETAMINOPHEN 1000 MG: 10 INJECTION, SOLUTION INTRAVENOUS at 11:12

## 2023-12-14 RX ADMIN — SODIUM CHLORIDE, POTASSIUM CHLORIDE, SODIUM LACTATE AND CALCIUM CHLORIDE: 600; 310; 30; 20 INJECTION, SOLUTION INTRAVENOUS at 11:12

## 2023-12-14 RX ADMIN — PROPOFOL 150 MG: 10 INJECTION, EMULSION INTRAVENOUS at 11:12

## 2023-12-14 NOTE — TRANSFER OF CARE
"Anesthesia Transfer of Care Note    Patient: Zunilda Alvarez    Procedure(s) Performed: Procedure(s) (LRB):  ARTHROSCOPY, KNEE (Left)  ARTHROSCOPY, KNEE, WITH MENISCECTOMY (Left)  ARTHROSCOPY, KNEE, WITH CHONDROPLASTY (Left)    Patient location: PACU    Anesthesia Type: general    Transport from OR: Transported from OR on room air with adequate spontaneous ventilation    Post pain: adequate analgesia    Post assessment: no apparent anesthetic complications and tolerated procedure well    Post vital signs: stable    Level of consciousness: awake, alert and oriented    Nausea/Vomiting: no nausea/vomiting    Complications: none    Transfer of care protocol was followed      Last vitals: Visit Vitals  BP (!) 148/83 (BP Location: Right arm, Patient Position: Lying)   Pulse 61   Temp 36.3 °C (97.3 °F) (Tympanic)   Resp 16   Ht 5' 4" (1.626 m)   Wt 103.4 kg (228 lb)   SpO2 95%   Breastfeeding No   BMI 39.14 kg/m²     "

## 2023-12-14 NOTE — PLAN OF CARE
"Requested Prescriptions   Pending Prescriptions Disp Refills     escitalopram (LEXAPRO) 20 MG tablet [Pharmacy Med Name: ESCITALOPRAM 20 MG TABLET] 135 tablet 3     Sig: TAKE 1 & 1/2 TABLETS (30 MG) BY MOUTH AT BEDTIME.   Last Written Prescription Date:  1/31/19  Last Fill Quantity: 135,  # refills: 3   Last office visit: 1/31/2019 with prescribing provider:  Jonny Corley PA-C   Future Office Visit:        SSRIs Protocol Failed - 3/6/2020  1:53 AM   PHQ-9 SCORE 8/4/2015 1/29/2018 1/31/2019   PHQ-9 Total Score 0 - -   PHQ-9 Total Score MyChart - 15 (Moderately severe depression) -   PHQ-9 Total Score - 15 13     DARYN-7 SCORE 8/4/2015 1/29/2018 1/31/2019   Total Score 6 - -   Total Score - 13 12            Failed - Recent (12 mo) or future (30 days) visit within the authorizing provider's specialty     Patient has had an office visit with the authorizing provider or a provider within the authorizing providers department within the previous 12 mos or has a future within next 30 days. See \"Patient Info\" tab in inbasket, or \"Choose Columns\" in Meds & Orders section of the refill encounter.              Passed - Medication is active on med list        Passed - Patient is age 18 or older         " Has met unit/department guidelines for discharge from each phase of the post procedure continuum. Leaving floor per w/c with RN. KOMAL x3. Resp even and unlabored room air. No distress noted. Tolerating Po fluids without c/o nausea/vomiting. Post-op dsg clean, dry and intact to left knee. Ice pack and I.S. sent home with pt. Denies c/o pain or any other bothersome symptoms. Neuro checks WNL LLE. All personal belongings returned to pt.

## 2023-12-14 NOTE — ANESTHESIA PROCEDURE NOTES
Intubation    Date/Time: 12/14/2023 11:42 AM    Performed by: Janet Ruiz CRNA  Authorized by: Kumar Mary MD    Intubation:     Induction:  Intravenous    Intubated:  Postinduction    Mask Ventilation:  Easy mask    Attempts:  1    Attempted By:  CRNA    Method of Intubation:  Video laryngoscopy    Blade:  Hayden 3    Laryngeal View Grade: Grade I - full view of cords      Difficult Airway Encountered?: No      Complications:  None    Airway Device:  Oral endotracheal tube    Airway Device Size:  7.0    Style/Cuff Inflation:  Cuffed (inflated to minimal occlusive pressure)    Tube secured:  22    Secured at:  The lips    Placement Verified By:  Capnometry    Complicating Factors:  None    Findings Post-Intubation:  BS equal bilateral and atraumatic/condition of teeth unchanged       Isotretinoin Counseling: Patient should get monthly blood tests, not donate blood, not drive at night if vision affected, not share medication, and not undergo elective surgery for 6 months after tx completed. Side effects reviewed, pt to contact office should one occur.

## 2023-12-14 NOTE — OP NOTE
[facility]  Orthopedic Surgery    12/14/2023    Zunilda Alvarez  [id number]      PREOPERATIVE DIAGNOSIS:   Acute medial meniscus tear, left, subsequent encounter [S83.968K]  2. Primary osteoarthritis of left knee [M17.12]    POSTOPERATIVE DIAGNOSIS:    1. Medial meniscus tear, left knee.  2. Lateral meniscus tear, left knee.    3. Grade 3 chondromalacia lateral compartment, left knee.    4. Grade 3/4 chondromalacia patellofemoral joint, left knee.    5. Plica left knee.      PROCEDURE:  1. Arthroscopic partial medial meniscectomy, left knee.  2. Arthroscopic partial lateral meniscectomy, left knee.    3. Arthroscopic chondroplasty lateral compartment, left knee.  4. Arthroscopic chondroplasty patellofemoral joint, left knee.  5. Arthroscopic plica excision left knee.      SURGEON: Ha Chawla D.O.    ASSISTANT: Orton Grinnell, CFA    ANESTHESIA:  General    BLOOD LOSS:  Less than 5 cc.    TOURNIQUET:  Nonapplicable.    DRAINS:  None.    PATHOLOGY:  Shavings.    COMPLICATION:  None.    INDICATIONS FOR PROCEDURE:   Ms Alvarez is a 57-year-old lady who  has had left knee pain which began when she was on vacation in June 2023, when she went to turn to stop a toddler from running through a door, she banged her knee into an air conditioning unit and twisted it.  She has had pain and swelling in her knee since.  Walking or bending her knee increases her symptoms while rest improves them.  As the day goes on the more she uses her knee she gets progressively swelling.  She has taken NSAIDs with help.   She had an arthroscopy of her left knee on 03/02/2023 where she was doing well until this most recent exacerbation.  Before her last arthroscopy she had 8 months of left knee pain which was exacerbated in late November 2022 after she was on a cruise and traversing a hill; she slipped and fell twisting her knee while she was in [place]. She can ambulate approximately 1 mile and climb 10-12 stairs.  She does  not use an ambulatory assistive device.    She elected to proceed with surgery after she failed conservative management and complications to include bleeding, infection, scarring, nerve/blood vessel/tendon damage, need for further surgery, failed surgery, failure to improve, stiffness, arthritis,, and possible recurrence were discussed.  She signed a consent.    PROCEDURE IN DETAIL:   The patient was brought to the operating room and was transferred to the operating bed where all bony prominences were well padded.  General anesthesia was then administered by the Anesthesiology Department.  After general anesthesia was administered a tourniquet was applied to the upper part of the patient's left lower extremity, this was not inflated throughout the procedure.  The patient's left leg was then prepped with chlorhexidine solution and draped in the normal sterile fashion.  After prepping and draping bony and soft tissue landmarks were palpated and inferolateral and inferomedial portal sites were marked on the patient's knee along with a superolateral portal site.  The portal sites were then anesthetized with a 50/50 mixture of lidocaine and Marcaine.  The patient's knee was then insufflated with irrigant solution.       Sharp incision was then made with a #11 blade followed by introduction with a blunt trocar with a cannula.  The trocar was removed and the camera was placed with inflow and outflow.  The patient's knee was then inspected in the normal fashion.  The superior pouch was inspected and there was plica of the superior pouch.  The patella and patellofemoral joint were inspected and there was grade 3/4 chondromalacia of the patellofemoral joint.  The medial shoulder was inspected and there was arthritic flaking of the cartilage on the medial shoulder.  The medial gutter was entered and no major abnormalities were noted.       The medial compartment was then entered and a medial portal site was established in  normal fashion with localization with an 18 gauge spinal needle, sharp incision with a #11 blade followed by expansion with a blunt trocar.  The medial compartment was then inspected and probed.  The medial meniscus was probed and found to have a parrot-beak type tear of the medial meniscus.  This was debrided to stable meniscus with a full radius shaver.  The chondral surfaces were inspected and probed and no major abnormalities were noted.       The intracondylar notch was then entered and the ACL and PCL were inspected and probed and no major abnormalities were noted.  They were stable with probing.         The lateral compartment was then entered and the lateral meniscus was inspected and probed there was a posterior horn tear of the lateral meniscus this was debrided to stable meniscus with a full radius shaver.  The chondral surfaces were inspected and probed and there was grade 3 chondromalacia of the femoral tibial articulation.  A chondroplasty was completed with a full-radius shaver.  The lateral gutter was then entered and no major abnormalities were noted.  The lateral shoulder was then inspected and no major abnormalities were noted.       A superolateral portal site was then established in normal fashion with localization with an 18 gauge spinal needle, sharp  incision with a#11 blade followed by expansion with a blunt trocar.  A chondroplasty was then completed on the patellofemoral joint with a full-radius shaver.  The plica was also excised with a full-radius shaver.  The patient's knee was then extensively irrigated then evacuated with suction.  The camera and cannula were removed from the patient's knee.      The incisions were then closed with nylon suture in a simple interrupted fashion.  They were then dressed with Adaptic, sterile gauze and an Ace wrap.  The patient was then awakened by anesthesia and transferred from the operating room to the recovery room in stable condition.  She tolerated  the procedures well without complication.

## 2023-12-14 NOTE — ANESTHESIA POSTPROCEDURE EVALUATION
Anesthesia Post Evaluation    Patient: Zunilda Alvarez    Procedure(s) Performed: Procedure(s) (LRB):  ARTHROSCOPY, KNEE (Left)  ARTHROSCOPY, KNEE, WITH MENISCECTOMY (Left)  ARTHROSCOPY, KNEE, WITH CHONDROPLASTY (Left)    Final Anesthesia Type: general      Patient location during evaluation: PACU  Patient participation: Yes- Able to Participate  Level of consciousness: awake and alert and oriented  Post-procedure vital signs: reviewed and stable  Pain management: adequate  Airway patency: patent    PONV status at discharge: No PONV  Anesthetic complications: no      Cardiovascular status: hemodynamically stable  Respiratory status: unassisted, spontaneous ventilation and room air  Hydration status: euvolemic  Follow-up not needed.              Vitals Value Taken Time   /62 12/14/23 1405   Temp 36.4 °C (97.5 °F) 12/14/23 1235   Pulse 71 12/14/23 1405   Resp 15 12/14/23 1405   SpO2 89 % 12/14/23 1407   Vitals shown include unvalidated device data.      Event Time   Out of Recovery 13:05:00         Pain/Jazlyn Score: Jazlyn Score: 9 (12/14/2023  1:20 PM)  Modified Jazlyn Score: 20 (12/14/2023  2:14 PM)

## 2023-12-14 NOTE — DISCHARGE INSTRUCTIONS

## 2023-12-14 NOTE — PLAN OF CARE
Instructed pt on Incentive Spirometer-how to use and reason for using. V/u. Pt states she has used this in the past. I.S. exercise completed x5 and tolerated 1000 ml.

## 2023-12-14 NOTE — DISCHARGE SUMMARY
[facility]  Discharge Note  Short Stay    Procedure(s) (LRB):  ARTHROSCOPY, KNEE (Left)  ARTHROSCOPY, KNEE, WITH MENISCECTOMY (Left)  ARTHROSCOPY, KNEE, WITH CHONDROPLASTY (Left)      OUTCOME: Patient tolerated treatment/procedure well without complication and is now ready for discharge.    DISPOSITION: Home or Self Care    FINAL DIAGNOSIS:    1. Medial meniscus tear, left knee.  2. Lateral meniscus tear, left knee.    3. Grade 3 chondromalacia lateral compartment, left knee.    4. Grade 3/4 chondromalacia patellofemoral joint, left knee.    5. Plica left knee.      FOLLOWUP: In clinic    DISCHARGE INSTRUCTIONS:    Discharge Procedure Orders   Diet Adult Regular     Keep surgical extremity elevated     Ice to affected area     Other restrictions (specify):   Order Comments: 1. Elevate and ice left knee.    2. May remove dressing in 3 days.  May shower after removal of dressing, do not soak in a tub.  Place Band-Aids over incisions after removal of dressing.  3. Beginning tonight start taking one 325 mg aspirin every day.    4. May walk with weight-bearing at tolerance to the left lower extremity.  5. May return to work on Monday 12/18/2023 with seated work only.     Notify your health care provider if you experience any of the following:  temperature >100.4      Remove dressing in 72 hours   Order Comments: May remove dressing in 3 days.  May shower after removal of dressing, do not soak in a tub.  Place Band-Aids over incisions after removal of dressing.     Shower on day dressing removed (No bath)   Order Comments: May remove dressing in 3 days.  May shower after removal of dressing, do not soak in a tub.  Place Band-Aids over incisions after removal of dressing.     Weight bearing restrictions (specify):   Order Comments: May walk with weight-bearing at tolerance to the left lower extremity.        TIME SPENT ON DISCHARGE: 20 minutes

## 2023-12-26 ENCOUNTER — PATIENT MESSAGE (OUTPATIENT)
Dept: GASTROENTEROLOGY | Facility: CLINIC | Age: 58
End: 2023-12-26
Payer: COMMERCIAL

## 2023-12-26 DIAGNOSIS — K21.9 GASTROESOPHAGEAL REFLUX DISEASE, UNSPECIFIED WHETHER ESOPHAGITIS PRESENT: Primary | ICD-10-CM

## 2023-12-27 ENCOUNTER — OFFICE VISIT (OUTPATIENT)
Dept: ORTHOPEDICS | Facility: CLINIC | Age: 58
End: 2023-12-27
Payer: COMMERCIAL

## 2023-12-27 VITALS — RESPIRATION RATE: 16 BRPM | BODY MASS INDEX: 38.91 KG/M2 | HEIGHT: 64 IN | WEIGHT: 227.94 LBS

## 2023-12-27 DIAGNOSIS — Z98.890 S/P LEFT KNEE ARTHROSCOPY: Primary | ICD-10-CM

## 2023-12-27 PROCEDURE — 99999 PR PBB SHADOW E&M-EST. PATIENT-LVL III: ICD-10-PCS | Mod: PBBFAC,,, | Performed by: ORTHOPAEDIC SURGERY

## 2023-12-27 PROCEDURE — 99024 POSTOP FOLLOW-UP VISIT: CPT | Mod: S$GLB,,, | Performed by: ORTHOPAEDIC SURGERY

## 2023-12-27 PROCEDURE — 99024 PR POST-OP FOLLOW-UP VISIT: ICD-10-PCS | Mod: S$GLB,,, | Performed by: ORTHOPAEDIC SURGERY

## 2023-12-27 PROCEDURE — 99999 PR PBB SHADOW E&M-EST. PATIENT-LVL III: CPT | Mod: PBBFAC,,, | Performed by: ORTHOPAEDIC SURGERY

## 2023-12-27 RX ORDER — OMEPRAZOLE 40 MG/1
40 CAPSULE, DELAYED RELEASE ORAL DAILY
Qty: 90 CAPSULE | Refills: 1 | Status: SHIPPED | OUTPATIENT
Start: 2023-12-27 | End: 2024-06-24

## 2023-12-27 NOTE — PROGRESS NOTES
Subjective:      Patient ID: Zunilda Alvarez is a 58 y.o. female.    Chief Complaint: Post-op Evaluation of the Left Knee      HPI:  Ms. Alvarez returned today for her 1st postop visit after an arthroscopic partial medial and lateral meniscectomy, chondroplasty patellofemoral joint and lateral compartment and plica excision, left knee, performed on 12/14/2023.  She states she is doing well and her pain has resolved in her knee.  She has been walking without pain.  She has had left knee pain since June 2023 when she was on vacation and when she went to turn to stop a toddler from running through a door, she banged her knee into an air conditioning unit and twisted it.     ROS:  New diagnosis/surgery/prescriptions since last office visit on 11/29/2023:  Arthroscopic partial medial and lateral meniscectomy, chondroplasty patellofemoral joint and lateral compartment and plica excision, left knee  Constitutional: Negative for chills and fever.   HENT:  Negative for congestion.    Eyes:  Negative for blurred vision and double vision.   Cardiovascular:  Negative for chest pain and cyanosis.   Respiratory:  Negative for cough and shortness of breath.    Endocrine: Negative for cold intolerance and polydipsia.   Hematologic/Lymphatic: Negative for adenopathy.   Skin:  Negative for flushing, itching and rash.   Musculoskeletal:  Positive for joint pain. Negative for gout.   Gastrointestinal:  Negative for constipation, diarrhea and heartburn.   Genitourinary:  Negative for nocturia.   Neurological:  Negative for headaches and seizures.   Psychiatric/Behavioral:  Negative for depression and substance abuse. The patient is not nervous/anxious.    Allergic/Immunologic: Positive for environmental allergies.       Objective:      Physical Exam:   General: AAOx3.  No acute distress  Vascular:  Pulses intact and equal bilaterally.  Capillary refill less than 3 seconds and equal bilaterally  Neurologic:  Pinprick and soft touch  intact and equal bilaterally  Integment:  Incisions well approximated with sutures in place  Extremity:  Knee:  Extension/flexion equal bilaterally 0/118 degrees.  No effusion either knee.  Nontender with motion both knees.  Nontender with palpation both knees.  Calves soft.  Homans negative.  Radiography:  No new x-rays done today.      Assessment:       Impression:     1. Arthroscopic partial medial and lateral meniscectomy with chondroplasty patellofemoral joint and medial compartment with plica excision, left knee         Plan:       1.  Discussed physical examination with the patient. Zunilda understands that she had an arthroscopy of her left knee and appears to be doing well.  2. Remove sutures and place Steri-Strips.    3. Offered to refer to physical therapy she declined.  4. Brace wear was discussed.    5. Any pain can be treated with over-the-counter medications dosed per box instructions.  6. Continue doing home exercises such as quadriceps and hamstring strengthening.  7. Follow up p.r.n..

## 2024-01-24 ENCOUNTER — HOSPITAL ENCOUNTER (OUTPATIENT)
Dept: RADIOLOGY | Facility: HOSPITAL | Age: 59
Discharge: HOME | End: 2024-01-24
Attending: NURSE PRACTITIONER
Payer: COMMERCIAL

## 2024-01-24 DIAGNOSIS — Z12.31 SCREENING MAMMOGRAM FOR BREAST CANCER: ICD-10-CM

## 2024-01-24 PROCEDURE — 77063 BREAST TOMOSYNTHESIS BI: CPT | Mod: 26,,, | Performed by: RADIOLOGY

## 2024-01-24 PROCEDURE — 77067 SCR MAMMO BI INCL CAD: CPT | Mod: TC

## 2024-01-24 PROCEDURE — 77067 SCR MAMMO BI INCL CAD: CPT | Mod: 26,,, | Performed by: RADIOLOGY

## 2024-02-02 ENCOUNTER — TELEPHONE (OUTPATIENT)
Dept: ORTHOPEDICS | Facility: CLINIC | Age: 59
End: 2024-02-02
Payer: COMMERCIAL

## 2024-02-02 NOTE — TELEPHONE ENCOUNTER
Returned call. The patient stated that she needed a document regarding her surgery printed. I stated I could print it and leave the document at the  in [place] or she could go to medical record at the hospital in [facility] and they could print the documents for her. She stated she was going to go to medical records at the hospital since she lived closer to the hospital.    ----- Message from Edison King MA sent at 2/2/2024  1:07 PM CST -----  Contact: patient  Patient stated she needs to speak to office about some of her short term disability ppw.    Call back number is [phone]

## 2024-03-28 ENCOUNTER — OFFICE VISIT (OUTPATIENT)
Dept: ORTHOPEDICS | Facility: CLINIC | Age: 59
End: 2024-03-28
Payer: COMMERCIAL

## 2024-03-28 ENCOUNTER — CLINICAL SUPPORT (OUTPATIENT)
Dept: REHABILITATION | Facility: HOSPITAL | Age: 59
End: 2024-03-28
Attending: ORTHOPAEDIC SURGERY
Payer: COMMERCIAL

## 2024-03-28 VITALS — HEIGHT: 64 IN | HEART RATE: 78 BPM | WEIGHT: 227.94 LBS | OXYGEN SATURATION: 99 % | BODY MASS INDEX: 38.91 KG/M2

## 2024-03-28 DIAGNOSIS — M25.562 CHRONIC PAIN OF LEFT KNEE: ICD-10-CM

## 2024-03-28 DIAGNOSIS — G89.29 CHRONIC PAIN OF LEFT KNEE: ICD-10-CM

## 2024-03-28 DIAGNOSIS — M25.562 CHRONIC PAIN OF LEFT KNEE: Primary | ICD-10-CM

## 2024-03-28 DIAGNOSIS — Z74.09 IMPAIRED FUNCTIONAL MOBILITY, BALANCE, GAIT, AND ENDURANCE: Primary | ICD-10-CM

## 2024-03-28 DIAGNOSIS — G89.29 CHRONIC PAIN OF LEFT KNEE: Primary | ICD-10-CM

## 2024-03-28 PROCEDURE — 3008F BODY MASS INDEX DOCD: CPT | Mod: S$GLB,,, | Performed by: ORTHOPAEDIC SURGERY

## 2024-03-28 PROCEDURE — 4010F ACE/ARB THERAPY RXD/TAKEN: CPT | Mod: S$GLB,,, | Performed by: ORTHOPAEDIC SURGERY

## 2024-03-28 PROCEDURE — 97161 PT EVAL LOW COMPLEX 20 MIN: CPT

## 2024-03-28 PROCEDURE — 3051F HG A1C>EQUAL 7.0%<8.0%: CPT | Mod: S$GLB,,, | Performed by: ORTHOPAEDIC SURGERY

## 2024-03-28 PROCEDURE — 99214 OFFICE O/P EST MOD 30 MIN: CPT | Mod: S$GLB,,, | Performed by: ORTHOPAEDIC SURGERY

## 2024-03-28 PROCEDURE — 97110 THERAPEUTIC EXERCISES: CPT

## 2024-03-28 PROCEDURE — 1159F MED LIST DOCD IN RCRD: CPT | Mod: S$GLB,,, | Performed by: ORTHOPAEDIC SURGERY

## 2024-03-28 PROCEDURE — 1160F RVW MEDS BY RX/DR IN RCRD: CPT | Mod: S$GLB,,, | Performed by: ORTHOPAEDIC SURGERY

## 2024-03-28 PROCEDURE — 99999 PR PBB SHADOW E&M-EST. PATIENT-LVL IV: CPT | Mod: PBBFAC,,, | Performed by: ORTHOPAEDIC SURGERY

## 2024-03-28 RX ORDER — BLOOD-GLUCOSE SENSOR
EACH MISCELLANEOUS
COMMUNITY
Start: 2024-02-22

## 2024-03-28 RX ORDER — MELOXICAM 15 MG/1
15 TABLET ORAL DAILY
Qty: 30 TABLET | Refills: 1 | Status: SHIPPED | OUTPATIENT
Start: 2024-03-28

## 2024-03-28 NOTE — PROGRESS NOTES
[facility]   Physical Therapy Initial Evaluation      Name: Zunilda Alvarez  Clinic Number: [id number]    Therapy Diagnosis: Impaired functional mobility, balance, gait, and endurance    Physician: Rashaad Pizarro,*    Physician Orders: PT Eval and Treat   Medical Diagnosis from Referral: chronic pain left knee  Evaluation Date: 3/28/2024  Authorization Period Expiration: 3/38/2025  Plan of Care Expiration: 6/28/2024  Progress Note Due: 5/9/24  Date of Surgery: 12/14/2023  Visit # / Visits authorized: 1/ 1   FOTO: 1/ 3    Precautions: Standard     Time In: 2:30  Time Out: 3:15  Total Billable Time: 45 minutes    Subjective     Date of onset: June, 2023    History of current condition - Zunilda has had left knee pain which began when she was on vacation in June, 2023, and when she went to turn to stop a toddler from running through a door she banged her knee into an air conditioning unit and twisted it.    Falls: no    Imaging: none:     Prior Therapy: yes  Social History: Zunilda lives alone in a 2 story apartment with 10 steps leading to the second story.  Occupation:   Prior Level of Function: independent  Current Level of Function: limited mobility    Pain:  Current 6/10, worst 10/10, best 5/10   Location: left knee    Description: Aching  Aggravating Factors: Walking, Getting out of bed/chair, and ascending/descending stairs  Easing Factors: ice, rest, and elevation    Patients goals: decrease pain     Medical History:   Past Medical History:   Diagnosis Date    Adrenal nodule 09/30/2019    Arthritis     Chondromalacia, left knee 03/02/2023    Depression     Diabetes mellitus type 2, insulin dependent     Diarrhea 09/20/2019    Family history of colon cancer 08/07/2019    Fatty liver 10/13/2020    Gastroesophageal reflux disease 09/10/2019    High cholesterol     Hypertension     Liver cyst 11/04/2021    Palpitations 05/05/2023    Sleep apnea      Sprain of medial collateral ligament of left knee 12/13/2022    Tear of meniscus of left knee 01/2023       Surgical History:   Zunilda Alvarez  has a past surgical history that includes Hysterectomy (1998); Hernia repair; Esophagogastroduodenoscopy (N/A, 09/10/2019); Colonoscopy (N/A, 09/10/2019); Arthroscopy of knee (Left, 03/02/2023); Knee arthroscopy w/ meniscectomy (Left, 03/02/2023); Arthroscopy of knee with arthroplasty of patellofemoral joint (Left, 03/02/2023); Knee arthroscopy w/ plica excision (Left, 03/02/2023); Upper gastrointestinal endoscopy; Arthroscopy of knee (Left, 12/14/2023); Knee arthroscopy w/ meniscectomy (Left, 12/14/2023); and Arthroscopic chondroplasty of knee joint (Left, 12/14/2023).    Medications:   Zunilda has a current medication list which includes the following prescription(s): aspirin, blood-glucose meter, cimetidine, colestipol, dapagliflozin propanediol, diclofenac sodium, escitalopram oxalate, flash glucose sensor, freestyle mainor 3 sensor, novolog flexpen u-100 insulin, insulin glargine-yfgn, lisinopril, lisinopril-hydrochlorothiazide, meloxicam, metformin, metoprolol succinate, omeprazole, pravastatin, and ozempic.    Allergies:   Review of patient's allergies indicates:  No Known Allergies     Objective        Observation: Patient ambulates with a slight antalgic gait.    Edema: none    Range of Motion:   Knee Left active flexion Left Passive flexion Left Active extension R passive extension    100 100 -2 -2       Lower Extremity Strength   Left Right   Knee extension: 3+/5 5/5   Knee flexion: 3+/5 5/5   Hip flexion: 4-/5 5/5   Hip extension:  4-/5 5/5   Hip abduction: 4-/5 5/5   Hip adduction: 4-/5 5/5   Hip IR 4-/5 5/5   Hip ER 4-/5 5/5   Ankle dorsiflexion: 4/5 5/5   Ankle plantarflexion: 4/5 5/5       Joint Mobility: Patellar restricted,     Palpation: minimal tenderness to palpation at patella      Sensation: impaired to light touch    PT Evaluation Completed:  Yes  Discussed Plan of Care with patient: Yes    Intake Outcome Measure for FOTO knee Survey    Therapist reviewed FOTO scores for Zunilda Alvarez on 3/28/2024.   FOTO report - see Media section or FOTO account episode details.    Intake Score: 47.5%         Treatment     Total Treatment time (time-based codes) separate from Evaluation: 30 minutes     Zunilda received the treatments listed below:      therapeutic exercises to develop strength, endurance, ROM, and flexibility for 30 minutes including:  Nustep Lvl 1 x 10 min  Quad sets x 10  LAQ's x 10    manual therapy techniques: Myofacial release were applied to the: left knee for 0 minutes, including:    therapeutic activities to improve functional performance for 0  minutes, including:      Patient Education and Home Exercises     Education provided:   - Role of PT, work on strength, knee pain    Written Home Exercises Provided:  No exercises given today. Will develop program over next few sessions .     Assessment     Zunilda is a 59 y.o. female referred to outpatient Physical Therapy with a medical diagnosis of chronic pain left knee. Patient presents with left knee pain, decreased knee strength.    Patient prognosis is Good.   Patient will benefit from skilled outpatient Physical Therapy to address the deficits stated above and in the chart below, provide patient /family education, and to maximize patientt's level of independence.     Plan of care discussed with patient: Yes  Patient's spiritual, cultural and educational needs considered and patient is agreeable to the plan of care and goals as stated below:     Anticipated Barriers for therapy: 0    Medical Necessity is demonstrated by the following  History  Co-morbidities and personal factors that may impact the plan of care [x] LOW: no personal factors / co-morbidities  [] MODERATE: 1-2 personal factors / co-morbidities  [] HIGH: 3+ personal factors / co-morbidities    Moderate / High Support  Documentation:   Co-morbidities affecting plan of care: n/a    Personal Factors:   N/a     Examination  Body Structures and Functions, activity limitations and participation restrictions that may impact the plan of care [x] LOW: addressing 1-2 elements  [] MODERATE: 3+ elements  [] HIGH: 4+ elements (please support below)    Moderate / High Support Documentation: n/a     Clinical Presentation [x] LOW: stable  [] MODERATE: Evolving  [] HIGH: Unstable     Decision Making/ Complexity Score: low       Goals:  Short Term Goals: 3 weeks   1.Compliant with HEP.  2.Reduction in pain to no more than 4/10 for improved ability to perform daily activities.  3.Increase left knee strength to 4-/5.  4.Increase left knee flexion to 110 degrees.  5.Increase left extension to 0 degrees.      Long Term Goals: 6 weeks   1.Independent with HEP.  2.Reduction in pain to no more than 2/10 for improved ability to perform daily activities.  3.Increase left knee strength to 4+/5.  4.Increase left knee flexion to 120 degrees.  5.FOTO score improved to <20%.  Plan     Plan of care Certification: 3/28/2024 to 6/28/2024.    Outpatient Physical Therapy 2 times weekly for 6 weeks to include the following interventions: Manual Therapy, Patient Education, Therapeutic Activities, and Therapeutic Exercise.     Jodi Cardoso PT        Physician's Signature: _________________________________________ Date: ________________

## 2024-03-28 NOTE — PROGRESS NOTES
Subjective:      Patient ID: Zunilda Alvarez is a 59 y.o. female.    Chief Complaint: Pain of the Left Knee    HPI  59-year-old female with a long history of ongoing left knee pain status post 2 2 previous arthroscopic surgeries.  These were performed by Dr. Marj gilbert.  She is complaining of pain was squatting stooping steps and stairs etc..  ROS      Objective:    Ortho Exam     Constitutional:   Patient is alert  and oriented in no acute distress  HEENT:  normocephalic atraumatic; PERRL EOMI  Neck:  Supple without adenopathy  Cardiovascular:  Normal rate and rhythm  Pulmonary:  Normal respiratory effort normal chest wall expansion  Abdominal:  Nonprotuberant nondistended  Musculoskeletal:  Patient has a minimally antalgic gait  She has well-healed multiple scope portals maybe a trace effusion  Adequate range of motion adequate motor strength she does have some patellofemoral crepitus and diffuse medial joint line tenderness  Neurological:  No focal defect; cranial nerves 2-12 grossly intact  Psychiatric/behavioral:  Mood and behavior normal           My Radiographs Findings:    No new radiographs  Assessment:       Encounter Diagnosis   Name Primary?    Chronic pain of left knee Yes         Plan:       I have discussed medical condition treatment options with her at length I have suggested we try some physical therapy and some anti-inflammatory medicines.  GI cardiac and renal precautions were discussed.  Follow up in 6 weeks if symptoms fail to improve sooner if any questions or problems.        Past Medical History:   Diagnosis Date    Adrenal nodule 09/30/2019    Arthritis     Chondromalacia, left knee 03/02/2023    Depression     Diabetes mellitus type 2, insulin dependent     Diarrhea 09/20/2019    Family history of colon cancer 08/07/2019    Fatty liver 10/13/2020    Gastroesophageal reflux disease 09/10/2019    High cholesterol     Hypertension     Liver cyst 11/04/2021    Palpitations 05/05/2023     Sleep apnea     Sprain of medial collateral ligament of left knee 12/13/2022    Tear of meniscus of left knee 01/2023     Past Surgical History:   Procedure Laterality Date    ARTHROSCOPIC CHONDROPLASTY OF KNEE JOINT Left 12/14/2023    Procedure: ARTHROSCOPY, KNEE, WITH CHONDROPLASTY;  Surgeon: Ha Chawla DO;  Location: [facility] OR;  Service: Orthopedics;  Laterality: Left;    ARTHROSCOPY OF KNEE Left 03/02/2023    Procedure: ARTHROSCOPY, KNEE;  Surgeon: Ha Chawla DO;  Location: [facility] OR;  Service: Orthopedics;  Laterality: Left;    ARTHROSCOPY OF KNEE Left 12/14/2023    Procedure: ARTHROSCOPY, KNEE;  Surgeon: Ha Chawla DO;  Location: [facility] OR;  Service: Orthopedics;  Laterality: Left;    ARTHROSCOPY OF KNEE WITH ARTHROPLASTY OF PATELLOFEMORAL JOINT Left 03/02/2023    Procedure: ARTHROSCOPY, KNEE, WITH PATELLOFEMORAL;  Surgeon: Ha Chawla DO;  Location: [facility] OR;  Service: Orthopedics;  Laterality: Left;  Chondroplasty    COLONOSCOPY N/A 09/10/2019    Procedure: COLONOSCOPY;  Surgeon: Carter Rice MD;  Location: [facility];  Service: Endoscopy;  Laterality: N/A;    ESOPHAGOGASTRODUODENOSCOPY N/A 09/10/2019    Procedure: EGD (ESOPHAGOGASTRODUODENOSCOPY);  Surgeon: Carter Rice MD;  Location: [facility];  Service: Endoscopy;  Laterality: N/A;    HERNIA REPAIR      HYSTERECTOMY  1998    KNEE ARTHROSCOPY W/ MENISCECTOMY Left 03/02/2023    Procedure: ARTHROSCOPY, KNEE, WITH MENISCECTOMY;  Surgeon: Ha Chawla DO;  Location: [facility] OR;  Service: Orthopedics;  Laterality: Left;  PARTIAL    KNEE ARTHROSCOPY W/ MENISCECTOMY Left 12/14/2023    Procedure: ARTHROSCOPY, KNEE, WITH MENISCECTOMY;  Surgeon: Ha Chawla DO;  Location: [facility] OR;  Service: Orthopedics;  Laterality: Left;    KNEE ARTHROSCOPY W/ PLICA EXCISION Left 03/02/2023    Procedure: EXCISION, PLICA, KNEE, ARTHROSCOPIC;  Surgeon: Ha Chawla DO;  Location: [facility] OR;  Service: Orthopedics;  Laterality: Left;    UPPER  GASTROINTESTINAL ENDOSCOPY           Current Outpatient Medications:     aspirin (ECOTRIN) 81 MG EC tablet, Take 81 mg by mouth once daily., Disp: , Rfl:     blood-glucose meter kit, TID testing of blood sugar Type II DM with long term use of insulin, Disp: 1 each, Rfl: 0    cimetidine (TAGAMET) 400 MG tablet, Take 1 tablet by mouth nightly, Disp: 90 tablet, Rfl: 3    dapagliflozin (FARXIGA) 10 mg tablet, Take 1 tablet (10 mg total) by mouth once daily., Disp: 90 tablet, Rfl: 3    diclofenac sodium (VOLTAREN) 1 % Gel, Apply 2 g topically 4 (four) times daily., Disp: 100 g, Rfl: 1    flash glucose sensor Kit, 2 each by Misc.(Non-Drug; Combo Route) route every 14 (fourteen) days., Disp: 2 kit, Rfl: 11    FREESTYLE JOHNATHON 3 SENSOR Cecy, SMARTSI Topical, Disp: , Rfl:     insulin aspart U-100 (NOVOLOG FLEXPEN U-100 INSULIN) 100 unit/mL (3 mL) InPn pen, INJECT 18 UNITS SUBCUTANEOUSLY THREE TIMES DAILY (Patient taking differently: Inject into the skin 3 (three) times daily 10 units in the am, 15 at lunch and supper), Disp: 15 mL, Rfl: 5    insulin glargine-yfgn 100 unit/mL (3 mL) InPn, INJECT 70 UNITS SUBCUTANEOUSLY IN THE EVENING, Disp: 15 mL, Rfl: 3    lisinopriL 10 MG tablet, Take 10 mg by mouth., Disp: , Rfl:     lisinopril-hydrochlorothiazide (PRINZIDE,ZESTORETIC) 10-12.5 mg per tablet, Take 1 tablet by mouth once daily., Disp: , Rfl:     metFORMIN (GLUCOPHAGE) 1000 MG tablet, TAKE 1 TABLET BY MOUTH TWICE DAILY WITH MEALS, Disp: 180 tablet, Rfl: 2    metoprolol succinate (TOPROL-XL) 100 MG 24 hr tablet, Take 100 mg by mouth 2 (two) times daily., Disp: , Rfl:     omeprazole (PRILOSEC) 40 MG capsule, Take 1 capsule (40 mg total) by mouth Daily., Disp: 90 capsule, Rfl: 1    pravastatin (PRAVACHOL) 20 MG tablet, Take 20 mg by mouth once daily., Disp: , Rfl:     semaglutide (OZEMPIC) 2 mg/dose (8 mg/3 mL) PnIj, INJECT 2 MG SUBCUTANEOUSLY EVERY 7 DAYS, Disp: 3 mL, Rfl: 11    colestipoL (COLESTID) 1 gram Tab, TAKE ONE  TABLET BY MOUTH ONCE DAILY (Patient not taking: Reported on 3/28/2024), Disp: 90 tablet, Rfl: 3    escitalopram oxalate (LEXAPRO) 10 MG tablet, Take 20 mg by mouth once daily., Disp: , Rfl: 11    Review of patient's allergies indicates:  No Known Allergies    Family History   Problem Relation Age of Onset    COPD Mother     Diabetes Mother     Hypertension Mother     Colon cancer Mother     Dementia Mother     Heart disease Father     Hypertension Father     COPD Father     Colon cancer Maternal Uncle     Breast cancer Daughter     Ovarian cancer Neg Hx     Crohn's disease Neg Hx     Esophageal cancer Neg Hx     Liver cancer Neg Hx     Stomach cancer Neg Hx     Rectal cancer Neg Hx     Ulcerative colitis Neg Hx      Social History     Occupational History    Not on file   Tobacco Use    Smoking status: Never     Passive exposure: Past    Smokeless tobacco: Never   Substance and Sexual Activity    Alcohol use: Yes     Comment: rarely    Drug use: No    Sexual activity: Not on file

## 2024-04-01 ENCOUNTER — CLINICAL SUPPORT (OUTPATIENT)
Dept: REHABILITATION | Facility: HOSPITAL | Age: 59
End: 2024-04-01
Attending: ORTHOPAEDIC SURGERY
Payer: COMMERCIAL

## 2024-04-01 DIAGNOSIS — Z74.09 IMPAIRED FUNCTIONAL MOBILITY, BALANCE, GAIT, AND ENDURANCE: Primary | ICD-10-CM

## 2024-04-01 PROCEDURE — 97530 THERAPEUTIC ACTIVITIES: CPT

## 2024-04-01 PROCEDURE — 97110 THERAPEUTIC EXERCISES: CPT

## 2024-04-01 NOTE — PROGRESS NOTES
"[facility]   Physical Therapy Treatment Note      Name: Zunilda Alvarez  Clinic Number: [id number]    Therapy Diagnosis: Impaired functional mobility, balance, gait, and endurance  Physician: Rashaad Pizarro,*    Visit Date: 4/1/2024    Physician Orders: PT Eval and Treat   Medical Diagnosis from Referral: chronic pain left knee  Evaluation Date: 3/28/2024  Authorization Period Expiration: 3/38/2025  Plan of Care Expiration: 6/28/2024  Progress Note Due: 5/9/24  Date of Surgery: 12/14/2023  Visit # / Visits authorized: 3/12  FOTO: 1/ 3     Precautions: Standard      Time In: 1:00  Time Out: 1:55  Total Billable Time: 55 minutes    PTA Visit #: 0/5       Subjective     Patient reports: she has increased left knee pain today.  She was compliant with home exercise program.  Response to previous treatment: good  Functional change: none    Pain: 7/10  Location: left knee      Objective      Objective Measures updated at progress report unless specified.     Treatment     Zunilda received the treatments listed below:      therapeutic exercises to develop strength, endurance, ROM, and flexibility for 40 minutes including:  Nustep Lvl 1 x 12 min  Quad sets x 15  SAQ's 2# wt x 15  SLR 2# wt x 10  Hip abd SL x 15  LAQ's x 10  Knee flex RTB x 15  Heelcord stretch on wedge 3 x 30"      therapeutic activities to improve functional performance for 15  minutes, including:  Step ups x 15  Lateral step ups x 15  Mini-squats x 15  Toe taps x 15  Hamstring curls x 15  Hip abd x 15    Patient Education and Home Exercises       Education provided:   - quad sets, SLR    Written Home Exercises Provided: yes. Exercises were reviewed and Zunilda was able to demonstrate them prior to the end of the session.  Zunilda demonstrated good  understanding of the education provided. See Electronic Medical Record under Patient Instructions for exercises provided during therapy sessions    Assessment     Zunilda is a " 59 y.o. female referred to outpatient Physical Therapy with a medical diagnosis of chronic pain left knee. Patient presents with left knee pain, decreased knee strength.     Zunilda Is progressing well towards her goals.   Patient prognosis is Good.     Patient will continue to benefit from skilled outpatient physical therapy to address the deficits listed in the problem list box on initial evaluation, provide pt/family education and to maximize pt's level of independence in the home and community environment.     Patient's spiritual, cultural and educational needs considered and pt agreeable to plan of care and goals.     Anticipated barriers to physical therapy: 0    Goals: Short Term Goals: 3 weeks   1.Compliant with HEP.  2.Reduction in pain to no more than 4/10 for improved ability to perform daily activities.  3.Increase left knee strength to 4-/5.  4.Increase left knee flexion to 110 degrees.  5.Increase left extension to 0 degrees.        Long Term Goals: 6 weeks   1.Independent with HEP.  2.Reduction in pain to no more than 2/10 for improved ability to perform daily activities.  3.Increase left knee strength to 4+/5.  4.Increase left knee flexion to 120 degrees.  5.FOTO score improved to <20%.    Plan     Plan of care Certification: 3/28/2024 to 6/28/2024.     Outpatient Physical Therapy 2 times weekly for 6 weeks to include the following interventions: Manual Therapy, Patient Education, Therapeutic Activities, and Therapeutic Exercise.     Jodi Cardoso, PT

## 2024-04-05 ENCOUNTER — CLINICAL SUPPORT (OUTPATIENT)
Dept: REHABILITATION | Facility: HOSPITAL | Age: 59
End: 2024-04-05
Attending: ORTHOPAEDIC SURGERY
Payer: COMMERCIAL

## 2024-04-05 DIAGNOSIS — Z74.09 IMPAIRED FUNCTIONAL MOBILITY, BALANCE, GAIT, AND ENDURANCE: Primary | ICD-10-CM

## 2024-04-05 PROCEDURE — 97110 THERAPEUTIC EXERCISES: CPT

## 2024-04-05 PROCEDURE — 97530 THERAPEUTIC ACTIVITIES: CPT

## 2024-04-05 NOTE — PROGRESS NOTES
"[facility]   Physical Therapy Treatment Note      Name: Zunilda Alvarez  Clinic Number: [id number]    Therapy Diagnosis: Impaired functional mobility, balance, gait, and endurance  Physician: Rashaad Pizarro,*    Visit Date: 4/5/2024    Physician Orders: PT Eval and Treat   Medical Diagnosis from Referral: chronic pain left knee  Evaluation Date: 3/28/2024  Authorization Period Expiration: 3/38/2025  Plan of Care Expiration: 6/28/2024  Progress Note Due: 5/9/24  Date of Surgery: 12/14/2023  Visit # / Visits authorized: 4/12  FOTO: 1/ 3     Precautions: Standard      Time In: 9:00  Time Out: 9:55  Total Billable Time: 55 minutes    PTA Visit #: 0/5       Subjective     Patient reports: her doing ok this morning.  She was compliant with home exercise program.  Response to previous treatment: good  Functional change: none    Pain: 5/10  Location: left knee      Objective      Objective Measures updated at progress report unless specified.     Treatment     Zunilda received the treatments listed below:      therapeutic exercises to develop strength, endurance, ROM, and flexibility for 40 minutes including:  Nustep Lvl 2 x 12 min  Quad sets x 15  SAQ's 2# wt x 15  SLR 2# wt x 10  Hip abd SL x 15  LAQ's 2# wt x 10  Knee flex RTB x 15  Heelcord stretch on wedge 3 x 30"      therapeutic activities to improve functional performance for 15  minutes, including:  Step ups x 15  Lateral step ups x 15  Mini-squats x 15  Toe taps x 15  Hamstring curls x 15  Hip abd x 15    Patient Education and Home Exercises       Education provided:   - quad sets, SLR    Written Home Exercises Provided: yes. Exercises were reviewed and Zunilda was able to demonstrate them prior to the end of the session.  Zunilda demonstrated good  understanding of the education provided. See Electronic Medical Record under Patient Instructions for exercises provided during therapy sessions    Assessment     Zunilda is a 59 y.o. " female referred to outpatient Physical Therapy with a medical diagnosis of chronic pain left knee. Patient presents with left knee pain, decreased knee strength.     Zunilda Is progressing well towards her goals.   Patient prognosis is Good.     Patient will continue to benefit from skilled outpatient physical therapy to address the deficits listed in the problem list box on initial evaluation, provide pt/family education and to maximize pt's level of independence in the home and community environment.     Patient's spiritual, cultural and educational needs considered and pt agreeable to plan of care and goals.     Anticipated barriers to physical therapy: 0    Goals: Short Term Goals: 3 weeks   1.Compliant with HEP.  2.Reduction in pain to no more than 4/10 for improved ability to perform daily activities.  3.Increase left knee strength to 4-/5.  4.Increase left knee flexion to 110 degrees.  5.Increase left extension to 0 degrees.        Long Term Goals: 6 weeks   1.Independent with HEP.  2.Reduction in pain to no more than 2/10 for improved ability to perform daily activities.  3.Increase left knee strength to 4+/5.  4.Increase left knee flexion to 120 degrees.  5.FOTO score improved to <20%.    Plan     Plan of care Certification: 3/28/2024 to 6/28/2024.     Outpatient Physical Therapy 2 times weekly for 6 weeks to include the following interventions: Manual Therapy, Patient Education, Therapeutic Activities, and Therapeutic Exercise.     Jodi Cardoso, PT

## 2024-04-08 ENCOUNTER — CLINICAL SUPPORT (OUTPATIENT)
Dept: REHABILITATION | Facility: HOSPITAL | Age: 59
End: 2024-04-08
Attending: ORTHOPAEDIC SURGERY
Payer: COMMERCIAL

## 2024-04-08 DIAGNOSIS — Z74.09 IMPAIRED FUNCTIONAL MOBILITY, BALANCE, GAIT, AND ENDURANCE: Primary | ICD-10-CM

## 2024-04-08 PROCEDURE — 97530 THERAPEUTIC ACTIVITIES: CPT

## 2024-04-08 PROCEDURE — 97110 THERAPEUTIC EXERCISES: CPT

## 2024-04-08 NOTE — PROGRESS NOTES
"[facility]   Physical Therapy Treatment Note      Name: Zunilda Alvarez  Clinic Number: [id number]    Therapy Diagnosis: Impaired functional mobility, balance, gait, and endurance  Physician: Rashaad Pizarro,*    Visit Date: 4/8/2024    Physician Orders: PT Eval and Treat   Medical Diagnosis from Referral: chronic pain left knee  Evaluation Date: 3/28/2024  Authorization Period Expiration: 3/38/2025  Plan of Care Expiration: 6/28/2024  Progress Note Due: 5/9/24  Date of Surgery: 12/14/2023  Visit # / Visits authorized: 4/12  FOTO: 1/ 3     Precautions: Standard      Time In: 2:00  Time Out: 2:55  Total Billable Time: 55 minutes    PTA Visit #: 0/5       Subjective     Patient reports: she is going pretty good this afternoon.  She was compliant with home exercise program.  Response to previous treatment: good  Functional change: none    Pain: 5/10  Location: left knee      Objective      Objective Measures updated at progress report unless specified.     Treatment     Zunilda received the treatments listed below:      therapeutic exercises to develop strength, endurance, ROM, and flexibility for 40 minutes including:  Nustep Lvl 2 x 12 min  Quad sets x 15  SAQ's 2.5# wt x 15  SLR 2.5# wt x 10  Hip abd SL 1# wt x 15  LAQ's 2# wt x 10  Knee flex RTB x 15  Heelcord stretch on wedge 3 x 30"  Hamstring stretch long seat 3 x 30"      therapeutic activities to improve functional performance for 15  minutes, including:  Forward step ups x 15  Lateral step ups x 15  Mini-squats x 15  Toe taps x 15  Hamstring curls x 15  Hip abd x 15  Hip extension x 15    Patient Education and Home Exercises       Education provided:   - quad sets, SLR    Written Home Exercises Provided: yes. Exercises were reviewed and Zunilda was able to demonstrate them prior to the end of the session.  Zunilda demonstrated good  understanding of the education provided. See Electronic Medical Record under Patient " Instructions for exercises provided during therapy sessions    Assessment     Zunilda is a 59 y.o. female referred to outpatient Physical Therapy with a medical diagnosis of chronic pain left knee. Patient presents with left knee pain, decreased knee strength.     Zunilda Is progressing well towards her goals.   Patient prognosis is Good.     Patient will continue to benefit from skilled outpatient physical therapy to address the deficits listed in the problem list box on initial evaluation, provide pt/family education and to maximize pt's level of independence in the home and community environment.     Patient's spiritual, cultural and educational needs considered and pt agreeable to plan of care and goals.     Anticipated barriers to physical therapy: 0    Goals: Short Term Goals: 3 weeks   1.Compliant with HEP.  2.Reduction in pain to no more than 4/10 for improved ability to perform daily activities.  3.Increase left knee strength to 4-/5.  4.Increase left knee flexion to 110 degrees.  5.Increase left extension to 0 degrees.        Long Term Goals: 6 weeks   1.Independent with HEP.  2.Reduction in pain to no more than 2/10 for improved ability to perform daily activities.  3.Increase left knee strength to 4+/5.  4.Increase left knee flexion to 120 degrees.  5.FOTO score improved to <20%.    Plan     Plan of care Certification: 3/28/2024 to 6/28/2024.     Outpatient Physical Therapy 2 times weekly for 6 weeks to include the following interventions: Manual Therapy, Patient Education, Therapeutic Activities, and Therapeutic Exercise.     Jodi Cardoso, PT

## 2024-04-11 ENCOUNTER — CLINICAL SUPPORT (OUTPATIENT)
Dept: REHABILITATION | Facility: HOSPITAL | Age: 59
End: 2024-04-11
Attending: ORTHOPAEDIC SURGERY
Payer: COMMERCIAL

## 2024-04-11 DIAGNOSIS — Z74.09 IMPAIRED FUNCTIONAL MOBILITY, BALANCE, GAIT, AND ENDURANCE: Primary | ICD-10-CM

## 2024-04-11 PROCEDURE — 97530 THERAPEUTIC ACTIVITIES: CPT

## 2024-04-11 PROCEDURE — 97110 THERAPEUTIC EXERCISES: CPT

## 2024-04-11 NOTE — PROGRESS NOTES
"[facility]   Physical Therapy Treatment Note      Name: Zunilda Alvarez  Clinic Number: [id number]    Therapy Diagnosis: Impaired functional mobility, balance, gait, and endurance  Physician: Rashaad Pizarro,*    Visit Date: 4/11/2024    Physician Orders: PT Eval and Treat   Medical Diagnosis from Referral: chronic pain left knee  Evaluation Date: 3/28/2024  Authorization Period Expiration: 3/38/2025  Plan of Care Expiration: 6/28/2024  Progress Note Due: 5/9/24  Date of Surgery: 12/14/2023  Visit # / Visits authorized: 5/12  FOTO: 1/ 3     Precautions: Standard      Time In: 1:00  Time Out: 1:55  Total Billable Time: 55 minutes    PTA Visit #: 0/5       Subjective     Patient reports: she is going pretty good this afternoon.  She was compliant with home exercise program.  Response to previous treatment: good  Functional change: none    Pain: 6/10  Location: left knee      Objective      Objective Measures updated at progress report unless specified.     Treatment     Zunilda received the treatments listed below:      therapeutic exercises to develop strength, endurance, ROM, and flexibility for 40 minutes including:  Nustep Lvl 2 x 12 min  Quad sets x 15  SAQ's 2.5# wt x 15  SLR 2.5# wt x 10  Hip abd SL 1# wt x 15  LAQ's 2# wt x 10  Knee flex RTB x 15  Heelcord stretch on wedge 3 x 30"  Hamstring stretch long seat 3 x 30"      therapeutic activities to improve functional performance for 15  minutes, including:  Forward step ups x 15  Lateral step ups x 15  Mini-squats x 15  Toe taps x 15  Hamstring curls x 15  Hip abd x 15  Hip extension x 15    Patient Education and Home Exercises       Education provided:   - quad sets, SLR    Written Home Exercises Provided: yes. Exercises were reviewed and Zunilda was able to demonstrate them prior to the end of the session.  Zunilda demonstrated good  understanding of the education provided. See Electronic Medical Record under Patient " Instructions for exercises provided during therapy sessions    Assessment     Zunilda is a 59 y.o. female referred to outpatient Physical Therapy with a medical diagnosis of chronic pain left knee. Patient presents with left knee pain, decreased knee strength.     Zunilda Is progressing well towards her goals.   Patient prognosis is Good.     Patient will continue to benefit from skilled outpatient physical therapy to address the deficits listed in the problem list box on initial evaluation, provide pt/family education and to maximize pt's level of independence in the home and community environment.     Patient's spiritual, cultural and educational needs considered and pt agreeable to plan of care and goals.     Anticipated barriers to physical therapy: 0    Goals: Short Term Goals: 3 weeks   1.Compliant with HEP.  2.Reduction in pain to no more than 4/10 for improved ability to perform daily activities.  3.Increase left knee strength to 4-/5.  4.Increase left knee flexion to 110 degrees.  5.Increase left extension to 0 degrees.        Long Term Goals: 6 weeks   1.Independent with HEP.  2.Reduction in pain to no more than 2/10 for improved ability to perform daily activities.  3.Increase left knee strength to 4+/5.  4.Increase left knee flexion to 120 degrees.  5.FOTO score improved to <20%.    Plan     Plan of care Certification: 3/28/2024 to 6/28/2024.     Outpatient Physical Therapy 2 times weekly for 6 weeks to include the following interventions: Manual Therapy, Patient Education, Therapeutic Activities, and Therapeutic Exercise.     Jodi Cardoso, PT

## 2024-04-15 ENCOUNTER — CLINICAL SUPPORT (OUTPATIENT)
Dept: REHABILITATION | Facility: HOSPITAL | Age: 59
End: 2024-04-15
Attending: ORTHOPAEDIC SURGERY
Payer: COMMERCIAL

## 2024-04-15 DIAGNOSIS — Z74.09 IMPAIRED FUNCTIONAL MOBILITY, BALANCE, GAIT, AND ENDURANCE: Primary | ICD-10-CM

## 2024-04-15 PROCEDURE — 97530 THERAPEUTIC ACTIVITIES: CPT

## 2024-04-15 PROCEDURE — 97110 THERAPEUTIC EXERCISES: CPT

## 2024-04-15 NOTE — PROGRESS NOTES
"[facility]   Physical Therapy Treatment Note      Name: Zunilda Alvarez  Clinic Number: [id number]    Therapy Diagnosis: Impaired functional mobility, balance, gait, and endurance  Physician: Rashaad Pizarro,*    Visit Date: 4/15/2024    Physician Orders: PT Eval and Treat   Medical Diagnosis from Referral: chronic pain left knee  Evaluation Date: 3/28/2024  Authorization Period Expiration: 3/38/2025  Plan of Care Expiration: 6/28/2024  Progress Note Due: 5/9/24  Date of Surgery: 12/14/2023  Visit # / Visits authorized: 6/12  FOTO: 1/ 3     Precautions: Standard      Time In: 1:00  Time Out: 1:55  Total Billable Time: 55 minutes    PTA Visit #: 0/5       Subjective     Patient reports: she is going pretty good this afternoon.  She was compliant with home exercise program.  Response to previous treatment: good  Functional change: none    Pain: 5/10  Location: left knee      Objective      Objective Measures updated at progress report unless specified.     Treatment     Zunilda received the treatments listed below:      therapeutic exercises to develop strength, endurance, ROM, and flexibility for 40 minutes including:  Nustep Lvl 2 x 12 min  Quad sets x 15  SAQ's 2.5# wt x 15  SLR 2.5# wt x 10  Hip abd SL 1# wt x 15  LAQ's 2# wt x 10  Knee flex RTB x 15  Heelcord stretch on wedge 3 x 30"  Hamstring stretch long seat 3 x 30"      therapeutic activities to improve functional performance for 15  minutes, including:  Forward step ups x 15  Lateral step ups x 15  Mini-squats x 15  Toe taps x 15  Hamstring curls x 15  Hip abd x 15  Hip extension x 15    Patient Education and Home Exercises       Education provided:   - quad sets, SLR    Written Home Exercises Provided: yes. Exercises were reviewed and Zunilda was able to demonstrate them prior to the end of the session.  Zunilda demonstrated good  understanding of the education provided. See Electronic Medical Record under Patient " Instructions for exercises provided during therapy sessions    Assessment     Zunilda is a 59 y.o. female referred to outpatient Physical Therapy with a medical diagnosis of chronic pain left knee. Patient presents with left knee pain, decreased knee strength.     Zunilda Is progressing well towards her goals.   Patient prognosis is Good.     Patient will continue to benefit from skilled outpatient physical therapy to address the deficits listed in the problem list box on initial evaluation, provide pt/family education and to maximize pt's level of independence in the home and community environment.     Patient's spiritual, cultural and educational needs considered and pt agreeable to plan of care and goals.     Anticipated barriers to physical therapy: 0    Goals: Short Term Goals: 3 weeks   1.Compliant with HEP.  2.Reduction in pain to no more than 4/10 for improved ability to perform daily activities.  3.Increase left knee strength to 4-/5.  4.Increase left knee flexion to 110 degrees.  5.Increase left extension to 0 degrees.        Long Term Goals: 6 weeks   1.Independent with HEP.  2.Reduction in pain to no more than 2/10 for improved ability to perform daily activities.  3.Increase left knee strength to 4+/5.  4.Increase left knee flexion to 120 degrees.  5.FOTO score improved to <20%.    Plan     Plan of care Certification: 3/28/2024 to 6/28/2024.     Outpatient Physical Therapy 2 times weekly for 6 weeks to include the following interventions: Manual Therapy, Patient Education, Therapeutic Activities, and Therapeutic Exercise.     Jodi Cardoso, PT

## 2024-04-24 ENCOUNTER — CLINICAL SUPPORT (OUTPATIENT)
Dept: REHABILITATION | Facility: HOSPITAL | Age: 59
End: 2024-04-24
Payer: COMMERCIAL

## 2024-04-24 ENCOUNTER — TELEPHONE (OUTPATIENT)
Dept: ORTHOPEDICS | Facility: CLINIC | Age: 59
End: 2024-04-24
Payer: COMMERCIAL

## 2024-04-24 DIAGNOSIS — Z74.09 IMPAIRED FUNCTIONAL MOBILITY, BALANCE, GAIT, AND ENDURANCE: Primary | ICD-10-CM

## 2024-04-24 PROCEDURE — 97110 THERAPEUTIC EXERCISES: CPT

## 2024-04-24 PROCEDURE — 97530 THERAPEUTIC ACTIVITIES: CPT

## 2024-04-24 NOTE — PROGRESS NOTES
"[facility]   Physical Therapy Treatment Note      Name: Zunilda Alvarez  Clinic Number: [id number]    Therapy Diagnosis: Impaired functional mobility, balance, gait, and endurance  Physician: Rashaad Pizarro,*    Visit Date: 4/24/2024    Physician Orders: PT Eval and Treat   Medical Diagnosis from Referral: chronic pain left knee  Evaluation Date: 3/28/2024  Authorization Period Expiration: 3/38/2025  Plan of Care Expiration: 6/28/2024  Progress Note Due: 5/9/24  Date of Surgery: 12/14/2023  Visit # / Visits authorized: 7/12  FOTO: 1/ 3     Precautions: Standard      Time In: 7:45  Time Out:  8:40  Total Billable Time: 55 minutes    PTA Visit #: 0/5       Subjective     Patient reports: she is doing well this morning.  She was compliant with home exercise program.  Response to previous treatment: good  Functional change: none    Pain: 5/10  Location: left knee      Objective      Objective Measures updated at progress report unless specified.     Treatment     Zunilda received the treatments listed below:      therapeutic exercises to develop strength, endurance, ROM, and flexibility for 40 minutes including:  Nustep Lvl 2 x 12 min  Quad sets x 15  SAQ's 2.5# wt x 15  SLR 2.5# wt x 10  Hip abd SL 1# wt x 15  LAQ's 2# wt x 10  Knee flex RTB x 15  Heelcord stretch on wedge 3 x 30"  Hamstring stretch long seat 3 x 30"      therapeutic activities to improve functional performance for 15  minutes, including:  Forward step ups x 15  Lateral step ups x 15  Mini-squats x 15  Toe taps x 15  Hamstring curls x 15  Hip abd x 15  Hip extension x 15    Patient Education and Home Exercises       Education provided:   - quad sets, SLR    Written Home Exercises Provided: yes. Exercises were reviewed and Zunilda was able to demonstrate them prior to the end of the session.  Zunilda demonstrated good  understanding of the education provided. See Electronic Medical Record under Patient Instructions " for exercises provided during therapy sessions    Assessment     Zunilda is a 59 y.o. female referred to outpatient Physical Therapy with a medical diagnosis of chronic pain left knee. Patient presents with left knee pain, decreased knee strength.     Zunilda Is progressing well towards her goals.   Patient prognosis is Good.     Patient will continue to benefit from skilled outpatient physical therapy to address the deficits listed in the problem list box on initial evaluation, provide pt/family education and to maximize pt's level of independence in the home and community environment.     Patient's spiritual, cultural and educational needs considered and pt agreeable to plan of care and goals.     Anticipated barriers to physical therapy: 0    Goals: Short Term Goals: 3 weeks   1.Compliant with HEP.  2.Reduction in pain to no more than 4/10 for improved ability to perform daily activities.  3.Increase left knee strength to 4-/5.  4.Increase left knee flexion to 110 degrees.  5.Increase left extension to 0 degrees.        Long Term Goals: 6 weeks   1.Independent with HEP.  2.Reduction in pain to no more than 2/10 for improved ability to perform daily activities.  3.Increase left knee strength to 4+/5.  4.Increase left knee flexion to 120 degrees.  5.FOTO score improved to <20%.    Plan     Plan of care Certification: 3/28/2024 to 6/28/2024.     Outpatient Physical Therapy 2 times weekly for 6 weeks to include the following interventions: Manual Therapy, Patient Education, Therapeutic Activities, and Therapeutic Exercise.     Jodi Cardoso, PT

## 2024-04-26 ENCOUNTER — TELEPHONE (OUTPATIENT)
Dept: ORTHOPEDICS | Facility: CLINIC | Age: 59
End: 2024-04-26
Payer: COMMERCIAL

## 2024-04-26 ENCOUNTER — CLINICAL SUPPORT (OUTPATIENT)
Dept: REHABILITATION | Facility: HOSPITAL | Age: 59
End: 2024-04-26
Payer: COMMERCIAL

## 2024-04-26 DIAGNOSIS — Z74.09 IMPAIRED FUNCTIONAL MOBILITY, BALANCE, GAIT, AND ENDURANCE: Primary | ICD-10-CM

## 2024-04-26 PROCEDURE — 97110 THERAPEUTIC EXERCISES: CPT

## 2024-04-26 PROCEDURE — 97530 THERAPEUTIC ACTIVITIES: CPT

## 2024-04-26 NOTE — TELEPHONE ENCOUNTER
Sent my chart message regarding 5/6/24 appointment needing to be rescheduled. Offered the make up clinic day of 5/7/24.

## 2024-04-26 NOTE — PROGRESS NOTES
"[facility]   Physical Therapy Treatment Note      Name: Zunilda Alvarez  Clinic Number: [id number]    Therapy Diagnosis: Impaired functional mobility, balance, gait, and endurance  Physician: Rashaad Pizarro,*    Visit Date: 4/26/2024    Physician Orders: PT Eval and Treat   Medical Diagnosis from Referral: chronic pain left knee  Evaluation Date: 3/28/2024  Authorization Period Expiration: 3/38/2025  Plan of Care Expiration: 6/28/2024  Progress Note Due: 5/9/24  Date of Surgery: 12/14/2023  Visit # / Visits authorized: 8/12  FOTO: 1/ 3     Precautions: Standard      Time In: 9:00  Time Out:  9:55  Total Billable Time: 55 minutes    PTA Visit #: 0/5       Subjective     Patient reports: she is doing well this morning.  She was compliant with home exercise program.  Response to previous treatment: good  Functional change: none    Pain: 5/10  Location: left knee      Objective      Objective Measures updated at progress report unless specified.     Treatment     Zunilda received the treatments listed below:      therapeutic exercises to develop strength, endurance, ROM, and flexibility for 40 minutes including:  Nustep Lvl 2 x 12 min  Quad sets x 15  SAQ's 2.5# wt x 15  SLR 2.5# wt x 10  Hip abd SL 1# wt x 15  LAQ's 2# wt x 10  Knee flex RTB x 15  Heelcord stretch on wedge 3 x 30"  Hamstring stretch long seat 3 x 30"      therapeutic activities to improve functional performance for 15  minutes, including:  Forward step ups x 15  Lateral step ups x 15  Mini-squats x 15  Toe taps x 15  Hamstring curls x 15  Hip abd x 15  Hip extension x 15    Patient Education and Home Exercises       Education provided:   - quad sets, SLR    Written Home Exercises Provided: yes. Exercises were reviewed and Zunilda was able to demonstrate them prior to the end of the session.  Zunilda demonstrated good  understanding of the education provided. See Electronic Medical Record under Patient Instructions " for exercises provided during therapy sessions    Assessment     Zunilda is a 59 y.o. female referred to outpatient Physical Therapy with a medical diagnosis of chronic pain left knee. Patient presents with left knee pain, decreased knee strength.     Zunilda Is progressing well towards her goals.   Patient prognosis is Good.     Patient will continue to benefit from skilled outpatient physical therapy to address the deficits listed in the problem list box on initial evaluation, provide pt/family education and to maximize pt's level of independence in the home and community environment.     Patient's spiritual, cultural and educational needs considered and pt agreeable to plan of care and goals.     Anticipated barriers to physical therapy: 0    Goals: Short Term Goals: 3 weeks   1.Compliant with HEP.  2.Reduction in pain to no more than 4/10 for improved ability to perform daily activities.  3.Increase left knee strength to 4-/5.  4.Increase left knee flexion to 110 degrees.  5.Increase left extension to 0 degrees.        Long Term Goals: 6 weeks   1.Independent with HEP.  2.Reduction in pain to no more than 2/10 for improved ability to perform daily activities.  3.Increase left knee strength to 4+/5.  4.Increase left knee flexion to 120 degrees.  5.FOTO score improved to <20%.    Plan     Plan of care Certification: 3/28/2024 to 6/28/2024.     Outpatient Physical Therapy 2 times weekly for 6 weeks to include the following interventions: Manual Therapy, Patient Education, Therapeutic Activities, and Therapeutic Exercise.     Jodi Cardoso, PT

## 2024-05-07 ENCOUNTER — OFFICE VISIT (OUTPATIENT)
Dept: ORTHOPEDICS | Facility: CLINIC | Age: 59
End: 2024-05-07
Payer: COMMERCIAL

## 2024-05-07 VITALS — HEART RATE: 84 BPM | BODY MASS INDEX: 38.91 KG/M2 | HEIGHT: 64 IN | OXYGEN SATURATION: 98 % | WEIGHT: 227.94 LBS

## 2024-05-07 DIAGNOSIS — G89.29 CHRONIC PAIN OF LEFT KNEE: Primary | ICD-10-CM

## 2024-05-07 DIAGNOSIS — M25.562 CHRONIC PAIN OF LEFT KNEE: Primary | ICD-10-CM

## 2024-05-07 PROCEDURE — 20610 DRAIN/INJ JOINT/BURSA W/O US: CPT | Mod: LT,S$GLB,, | Performed by: ORTHOPAEDIC SURGERY

## 2024-05-07 PROCEDURE — 4010F ACE/ARB THERAPY RXD/TAKEN: CPT | Mod: S$GLB,,, | Performed by: ORTHOPAEDIC SURGERY

## 2024-05-07 PROCEDURE — 1160F RVW MEDS BY RX/DR IN RCRD: CPT | Mod: S$GLB,,, | Performed by: ORTHOPAEDIC SURGERY

## 2024-05-07 PROCEDURE — 3008F BODY MASS INDEX DOCD: CPT | Mod: S$GLB,,, | Performed by: ORTHOPAEDIC SURGERY

## 2024-05-07 PROCEDURE — 99999 PR PBB SHADOW E&M-EST. PATIENT-LVL IV: CPT | Mod: PBBFAC,,, | Performed by: ORTHOPAEDIC SURGERY

## 2024-05-07 PROCEDURE — 3051F HG A1C>EQUAL 7.0%<8.0%: CPT | Mod: S$GLB,,, | Performed by: ORTHOPAEDIC SURGERY

## 2024-05-07 PROCEDURE — 1159F MED LIST DOCD IN RCRD: CPT | Mod: S$GLB,,, | Performed by: ORTHOPAEDIC SURGERY

## 2024-05-07 PROCEDURE — 99214 OFFICE O/P EST MOD 30 MIN: CPT | Mod: 25,S$GLB,, | Performed by: ORTHOPAEDIC SURGERY

## 2024-05-07 RX ORDER — TRIAMCINOLONE ACETONIDE 40 MG/ML
40 INJECTION, SUSPENSION INTRA-ARTICULAR; INTRAMUSCULAR
Status: DISCONTINUED | OUTPATIENT
Start: 2024-05-07 | End: 2024-05-07 | Stop reason: HOSPADM

## 2024-05-07 RX ADMIN — TRIAMCINOLONE ACETONIDE 40 MG: 40 INJECTION, SUSPENSION INTRA-ARTICULAR; INTRAMUSCULAR at 08:05

## 2024-05-07 NOTE — PROCEDURES
Large Joint Aspiration/Injection: L knee    Date/Time: 5/7/2024 8:00 AM    Performed by: Rashaad Pizarro MD  Authorized by: Rashaad Pizarro MD    Consent Done?:  Yes (Verbal)  Indications:  Pain  Site marked: the procedure site was marked    Timeout: prior to procedure the correct patient, procedure, and site was verified    Local anesthetic:  Lidocaine 1% without epinephrine and bupivacaine 0.25% without epinephrine  Anesthetic total (ml):  6      Details:  Needle Size:  20 G  Approach:  Anterolateral  Location:  Knee  Site:  L knee  Medications:  40 mg triamcinolone acetonide 40 mg/mL  Patient tolerance:  Patient tolerated the procedure well with no immediate complications

## 2024-05-07 NOTE — PROGRESS NOTES
Subjective:      Patient ID: Zunilda Alvarez is a 59 y.o. female.    Chief Complaint: Pain of the Left Knee    HPI  Patient follow up with the ongoing left knee pain.  Only some slight improvement with the NSAIDs and physical therapy.  ROS      Objective:    Ortho Exam     Constitutional:   Patient is alert  and oriented in no acute distress  HEENT:  normocephalic atraumatic; PERRL EOMI  Neck:  Supple without adenopathy  Cardiovascular:  Normal rate and rhythm  Pulmonary:  Normal respiratory effort normal chest wall expansion  Abdominal:  Nonprotuberant nondistended  Musculoskeletal:  Patient continues to have a slightly antalgic gait  She has well-healed scope portals diffuse joint line tenderness with adequate range of motion  Neurological:  No focal defect; cranial nerves 2-12 grossly intact  Psychiatric/behavioral:  Mood and behavior normal         My Radiographs Findings:    No new radiographs  Assessment:       Encounter Diagnosis   Name Primary?    Chronic pain of left knee Yes         Plan:       I have discussed medical condition treatment options with her at length.  After a verbal consent and sterile prep I injected her left knee today without complication.  We will continue with the NSAIDs.  Follow up with me in 6 weeks I will see her sooner if any questions or problems.  If symptoms fail to improve may need to consider repeat MRI of the knee.        Past Medical History:   Diagnosis Date    Adrenal nodule 09/30/2019    Arthritis     Chondromalacia, left knee 03/02/2023    Depression     Diabetes mellitus type 2, insulin dependent     Diarrhea 09/20/2019    Family history of colon cancer 08/07/2019    Fatty liver 10/13/2020    Gastroesophageal reflux disease 09/10/2019    High cholesterol     Hypertension     Liver cyst 11/04/2021    Palpitations 05/05/2023    Sleep apnea     Sprain of medial collateral ligament of left knee 12/13/2022    Tear of meniscus of left knee 01/2023     Past Surgical  History:   Procedure Laterality Date    ARTHROSCOPIC CHONDROPLASTY OF KNEE JOINT Left 12/14/2023    Procedure: ARTHROSCOPY, KNEE, WITH CHONDROPLASTY;  Surgeon: Ha Chawla DO;  Location: [facility] OR;  Service: Orthopedics;  Laterality: Left;    ARTHROSCOPY OF KNEE Left 03/02/2023    Procedure: ARTHROSCOPY, KNEE;  Surgeon: Ha Chawla DO;  Location: [facility] OR;  Service: Orthopedics;  Laterality: Left;    ARTHROSCOPY OF KNEE Left 12/14/2023    Procedure: ARTHROSCOPY, KNEE;  Surgeon: Ha Chawla DO;  Location: [facility] OR;  Service: Orthopedics;  Laterality: Left;    ARTHROSCOPY OF KNEE WITH ARTHROPLASTY OF PATELLOFEMORAL JOINT Left 03/02/2023    Procedure: ARTHROSCOPY, KNEE, WITH PATELLOFEMORAL;  Surgeon: Ha Chawla DO;  Location: [facility] OR;  Service: Orthopedics;  Laterality: Left;  Chondroplasty    COLONOSCOPY N/A 09/10/2019    Procedure: COLONOSCOPY;  Surgeon: Carter Rice MD;  Location: [facility];  Service: Endoscopy;  Laterality: N/A;    ESOPHAGOGASTRODUODENOSCOPY N/A 09/10/2019    Procedure: EGD (ESOPHAGOGASTRODUODENOSCOPY);  Surgeon: Carter Rice MD;  Location: [facility];  Service: Endoscopy;  Laterality: N/A;    HERNIA REPAIR      HYSTERECTOMY  1998    KNEE ARTHROSCOPY W/ MENISCECTOMY Left 03/02/2023    Procedure: ARTHROSCOPY, KNEE, WITH MENISCECTOMY;  Surgeon: Ha Chawla DO;  Location: [facility] OR;  Service: Orthopedics;  Laterality: Left;  PARTIAL    KNEE ARTHROSCOPY W/ MENISCECTOMY Left 12/14/2023    Procedure: ARTHROSCOPY, KNEE, WITH MENISCECTOMY;  Surgeon: Ha Chawla DO;  Location: [facility] OR;  Service: Orthopedics;  Laterality: Left;    KNEE ARTHROSCOPY W/ PLICA EXCISION Left 03/02/2023    Procedure: EXCISION, PLICA, KNEE, ARTHROSCOPIC;  Surgeon: Ha Chawla DO;  Location: [facility] OR;  Service: Orthopedics;  Laterality: Left;    UPPER GASTROINTESTINAL ENDOSCOPY           Current Outpatient Medications:     aspirin (ECOTRIN) 81 MG EC tablet, Take 81 mg by mouth once daily.,  Disp: , Rfl:     blood-glucose meter kit, TID testing of blood sugar Type II DM with long term use of insulin, Disp: 1 each, Rfl: 0    cimetidine (TAGAMET) 400 MG tablet, Take 1 tablet by mouth nightly, Disp: 90 tablet, Rfl: 3    dapagliflozin (FARXIGA) 10 mg tablet, Take 1 tablet (10 mg total) by mouth once daily., Disp: 90 tablet, Rfl: 3    diclofenac sodium (VOLTAREN) 1 % Gel, Apply 2 g topically 4 (four) times daily., Disp: 100 g, Rfl: 1    escitalopram oxalate (LEXAPRO) 10 MG tablet, Take 20 mg by mouth once daily., Disp: , Rfl: 11    flash glucose sensor Kit, 2 each by Misc.(Non-Drug; Combo Route) route every 14 (fourteen) days., Disp: 2 kit, Rfl: 11    FREESTYLE JOHNATHON 3 SENSOR Cecy, SMARTSI Topical, Disp: , Rfl:     insulin aspart U-100 (NOVOLOG FLEXPEN U-100 INSULIN) 100 unit/mL (3 mL) InPn pen, INJECT 18 UNITS SUBCUTANEOUSLY THREE TIMES DAILY (Patient taking differently: Inject into the skin 3 (three) times daily 10 units in the am, 15 at lunch and supper), Disp: 15 mL, Rfl: 5    insulin glargine-yfgn 100 unit/mL (3 mL) InPn, INJECT 70 UNITS SUBCUTANEOUSLY IN THE EVENING, Disp: 15 mL, Rfl: 3    lisinopriL 10 MG tablet, Take 10 mg by mouth., Disp: , Rfl:     lisinopril-hydrochlorothiazide (PRINZIDE,ZESTORETIC) 10-12.5 mg per tablet, Take 1 tablet by mouth once daily., Disp: , Rfl:     meloxicam (MOBIC) 15 MG tablet, Take 1 tablet (15 mg total) by mouth once daily., Disp: 30 tablet, Rfl: 1    metFORMIN (GLUCOPHAGE) 1000 MG tablet, TAKE 1 TABLET BY MOUTH TWICE DAILY WITH MEALS, Disp: 180 tablet, Rfl: 2    metoprolol succinate (TOPROL-XL) 100 MG 24 hr tablet, Take 100 mg by mouth 2 (two) times daily., Disp: , Rfl:     omeprazole (PRILOSEC) 40 MG capsule, Take 1 capsule (40 mg total) by mouth Daily., Disp: 90 capsule, Rfl: 1    pravastatin (PRAVACHOL) 20 MG tablet, Take 20 mg by mouth once daily., Disp: , Rfl:     semaglutide (OZEMPIC) 2 mg/dose (8 mg/3 mL) PnIj, INJECT 2 MG SUBCUTANEOUSLY EVERY 7 DAYS, Disp:  3 mL, Rfl: 11    colestipoL (COLESTID) 1 gram Tab, TAKE ONE TABLET BY MOUTH ONCE DAILY (Patient not taking: Reported on 3/28/2024), Disp: 90 tablet, Rfl: 3    Review of patient's allergies indicates:  No Known Allergies    Family History   Problem Relation Name Age of Onset    COPD Mother      Diabetes Mother      Hypertension Mother      Colon cancer Mother      Dementia Mother      Heart disease Father      Hypertension Father      COPD Father      Colon cancer Maternal Uncle      Breast cancer Daughter      Ovarian cancer Neg Hx      Crohn's disease Neg Hx      Esophageal cancer Neg Hx      Liver cancer Neg Hx      Stomach cancer Neg Hx      Rectal cancer Neg Hx      Ulcerative colitis Neg Hx       Social History     Occupational History    Not on file   Tobacco Use    Smoking status: Never     Passive exposure: Past    Smokeless tobacco: Never   Substance and Sexual Activity    Alcohol use: Yes     Comment: rarely    Drug use: No    Sexual activity: Not on file

## 2024-06-27 ENCOUNTER — HOSPITAL ENCOUNTER (OUTPATIENT)
Dept: RADIOLOGY | Facility: HOSPITAL | Age: 59
Discharge: HOME | End: 2024-06-27
Attending: ORTHOPAEDIC SURGERY
Payer: COMMERCIAL

## 2024-06-27 ENCOUNTER — OFFICE VISIT (OUTPATIENT)
Dept: ORTHOPEDICS | Facility: CLINIC | Age: 59
End: 2024-06-27
Payer: COMMERCIAL

## 2024-06-27 VITALS
WEIGHT: 229.25 LBS | RESPIRATION RATE: 18 BRPM | BODY MASS INDEX: 39.14 KG/M2 | HEIGHT: 64 IN | OXYGEN SATURATION: 99 % | HEART RATE: 76 BPM

## 2024-06-27 DIAGNOSIS — Z98.890 S/P LEFT KNEE ARTHROSCOPY: ICD-10-CM

## 2024-06-27 DIAGNOSIS — M25.562 LEFT KNEE PAIN, UNSPECIFIED CHRONICITY: ICD-10-CM

## 2024-06-27 DIAGNOSIS — Z98.890 S/P LEFT KNEE ARTHROSCOPY: Primary | ICD-10-CM

## 2024-06-27 PROCEDURE — 1160F RVW MEDS BY RX/DR IN RCRD: CPT | Mod: S$GLB,,, | Performed by: ORTHOPAEDIC SURGERY

## 2024-06-27 PROCEDURE — 99999 PR PBB SHADOW E&M-EST. PATIENT-LVL V: CPT | Mod: PBBFAC,,, | Performed by: ORTHOPAEDIC SURGERY

## 2024-06-27 PROCEDURE — 4010F ACE/ARB THERAPY RXD/TAKEN: CPT | Mod: S$GLB,,, | Performed by: ORTHOPAEDIC SURGERY

## 2024-06-27 PROCEDURE — 99214 OFFICE O/P EST MOD 30 MIN: CPT | Mod: S$GLB,,, | Performed by: ORTHOPAEDIC SURGERY

## 2024-06-27 PROCEDURE — 1159F MED LIST DOCD IN RCRD: CPT | Mod: S$GLB,,, | Performed by: ORTHOPAEDIC SURGERY

## 2024-06-27 PROCEDURE — 3051F HG A1C>EQUAL 7.0%<8.0%: CPT | Mod: S$GLB,,, | Performed by: ORTHOPAEDIC SURGERY

## 2024-06-27 PROCEDURE — 73721 MRI JNT OF LWR EXTRE W/O DYE: CPT | Mod: TC,LT

## 2024-06-27 PROCEDURE — 3008F BODY MASS INDEX DOCD: CPT | Mod: S$GLB,,, | Performed by: ORTHOPAEDIC SURGERY

## 2024-06-27 RX ORDER — CELECOXIB 100 MG/1
100 CAPSULE ORAL 2 TIMES DAILY
Qty: 60 CAPSULE | Refills: 1 | Status: SHIPPED | OUTPATIENT
Start: 2024-06-27

## 2024-06-27 NOTE — PROGRESS NOTES
Subjective:      Patient ID: Zunilda Alvarez is a 59 y.o. female.    Chief Complaint: Pain of the Left Knee    HPI  Patient follow up on left knee.  Only got a week or so relief from her previous injection  ROS      Objective:    Ortho Exam     Constitutional:   Patient is alert  and oriented in no acute distress  HEENT:  normocephalic atraumatic; PERRL EOMI  Neck:  Supple without adenopathy  Cardiovascular:  Normal rate and rhythm  Pulmonary:  Normal respiratory effort normal chest wall expansion  Abdominal:  Nonprotuberant nondistended  Musculoskeletal:  Patient has a moderately antalgic gait diffuse medial joint line tenderness  Difficulty with full extension  Neurological:  No focal defect; cranial nerves 2-12 grossly intact  Psychiatric/behavioral:  Mood and behavior normal           My Radiographs Findings:    No new radiographs  Assessment:       Encounter Diagnosis   Name Primary?    S/P left knee arthroscopy Yes         Plan:       I have discussed medical condition treatment options with her at length.  I will give her a trial of Celebrex.  I will repeat an MRI in the event that she has an incompletely addressed meniscal tear re tear progression of arthritis or possibly some osteonecrosis developing.  I will see him back after her MRI sooner if any questions or problems.        Past Medical History:   Diagnosis Date    Adrenal nodule 09/30/2019    Arthritis     Chondromalacia, left knee 03/02/2023    Depression     Diabetes mellitus type 2, insulin dependent     Diarrhea 09/20/2019    Family history of colon cancer 08/07/2019    Fatty liver 10/13/2020    Gastroesophageal reflux disease 09/10/2019    High cholesterol     Hypertension     Liver cyst 11/04/2021    Palpitations 05/05/2023    Sleep apnea     Sprain of medial collateral ligament of left knee 12/13/2022    Tear of meniscus of left knee 01/2023     Past Surgical History:   Procedure Laterality Date    ARTHROSCOPIC CHONDROPLASTY OF KNEE JOINT  Left 12/14/2023    Procedure: ARTHROSCOPY, KNEE, WITH CHONDROPLASTY;  Surgeon: Ha Chawla DO;  Location: [facility] OR;  Service: Orthopedics;  Laterality: Left;    ARTHROSCOPY OF KNEE Left 03/02/2023    Procedure: ARTHROSCOPY, KNEE;  Surgeon: Ha Chawla DO;  Location: [facility] OR;  Service: Orthopedics;  Laterality: Left;    ARTHROSCOPY OF KNEE Left 12/14/2023    Procedure: ARTHROSCOPY, KNEE;  Surgeon: Ha Chawla DO;  Location: [facility] OR;  Service: Orthopedics;  Laterality: Left;    ARTHROSCOPY OF KNEE WITH ARTHROPLASTY OF PATELLOFEMORAL JOINT Left 03/02/2023    Procedure: ARTHROSCOPY, KNEE, WITH PATELLOFEMORAL;  Surgeon: Ha Chawla DO;  Location: [facility] OR;  Service: Orthopedics;  Laterality: Left;  Chondroplasty    COLONOSCOPY N/A 09/10/2019    Procedure: COLONOSCOPY;  Surgeon: Carter Rice MD;  Location: [facility];  Service: Endoscopy;  Laterality: N/A;    ESOPHAGOGASTRODUODENOSCOPY N/A 09/10/2019    Procedure: EGD (ESOPHAGOGASTRODUODENOSCOPY);  Surgeon: Carter Rice MD;  Location: [facility];  Service: Endoscopy;  Laterality: N/A;    HERNIA REPAIR      HYSTERECTOMY  1998    KNEE ARTHROSCOPY W/ MENISCECTOMY Left 03/02/2023    Procedure: ARTHROSCOPY, KNEE, WITH MENISCECTOMY;  Surgeon: Ha Chawla DO;  Location: [facility] OR;  Service: Orthopedics;  Laterality: Left;  PARTIAL    KNEE ARTHROSCOPY W/ MENISCECTOMY Left 12/14/2023    Procedure: ARTHROSCOPY, KNEE, WITH MENISCECTOMY;  Surgeon: Ha Chawla DO;  Location: [facility] OR;  Service: Orthopedics;  Laterality: Left;    KNEE ARTHROSCOPY W/ PLICA EXCISION Left 03/02/2023    Procedure: EXCISION, PLICA, KNEE, ARTHROSCOPIC;  Surgeon: Ha Chawla DO;  Location: [facility] OR;  Service: Orthopedics;  Laterality: Left;    UPPER GASTROINTESTINAL ENDOSCOPY           Current Outpatient Medications:     aspirin (ECOTRIN) 81 MG EC tablet, Take 81 mg by mouth once daily., Disp: , Rfl:     cimetidine (TAGAMET) 400 MG tablet, Take 1 tablet by mouth  nightly, Disp: 90 tablet, Rfl: 3    dapagliflozin (FARXIGA) 10 mg tablet, Take 1 tablet (10 mg total) by mouth once daily., Disp: 90 tablet, Rfl: 3    escitalopram oxalate (LEXAPRO) 10 MG tablet, Take 20 mg by mouth once daily., Disp: , Rfl: 11    flash glucose sensor Kit, 2 each by Misc.(Non-Drug; Combo Route) route every 14 (fourteen) days., Disp: 2 kit, Rfl: 11    FREESTYLE JOHNATHON 3 SENSOR Cecy, SMARTSI Topical, Disp: , Rfl:     insulin aspart U-100 (NOVOLOG FLEXPEN U-100 INSULIN) 100 unit/mL (3 mL) InPn pen, INJECT 18 UNITS SUBCUTANEOUSLY THREE TIMES DAILY (Patient taking differently: Inject into the skin 3 (three) times daily 10 units in the am, 15 at lunch and supper), Disp: 15 mL, Rfl: 5    insulin glargine-yfgn 100 unit/mL (3 mL) InPn, INJECT 70 UNITS SUBCUTANEOUSLY IN THE EVENING, Disp: 15 mL, Rfl: 3    lisinopriL 10 MG tablet, Take 10 mg by mouth., Disp: , Rfl:     metFORMIN (GLUCOPHAGE) 1000 MG tablet, TAKE 1 TABLET BY MOUTH TWICE DAILY WITH MEALS, Disp: 180 tablet, Rfl: 2    metoprolol succinate (TOPROL-XL) 100 MG 24 hr tablet, Take 100 mg by mouth 2 (two) times daily., Disp: , Rfl:     pravastatin (PRAVACHOL) 20 MG tablet, Take 20 mg by mouth once daily., Disp: , Rfl:     semaglutide (OZEMPIC) 2 mg/dose (8 mg/3 mL) PnIj, INJECT 2 MG SUBCUTANEOUSLY EVERY 7 DAYS, Disp: 3 mL, Rfl: 11    blood-glucose meter kit, TID testing of blood sugar Type II DM with long term use of insulin, Disp: 1 each, Rfl: 0    colestipoL (COLESTID) 1 gram Tab, TAKE ONE TABLET BY MOUTH ONCE DAILY, Disp: 90 tablet, Rfl: 3    diclofenac sodium (VOLTAREN) 1 % Gel, Apply 2 g topically 4 (four) times daily., Disp: 100 g, Rfl: 1    lisinopril-hydrochlorothiazide (PRINZIDE,ZESTORETIC) 10-12.5 mg per tablet, Take 1 tablet by mouth once daily., Disp: , Rfl:     meloxicam (MOBIC) 15 MG tablet, Take 1 tablet (15 mg total) by mouth once daily., Disp: 30 tablet, Rfl: 1    omeprazole (PRILOSEC) 40 MG capsule, Take 1 capsule (40 mg total) by  mouth Daily., Disp: 90 capsule, Rfl: 1    Review of patient's allergies indicates:  No Known Allergies    Family History   Problem Relation Name Age of Onset    COPD Mother      Diabetes Mother      Hypertension Mother      Colon cancer Mother      Dementia Mother      Heart disease Father      Hypertension Father      COPD Father      Colon cancer Maternal Uncle      Breast cancer Daughter      Ovarian cancer Neg Hx      Crohn's disease Neg Hx      Esophageal cancer Neg Hx      Liver cancer Neg Hx      Stomach cancer Neg Hx      Rectal cancer Neg Hx      Ulcerative colitis Neg Hx       Social History     Occupational History    Not on file   Tobacco Use    Smoking status: Never     Passive exposure: Past    Smokeless tobacco: Never   Substance and Sexual Activity    Alcohol use: Yes     Comment: rarely    Drug use: No    Sexual activity: Not on file

## 2024-07-08 ENCOUNTER — OFFICE VISIT (OUTPATIENT)
Dept: ORTHOPEDICS | Facility: CLINIC | Age: 59
End: 2024-07-08
Payer: COMMERCIAL

## 2024-07-08 VITALS
BODY MASS INDEX: 39.27 KG/M2 | OXYGEN SATURATION: 97 % | WEIGHT: 230 LBS | HEART RATE: 74 BPM | HEIGHT: 64 IN | RESPIRATION RATE: 19 BRPM

## 2024-07-08 DIAGNOSIS — Z98.890 S/P LEFT KNEE ARTHROSCOPY: Primary | ICD-10-CM

## 2024-07-08 PROCEDURE — 1159F MED LIST DOCD IN RCRD: CPT | Mod: S$GLB,,, | Performed by: ORTHOPAEDIC SURGERY

## 2024-07-08 PROCEDURE — 3051F HG A1C>EQUAL 7.0%<8.0%: CPT | Mod: S$GLB,,, | Performed by: ORTHOPAEDIC SURGERY

## 2024-07-08 PROCEDURE — 4010F ACE/ARB THERAPY RXD/TAKEN: CPT | Mod: S$GLB,,, | Performed by: ORTHOPAEDIC SURGERY

## 2024-07-08 PROCEDURE — 99999 PR PBB SHADOW E&M-EST. PATIENT-LVL IV: CPT | Mod: PBBFAC,,, | Performed by: ORTHOPAEDIC SURGERY

## 2024-07-08 PROCEDURE — 99214 OFFICE O/P EST MOD 30 MIN: CPT | Mod: S$GLB,,, | Performed by: ORTHOPAEDIC SURGERY

## 2024-07-08 PROCEDURE — 3008F BODY MASS INDEX DOCD: CPT | Mod: S$GLB,,, | Performed by: ORTHOPAEDIC SURGERY

## 2024-07-08 PROCEDURE — 1160F RVW MEDS BY RX/DR IN RCRD: CPT | Mod: S$GLB,,, | Performed by: ORTHOPAEDIC SURGERY

## 2024-07-08 NOTE — PROGRESS NOTES
Subjective:      Patient ID: Zunilda Alvarez is a 59 y.o. female.    Chief Complaint: Results and Pain of the Left Knee (Review MRI results)    HPI  Follow up on her left knee.  She has been quite active over the holiday weekend complaining of some general soreness.  She feels like the Celebrex is helping.  MRI was accomplished.  ROS      Objective:    Ortho Exam     Constitutional:   Patient is alert  and oriented in no acute distress  HEENT:  normocephalic atraumatic; PERRL EOMI  Neck:  Supple without adenopathy  Cardiovascular:  Normal rate and rhythm  Pulmonary:  Normal respiratory effort normal chest wall expansion  Abdominal:  Nonprotuberant nondistended  Musculoskeletal:  Patient has a slightly antalgic gait  Good range of motion of the knee no increased warmth or erythema  No significant effusion some patellofemoral crepitus and diffuse joint line tenderness  Neurological:  No focal defect; cranial nerves 2-12 grossly intact  Psychiatric/behavioral:  Mood and behavior normal      MRI Knee Without Contrast Left  Narrative: EXAMINATION:  MRI KNEE WITHOUT CONTRAST LEFT    CLINICAL HISTORY:  Knee trauma, internal derangement suspected, xray done;Other specified postprocedural states    TECHNIQUE:  Multiplanar, multisequence images were performed about the left knee.  No contrast was administered.    COMPARISON:  MRI left knee of November 20, 2023    FINDINGS:  An acute fracture of the femur, tibia, fibula or patella is not seen.  There is thinning and irregularity of the posterior articular cartilage of the patella with subchondral bone edema consistent with chondromalacia patella.  There is thinning of the cartilage of the femur most prominent laterally.  Subchondral bone resorption or edema is not seen.  A trace joint effusion is noted.  Spur formation is noted at the superior pole of the patella.    The patellotibial ligament is intact.  The medial and collateral ligaments are intact with normal MR  signal.  The anterior and posterior cruciate ligaments are intact with normal MR signal.    A tear of the medial meniscus is not seen on today's study.  There is mild increased signal intensity from the posterior horn of the medial meniscus and the anterior horn of the lateral meniscus  consistent with intrasubstance degeneration.  No free fragments are noted in the joint.  Impression: Chondromalacia patella.  Osteoarthritis with spur formation of the patella and loss of the articular cartilage March prominent in the lateral compartment.  Intrasubstance degeneration of the anterior horn of the lateral malleolus and posterior horn of the medial meniscus without a tear identified today    Electronically signed by: Moreno Boss MD  Date:    06/27/2024  Time:    15:12       My Radiographs Findings:    I have personally reviewed radiographs and concur with above findings    Assessment:       No diagnosis found.      Plan:       I have discussed medical condition and treatment options with her at at length after reviewing her MRI findings with her.  She has content with the ongoing conservative treatment.  If symptoms become a bit unmanageable with what we can offer then I would suggest referral to 1 of the adult reconstructive specialist to see if they think partial or patellofemoral arthroplasty maybe of benefit to her.        Past Medical History:   Diagnosis Date    Adrenal nodule 09/30/2019    Arthritis     Chondromalacia, left knee 03/02/2023    Depression     Diabetes mellitus type 2, insulin dependent     Diarrhea 09/20/2019    Family history of colon cancer 08/07/2019    Fatty liver 10/13/2020    Gastroesophageal reflux disease 09/10/2019    High cholesterol     Hypertension     Liver cyst 11/04/2021    Palpitations 05/05/2023    Sleep apnea     Sprain of medial collateral ligament of left knee 12/13/2022    Tear of meniscus of left knee 01/2023     Past Surgical History:   Procedure Laterality Date     ARTHROSCOPIC CHONDROPLASTY OF KNEE JOINT Left 12/14/2023    Procedure: ARTHROSCOPY, KNEE, WITH CHONDROPLASTY;  Surgeon: Ha Chawla DO;  Location: [facility] OR;  Service: Orthopedics;  Laterality: Left;    ARTHROSCOPY OF KNEE Left 03/02/2023    Procedure: ARTHROSCOPY, KNEE;  Surgeon: Ha Chawla DO;  Location: [facility] OR;  Service: Orthopedics;  Laterality: Left;    ARTHROSCOPY OF KNEE Left 12/14/2023    Procedure: ARTHROSCOPY, KNEE;  Surgeon: Ha Chawla DO;  Location: [facility] OR;  Service: Orthopedics;  Laterality: Left;    ARTHROSCOPY OF KNEE WITH ARTHROPLASTY OF PATELLOFEMORAL JOINT Left 03/02/2023    Procedure: ARTHROSCOPY, KNEE, WITH PATELLOFEMORAL;  Surgeon: Ha Chawla DO;  Location: [facility] OR;  Service: Orthopedics;  Laterality: Left;  Chondroplasty    COLONOSCOPY N/A 09/10/2019    Procedure: COLONOSCOPY;  Surgeon: Carter Rice MD;  Location: [facility];  Service: Endoscopy;  Laterality: N/A;    ESOPHAGOGASTRODUODENOSCOPY N/A 09/10/2019    Procedure: EGD (ESOPHAGOGASTRODUODENOSCOPY);  Surgeon: Carter Rice MD;  Location: [facility];  Service: Endoscopy;  Laterality: N/A;    HERNIA REPAIR      HYSTERECTOMY  1998    KNEE ARTHROSCOPY W/ MENISCECTOMY Left 03/02/2023    Procedure: ARTHROSCOPY, KNEE, WITH MENISCECTOMY;  Surgeon: Ha Chawla DO;  Location: [facility] OR;  Service: Orthopedics;  Laterality: Left;  PARTIAL    KNEE ARTHROSCOPY W/ MENISCECTOMY Left 12/14/2023    Procedure: ARTHROSCOPY, KNEE, WITH MENISCECTOMY;  Surgeon: Ha Chawla DO;  Location: [facility] OR;  Service: Orthopedics;  Laterality: Left;    KNEE ARTHROSCOPY W/ PLICA EXCISION Left 03/02/2023    Procedure: EXCISION, PLICA, KNEE, ARTHROSCOPIC;  Surgeon: Ha Chawla DO;  Location: [facility] OR;  Service: Orthopedics;  Laterality: Left;    UPPER GASTROINTESTINAL ENDOSCOPY           Current Outpatient Medications:     aspirin (ECOTRIN) 81 MG EC tablet, Take 81 mg by mouth once daily., Disp: , Rfl:     blood-glucose meter kit,  TID testing of blood sugar Type II DM with long term use of insulin, Disp: 1 each, Rfl: 0    celecoxib (CELEBREX) 100 MG capsule, Take 1 capsule (100 mg total) by mouth 2 (two) times daily., Disp: 60 capsule, Rfl: 1    cimetidine (TAGAMET) 400 MG tablet, Take 1 tablet by mouth nightly, Disp: 90 tablet, Rfl: 3    colestipoL (COLESTID) 1 gram Tab, TAKE ONE TABLET BY MOUTH ONCE DAILY, Disp: 90 tablet, Rfl: 3    dapagliflozin (FARXIGA) 10 mg tablet, Take 1 tablet (10 mg total) by mouth once daily., Disp: 90 tablet, Rfl: 3    diclofenac sodium (VOLTAREN) 1 % Gel, Apply 2 g topically 4 (four) times daily., Disp: 100 g, Rfl: 1    escitalopram oxalate (LEXAPRO) 10 MG tablet, Take 20 mg by mouth once daily., Disp: , Rfl: 11    flash glucose sensor Kit, 2 each by Misc.(Non-Drug; Combo Route) route every 14 (fourteen) days., Disp: 2 kit, Rfl: 11    FREESTYLE JOHNATHON 3 SENSOR Cecy, SMARTSI Topical, Disp: , Rfl:     insulin aspart U-100 (NOVOLOG FLEXPEN U-100 INSULIN) 100 unit/mL (3 mL) InPn pen, INJECT 18 UNITS SUBCUTANEOUSLY THREE TIMES DAILY (Patient taking differently: Inject into the skin 3 (three) times daily 10 units in the am, 15 at lunch and supper), Disp: 15 mL, Rfl: 5    insulin glargine-yfgn 100 unit/mL (3 mL) InPn, INJECT 70 UNITS SUBCUTANEOUSLY IN THE EVENING, Disp: 15 mL, Rfl: 3    lisinopriL 10 MG tablet, Take 10 mg by mouth., Disp: , Rfl:     lisinopril-hydrochlorothiazide (PRINZIDE,ZESTORETIC) 10-12.5 mg per tablet, Take 1 tablet by mouth once daily., Disp: , Rfl:     meloxicam (MOBIC) 15 MG tablet, Take 1 tablet (15 mg total) by mouth once daily., Disp: 30 tablet, Rfl: 1    metFORMIN (GLUCOPHAGE) 1000 MG tablet, TAKE 1 TABLET BY MOUTH TWICE DAILY WITH MEALS, Disp: 180 tablet, Rfl: 2    metoprolol succinate (TOPROL-XL) 100 MG 24 hr tablet, Take 100 mg by mouth 2 (two) times daily., Disp: , Rfl:     pravastatin (PRAVACHOL) 20 MG tablet, Take 20 mg by mouth once daily., Disp: , Rfl:     semaglutide (OZEMPIC) 2  mg/dose (8 mg/3 mL) PnIj, INJECT 2 MG SUBCUTANEOUSLY EVERY 7 DAYS, Disp: 3 mL, Rfl: 11    omeprazole (PRILOSEC) 40 MG capsule, Take 1 capsule (40 mg total) by mouth Daily., Disp: 90 capsule, Rfl: 1    Review of patient's allergies indicates:  No Known Allergies    Family History   Problem Relation Name Age of Onset    COPD Mother      Diabetes Mother      Hypertension Mother      Colon cancer Mother      Dementia Mother      Heart disease Father      Hypertension Father      COPD Father      Colon cancer Maternal Uncle      Breast cancer Daughter      Ovarian cancer Neg Hx      Crohn's disease Neg Hx      Esophageal cancer Neg Hx      Liver cancer Neg Hx      Stomach cancer Neg Hx      Rectal cancer Neg Hx      Ulcerative colitis Neg Hx       Social History     Occupational History    Not on file   Tobacco Use    Smoking status: Never     Passive exposure: Never    Smokeless tobacco: Never   Substance and Sexual Activity    Alcohol use: Yes     Comment: rarely    Drug use: No    Sexual activity: Not on file

## 2024-07-21 DIAGNOSIS — K21.9 GASTROESOPHAGEAL REFLUX DISEASE, UNSPECIFIED WHETHER ESOPHAGITIS PRESENT: ICD-10-CM

## 2024-07-22 RX ORDER — OMEPRAZOLE 40 MG/1
40 CAPSULE, DELAYED RELEASE ORAL
Qty: 90 CAPSULE | Refills: 0 | Status: SHIPPED | OUTPATIENT
Start: 2024-07-22

## 2024-07-23 ENCOUNTER — TELEPHONE (OUTPATIENT)
Dept: GASTROENTEROLOGY | Facility: CLINIC | Age: 59
End: 2024-07-23
Payer: COMMERCIAL

## 2024-07-23 NOTE — TELEPHONE ENCOUNTER
Received refill auth request via fax from [facility] pharmacy in [place], [place] for Omeprazole 40 mg. Tried to call pharmacy, no answer from staff.

## 2024-07-24 ENCOUNTER — TELEPHONE (OUTPATIENT)
Dept: GASTROENTEROLOGY | Facility: CLINIC | Age: 59
End: 2024-07-24
Payer: COMMERCIAL

## 2024-07-24 NOTE — TELEPHONE ENCOUNTER
Tried calling [facility] in [place], [place] again in regards to refill at request for omeprazole 40 mg cap; however, line rung and rung with no one in pharmacy answering call. Called patient to determine if she had issues picking up med; however, she states she is to  med on Friday so will let me know if she has any issues at that point.

## 2024-09-04 DIAGNOSIS — M25.562 LEFT KNEE PAIN, UNSPECIFIED CHRONICITY: ICD-10-CM

## 2024-09-04 DIAGNOSIS — Z98.890 S/P LEFT KNEE ARTHROSCOPY: ICD-10-CM

## 2024-09-04 NOTE — TELEPHONE ENCOUNTER
30 supply sent to Dr. Pizarro. MyChart message sent to pt advising future refills should be requested by PCP.

## 2024-09-05 RX ORDER — CELECOXIB 100 MG/1
100 CAPSULE ORAL 2 TIMES DAILY
Qty: 60 CAPSULE | Refills: 0 | Status: SHIPPED | OUTPATIENT
Start: 2024-09-05

## 2024-10-23 DIAGNOSIS — K21.9 GASTROESOPHAGEAL REFLUX DISEASE, UNSPECIFIED WHETHER ESOPHAGITIS PRESENT: ICD-10-CM

## 2024-10-23 RX ORDER — OMEPRAZOLE 40 MG/1
40 CAPSULE, DELAYED RELEASE ORAL
Qty: 90 CAPSULE | Refills: 0 | Status: SHIPPED | OUTPATIENT
Start: 2024-10-23

## 2025-01-26 DIAGNOSIS — K21.9 GASTROESOPHAGEAL REFLUX DISEASE, UNSPECIFIED WHETHER ESOPHAGITIS PRESENT: ICD-10-CM

## 2025-01-26 RX ORDER — OMEPRAZOLE 40 MG/1
40 CAPSULE, DELAYED RELEASE ORAL
Qty: 90 CAPSULE | Refills: 0 | Status: SHIPPED | OUTPATIENT
Start: 2025-01-26

## 2025-02-05 ENCOUNTER — HOSPITAL ENCOUNTER (OUTPATIENT)
Dept: RADIOLOGY | Facility: HOSPITAL | Age: 60
Discharge: HOME | End: 2025-02-05
Attending: NURSE PRACTITIONER
Payer: COMMERCIAL

## 2025-02-05 DIAGNOSIS — Z12.31 ENCOUNTER FOR SCREENING MAMMOGRAM FOR BREAST CANCER: ICD-10-CM

## 2025-02-05 PROCEDURE — 77063 BREAST TOMOSYNTHESIS BI: CPT | Mod: TC

## 2025-02-05 PROCEDURE — 77067 SCR MAMMO BI INCL CAD: CPT | Mod: 26,,, | Performed by: RADIOLOGY

## 2025-02-05 PROCEDURE — 77063 BREAST TOMOSYNTHESIS BI: CPT | Mod: 26,,, | Performed by: RADIOLOGY

## 2025-04-22 DIAGNOSIS — K21.9 GASTROESOPHAGEAL REFLUX DISEASE, UNSPECIFIED WHETHER ESOPHAGITIS PRESENT: ICD-10-CM

## 2025-04-22 RX ORDER — OMEPRAZOLE 40 MG/1
40 CAPSULE, DELAYED RELEASE ORAL
Qty: 30 CAPSULE | Refills: 0 | Status: SHIPPED | OUTPATIENT
Start: 2025-04-22

## 2025-05-06 DIAGNOSIS — Z96.652 HISTORY OF TOTAL KNEE ARTHROPLASTY, LEFT: Primary | ICD-10-CM

## 2025-05-22 ENCOUNTER — CLINICAL SUPPORT (OUTPATIENT)
Dept: REHABILITATION | Facility: HOSPITAL | Age: 60
End: 2025-05-22
Attending: ORTHOPAEDIC SURGERY
Payer: COMMERCIAL

## 2025-05-22 DIAGNOSIS — Z96.652 HISTORY OF TOTAL KNEE ARTHROPLASTY, LEFT: Primary | ICD-10-CM

## 2025-05-22 PROCEDURE — 97161 PT EVAL LOW COMPLEX 20 MIN: CPT

## 2025-05-22 PROCEDURE — 97530 THERAPEUTIC ACTIVITIES: CPT

## 2025-05-22 PROCEDURE — 97110 THERAPEUTIC EXERCISES: CPT

## 2025-05-22 NOTE — PROGRESS NOTES
Outpatient Rehab    Physical Therapy Visit    Patient Name: Zunilda Alvarez  MRN: [id number]  YOB: 1965  Encounter Date: 5/22/2025    Therapy Diagnosis: No diagnosis found.  Physician: Rashaad Olivarez*    Physician Orders: Eval and Treat  Medical Diagnosis: History of total knee arthroplasty, left    Visit # / Visits Authorized:  1 / 1  Insurance Authorization Period: 5/6/2025 to 12/31/2025  Date of Evaluation: 5/22/2025  Plan of Care Certification:       PT/PTA:     Number of PTA visits since last PT visit:   Time In:     Time Out:    Total Time (in minutes):     Total Billable Time (in minutes):      FOTO:  Intake Score:  %  Survey Score 2:  %  Survey Score 3:  %    Precautions:       Subjective             Objective            Treatment:       Time Entry(in minutes):       Assessment & Plan   Assessment:         The patient will continue to benefit from skilled outpatient physical therapy in order to address the deficits listed in the problem list on the initial evaluation, provide patient and family education, and maximize the patients level of independence in the home and community environments.     The patient's spiritual, cultural, and educational needs were considered, and the patient is agreeable to the plan of care and goals.           Plan:      Goals:     Vasquez Sanders, PT

## 2025-05-22 NOTE — PROGRESS NOTES
Outpatient Rehab    Physical Therapy Evaluation    Patient Name: Zunilda Alvarez  MRN: [id number]  YOB: 1965  Encounter Date: 5/22/2025    Therapy Diagnosis:   Encounter Diagnosis   Name Primary?    History of total knee arthroplasty, left Yes     Physician: Rashaad Olivarez*    Physician Orders: Eval and Treat  Medical Diagnosis: History of total knee arthroplasty, left    Visit # / Visits Authorized:  1 / 1  Insurance Authorization Period: 5/6/2025 to 12/31/2025  Date of Evaluation: 5/22/2025  Plan of Care Certification: 5/22/2025 to 7/17/2025     Time In: 0900   Time Out: 1000  Total Time (in minutes): 60   Total Billable Time (in minutes):      Intake Outcome Measure for FOTO Survey    Therapist reviewed FOTO scores for Zunilda Alvarez on 5/22/2025.   FOTO report - see Media section or FOTO account episode details.     Intake Score:  %    Precautions:       Subjective   History of Present Illness  Zunilda is a 60 y.o. female who reports to physical therapy with a chief concern of The patient is a 60 year old female presenting s/p L TKA performed 4/21/2025. She reports undergoing home health PT for 3 weeks following surgical intervention. Denies adverse response with acute recovery; compliant with home health HEP interventions. Reports difficulty negotiating stairs, walking on uneven surfaces, sleeping and performing ADLs..       Patient reports a surgery of L TKA.          Dominant Hand: Left    Pain     Patient reports a current pain level of 3/10. Pain at best is reported as 3/10. Pain at worst is reported as 5/10.   Location: L knee  Clinical Progression (since onset): Stable  Pain Qualities: Aching, Tightness, Discomfort  Pain-Relieving Factors: Activity modification, Medications - over-the-counter, Medications - prescription  Pain-Aggravating Factors: Squatting, Bending, Walking, Transfers, Stair climbing, Lifting, Kneeling         Employment  Employment Status: Employed  full-time          Past Medical History/Physical Systems Review:   Zunilda Alvarez  has a past medical history of Adrenal nodule, Arthritis, Chondromalacia, left knee, Depression, Diabetes mellitus type 2, insulin dependent, Diarrhea, Family history of colon cancer, Fatty liver, Gastroesophageal reflux disease, High cholesterol, Hypertension, Liver cyst, Palpitations, Sleep apnea, Sprain of medial collateral ligament of left knee, and Tear of meniscus of left knee.    Zunilda Alvarez  has a past surgical history that includes Hysterectomy (1998); Hernia repair; Esophagogastroduodenoscopy (N/A, 09/10/2019); Colonoscopy (N/A, 09/10/2019); Arthroscopy of knee (Left, 03/02/2023); Knee arthroscopy w/ meniscectomy (Left, 03/02/2023); Arthroscopy of knee with arthroplasty of patellofemoral joint (Left, 03/02/2023); Knee arthroscopy w/ plica excision (Left, 03/02/2023); Upper gastrointestinal endoscopy; Arthroscopy of knee (Left, 12/14/2023); Knee arthroscopy w/ meniscectomy (Left, 12/14/2023); and Arthroscopic chondroplasty of knee joint (Left, 12/14/2023).    Zunilda has a current medication list which includes the following prescription(s): aspirin, blood-glucose meter, celecoxib, cimetidine, colestipol, dapagliflozin propanediol, diclofenac sodium, escitalopram oxalate, flash glucose sensor, freestyle mainor 3 sensor, novolog flexpen u-100 insulin, insulin glargine-yfgn, lisinopril, lisinopril-hydrochlorothiazide, meloxicam, metformin, metoprolol succinate, omeprazole, pravastatin, and ozempic.    Review of patient's allergies indicates:  No Known Allergies     Objective       Knee Range of Motion   Right Knee   Active (deg) Passive (deg) Pain   Flexion 125       Extension 0           Left Knee   Active (deg) Passive (deg) Pain   Flexion 116       Extension -6           Pain at end range L flexion and extension              Hip Strength - Planes of Motion   Right Strength Right Pain Left Strength Left  Pain    Flexion (L2) 4+   3+     Extension 4+   3+     ABduction 4+   3+     ADduction 4+   3+     Internal Rotation 4+   3+     External Rotation 4+   3+         Knee Strength   Right Strength Right Pain Left Strength Left  Pain   Flexion (S2) 4+   3     Prone Flexion 4+   3     Extension (L3) 4+   3                   Fall Risk  Functional mobility test results suggest the patient is: At Risk for Falls  Four Stage Balance Test  Narrow Base of Support: 10 sec sec  Tandem Stand - Right Foot in Front: 4 sec  Tandem Stand - Left Foot in Front: 4 sec  Semi-Tandem Stand - Right Foot in Front: 5 sec  Semi-Tandem Stand - Left Foot in Front: 5 sec  Single Leg Stand - Right Foot: 4 sec  Single Leg Stand - Left Foot: 0 sec            Gait Analysis  Gait Pattern: Antalgic  Walking Speed: Decreased    Right Side Walking Observations  Decreased: Arm Swing       Left Side Walking Observations  Decreased: Arm Swing            Treatment:  Therapeutic Exercise  TE 1: Knee extension stretch 3 x 90 seconds  TE 2: Heel slides x 15 reps  TE 3: SAQ x15 5 second hold  TE 4: LAQ x 15  TE 5: HS peanut squeeze 3 seconds x 15  TE 6: Heel raises x 15  TE 7: Gastroc stretch at wedge 3 x 30 seconds  TE 8: Bike 3 min level 1  Therapeutic Activity  TA 1: Patient education regarding HEP and activity modification.  TA 2: Patient education regarding work place accomodations.    Time Entry(in minutes):  PT Evaluation (Low) Time Entry: 15  Therapeutic Activity Time Entry: 15  Therapeutic Exercise Time Entry: 30    Assessment & Plan   Assessment  Zunilda presents with a condition of Low complexity.   Presentation of Symptoms: Stable  Will Comorbidities Impact Care: No       Functional Limitations: Activity tolerance, Completing self-care activities, Proprioception, Range of motion, Participating in leisure activities, Pain with ADLs/IADLs, Gross motor coordination  Impairments: Pain with functional activity, Impaired physical strength  Personal Factors  Affecting Prognosis: Pain    Patient Goal for Therapy (PT): Walk dog without limitations and dance on a cruise in 6 months  Prognosis: Excellent  Assessment Details: Patient demonstrates deficits with range of motion, strength, and function that limit ability to participate in school, work, and recreational activities. They would benefit from skilled PT services to normalize kinetic chain mobility, strength, and function to safely return to their prior level of activity.    Plan  From a physical therapy perspective, the patient would benefit from: Skilled Rehab Services    Planned therapy interventions include: Therapeutic exercise, Therapeutic activities, Neuromuscular re-education, Manual therapy, ADLs/IADLs, Other (Comment), and Gait training.    Planned modalities to include: Biofeedback, Electrical stimulation - attended, Electrical stimulation - passive/unattended, Thermotherapy (hot pack), and Cryotherapy (cold pack).        Visit Frequency: 2 times Per Week for 8 Weeks.       This plan was discussed with Patient.   Discussion participants: Agreed Upon Plan of Care  Plan details: Frequency and duration of treatment to be adjusted as needed          The patient's spiritual, cultural, and educational needs were considered, and the patient is agreeable to the plan of care and goals.           Goals:   Active       LTGs 8 weeks       Patient to improve 30 second sit to stand to 12 reps indicating improved LE strength.        Start:  05/22/25    Expected End:  07/17/25            Patient to improve LE strength to 4+/5 B to improve gait stability.        Start:  05/22/25    Expected End:  07/17/25            Patient to improve L SLS to 10 seconds without increased pain to improve gait stability.        Start:  05/22/25    Expected End:  07/17/25            Patient to walk .25 miles without pain to improve community ambulation.        Start:  05/22/25    Expected End:  07/17/25               STGs 3 weeks        Patient to demonstrate knowledge and understanding of HEP.        Start:  05/22/25    Expected End:  07/17/25            Patient to improve L knee ROM by 10 degrees of flexion.        Start:  05/22/25    Expected End:  07/17/25            Patient to improve L Knee extension to 0 degrees.        Start:  05/22/25    Expected End:  07/17/25            Patient to perform L SLS of 5 seconds without increased pain.        Start:  05/22/25    Expected End:  07/17/25                Vasquez Sanders, PT

## 2025-05-22 NOTE — LETTER
May 22, 2025  Rashaad Olivarez MD  [id number] [address] [address]    To whom it may concern,     The attached plan of care is being sent to you for review and reference.    You may indicate your approval by signing the document electronically, or by faxing/mailing a signed copy of the final page of this document back to the attention of Vasquez Sanders, PT:         Plan of Care 5/22/25   Effective from: 5/22/2025  Effective to: 7/17/2025    Active  Plan ID: [id number]           Participants as of 5/22/2025    Name Type Comments Contact Info    Rashaad Olivarez MD Referring Provider  [phone]    Vasquez Sanders PT Physical Therapist        Last Plan Note     No notes of the expected types and statuses found.            Sincerely,      Vasquez Sanders PT  [facility]                                                            Dear Vasquez Sanders PT,    RE: Ms. Zunilda Alvarez, MRN: [id number]    I certify that I have reviewed the attached plan of care and agree to the details within.        ___________________________  ___________________________  Provider Printed Name   Provider Signed Name      ___________________________  Date and Time

## 2025-05-23 DIAGNOSIS — K21.9 GASTROESOPHAGEAL REFLUX DISEASE, UNSPECIFIED WHETHER ESOPHAGITIS PRESENT: ICD-10-CM

## 2025-05-23 RX ORDER — OMEPRAZOLE 40 MG/1
40 CAPSULE, DELAYED RELEASE ORAL
Qty: 30 CAPSULE | Refills: 0 | OUTPATIENT
Start: 2025-05-23

## 2025-05-23 NOTE — TELEPHONE ENCOUNTER
I have refilled medication, please have patient come in for yearly visit.    Thank you,  BURKE Baires

## 2025-05-28 ENCOUNTER — CLINICAL SUPPORT (OUTPATIENT)
Dept: REHABILITATION | Facility: HOSPITAL | Age: 60
End: 2025-05-28
Payer: COMMERCIAL

## 2025-05-28 DIAGNOSIS — Z74.09 IMPAIRED FUNCTIONAL MOBILITY, BALANCE, GAIT, AND ENDURANCE: Primary | ICD-10-CM

## 2025-05-28 PROCEDURE — 97110 THERAPEUTIC EXERCISES: CPT

## 2025-05-28 NOTE — PROGRESS NOTES
Outpatient Rehab    Physical Therapy Visit    Patient Name: Zunilda Alvarez  MRN: [id number]  YOB: 1965  Encounter Date: 5/28/2025    Therapy Diagnosis:   Encounter Diagnosis   Name Primary?    Impaired functional mobility, balance, gait, and endurance Yes     Physician: Rashaad Olivarez*    Physician Orders: Eval and Treat  Medical Diagnosis: History of total knee arthroplasty, left    Visit # / Visits Authorized:  1 / 12  Insurance Authorization Period: 5/22/2025 to 12/31/2025  Date of Evaluation: 5/22/2025  Plan of Care Certification: 5/22/2025 to 7/17/2025      PT/PTA:     Number of PTA visits since last PT visit:   Time In:     Time Out:    Total Time (in minutes):     Total Billable Time (in minutes):      FOTO:  Intake Score:  %  Survey Score 2:  %  Survey Score 3:  %    Precautions:       Subjective   Reports stepping onto the back of a golf cart and straining muscle in her L leg..         Objective            Treatment:  Therapeutic Exercise  TE 1: Knee extension stretch 3 x 90 seconds  TE 2: Heel slides x 20 reps; active and passive  TE 3: SAQ x20 1# 5 second hold  TE 4: LAQ x 20 1#  TE 5: HS peanut squeeze 3 seconds x 20  TE 6: Heel raises x 20  TE 7: Gastroc stretch at wedge 3 x 30 seconds  TE 8: Bike 5 min level 2  TE 9: SL hip ABD 1# x 20  Therapeutic Activity  TA 1: Sit to stands x 20 high mat  TA 2: High marching 2 x 10 3 second hold    Time Entry(in minutes):       Assessment & Plan   Assessment: Weakness with TKE noted; progress ROM and muscle strength moving forward. Progress stairs and stepping next treatment as tolerated.       The patient will continue to benefit from skilled outpatient physical therapy in order to address the deficits listed in the problem list on the initial evaluation, provide patient and family education, and maximize the patients level of independence in the home and community environments.     The patient's spiritual, cultural, and educational  needs were considered, and the patient is agreeable to the plan of care and goals.           Plan:      Goals:   Active       LTGs 8 weeks       Patient to improve 30 second sit to stand to 12 reps indicating improved LE strength.        Start:  05/22/25    Expected End:  07/17/25            Patient to improve LE strength to 4+/5 B to improve gait stability.        Start:  05/22/25    Expected End:  07/17/25            Patient to improve L SLS to 10 seconds without increased pain to improve gait stability.        Start:  05/22/25    Expected End:  07/17/25            Patient to walk .25 miles without pain to improve community ambulation.        Start:  05/22/25    Expected End:  07/17/25               STGs 3 weeks       Patient to demonstrate knowledge and understanding of HEP.        Start:  05/22/25    Expected End:  07/17/25            Patient to improve L knee ROM by 10 degrees of flexion.        Start:  05/22/25    Expected End:  07/17/25            Patient to improve L Knee extension to 0 degrees.        Start:  05/22/25    Expected End:  07/17/25            Patient to perform L SLS of 5 seconds without increased pain.        Start:  05/22/25    Expected End:  07/17/25                Vasquez Sanders, PT

## 2025-05-30 ENCOUNTER — CLINICAL SUPPORT (OUTPATIENT)
Dept: REHABILITATION | Facility: HOSPITAL | Age: 60
End: 2025-05-30
Payer: COMMERCIAL

## 2025-05-30 DIAGNOSIS — Z74.09 IMPAIRED FUNCTIONAL MOBILITY, BALANCE, GAIT, AND ENDURANCE: Primary | ICD-10-CM

## 2025-05-30 PROCEDURE — 97530 THERAPEUTIC ACTIVITIES: CPT

## 2025-05-30 PROCEDURE — 97110 THERAPEUTIC EXERCISES: CPT

## 2025-05-30 NOTE — PROGRESS NOTES
Outpatient Rehab    Physical Therapy Visit    Patient Name: Zunilda Alvarez  MRN: [id number]  YOB: 1965  Encounter Date: 5/30/2025    Therapy Diagnosis: No diagnosis found.  Physician: Rashaad Olivarez*    Physician Orders: Eval and Treat  Medical Diagnosis: History of total knee arthroplasty, left    Visit # / Visits Authorized:  2 / 12  Insurance Authorization Period: 5/22/2025 to 12/31/2025  Date of Evaluation: 5/22/2025  Plan of Care Certification: 5/22/2025 to 7/17/2025      PT/PTA:     Number of PTA visits since last PT visit:   Time In:     Time Out:    Total Time (in minutes):     Total Billable Time (in minutes):      FOTO:  Intake Score:  %  Survey Score 2:  %  Survey Score 3:  %    Precautions:       Subjective   Denies adverse response following last treatment. Ready for therapy upon arrival..         Objective            Treatment:  Therapeutic Exercise  TE 1: Knee extension stretch 3 x 90 seconds  TE 2: Heel slides x 20 reps; active and passive  TE 3: SAQ x20 2# 5 second hold  TE 4: LAQ x 20 2#  TE 5: HS peanut squeeze 3 seconds x 20  TE 6: Heel raises x 20  TE 7: Gastroc stretch at wedge 3 x 30 seconds  TE 8: Bike 5 min level 2  TE 9: SL hip ABD 1# x 20  Therapeutic Activity  TA 1: Sit to stands x 24 high mat  TA 2: High marching 2 x 10 3 second hold  TA 3: Small stairs up and down x 5 laps    Time Entry(in minutes):       Assessment & Plan   Assessment: Progressed all interventions without adverse response. ROM nearly restored, Extension deficit remains with ROM and strength. Progress as tolerated moving forward with treatment.       The patient will continue to benefit from skilled outpatient physical therapy in order to address the deficits listed in the problem list on the initial evaluation, provide patient and family education, and maximize the patients level of independence in the home and community environments.     The patient's spiritual, cultural, and educational  needs were considered, and the patient is agreeable to the plan of care and goals.           Plan:      Goals:   Active       LTGs 8 weeks       Patient to improve 30 second sit to stand to 12 reps indicating improved LE strength.        Start:  05/22/25    Expected End:  07/17/25            Patient to improve LE strength to 4+/5 B to improve gait stability.        Start:  05/22/25    Expected End:  07/17/25            Patient to improve L SLS to 10 seconds without increased pain to improve gait stability.        Start:  05/22/25    Expected End:  07/17/25            Patient to walk .25 miles without pain to improve community ambulation.        Start:  05/22/25    Expected End:  07/17/25               STGs 3 weeks       Patient to demonstrate knowledge and understanding of HEP.        Start:  05/22/25    Expected End:  07/17/25            Patient to improve L knee ROM by 10 degrees of flexion.        Start:  05/22/25    Expected End:  07/17/25            Patient to improve L Knee extension to 0 degrees.        Start:  05/22/25    Expected End:  07/17/25            Patient to perform L SLS of 5 seconds without increased pain.        Start:  05/22/25    Expected End:  07/17/25                Vasquez Sanders, PT

## 2025-06-02 ENCOUNTER — CLINICAL SUPPORT (OUTPATIENT)
Dept: REHABILITATION | Facility: HOSPITAL | Age: 60
End: 2025-06-02
Payer: COMMERCIAL

## 2025-06-02 DIAGNOSIS — Z74.09 IMPAIRED FUNCTIONAL MOBILITY, BALANCE, GAIT, AND ENDURANCE: Primary | ICD-10-CM

## 2025-06-02 PROCEDURE — 97530 THERAPEUTIC ACTIVITIES: CPT

## 2025-06-02 PROCEDURE — 97110 THERAPEUTIC EXERCISES: CPT

## 2025-06-03 ENCOUNTER — OFFICE VISIT (OUTPATIENT)
Dept: GASTROENTEROLOGY | Facility: CLINIC | Age: 60
End: 2025-06-03
Payer: COMMERCIAL

## 2025-06-03 VITALS
DIASTOLIC BLOOD PRESSURE: 74 MMHG | BODY MASS INDEX: 38.27 KG/M2 | HEART RATE: 86 BPM | HEIGHT: 64 IN | SYSTOLIC BLOOD PRESSURE: 148 MMHG | WEIGHT: 224.19 LBS

## 2025-06-03 DIAGNOSIS — Z12.11 SCREENING FOR COLON CANCER: ICD-10-CM

## 2025-06-03 DIAGNOSIS — K21.9 GASTROESOPHAGEAL REFLUX DISEASE, UNSPECIFIED WHETHER ESOPHAGITIS PRESENT: Primary | ICD-10-CM

## 2025-06-03 DIAGNOSIS — Z80.0 FAMILY HISTORY OF COLON CANCER: ICD-10-CM

## 2025-06-03 PROCEDURE — 4010F ACE/ARB THERAPY RXD/TAKEN: CPT | Mod: CPTII,S$GLB,,

## 2025-06-03 PROCEDURE — 3077F SYST BP >= 140 MM HG: CPT | Mod: CPTII,S$GLB,,

## 2025-06-03 PROCEDURE — 99213 OFFICE O/P EST LOW 20 MIN: CPT | Mod: S$GLB,,,

## 2025-06-03 PROCEDURE — 1159F MED LIST DOCD IN RCRD: CPT | Mod: CPTII,S$GLB,,

## 2025-06-03 PROCEDURE — 3008F BODY MASS INDEX DOCD: CPT | Mod: CPTII,S$GLB,,

## 2025-06-03 PROCEDURE — 3078F DIAST BP <80 MM HG: CPT | Mod: CPTII,S$GLB,,

## 2025-06-03 PROCEDURE — 99999 PR PBB SHADOW E&M-EST. PATIENT-LVL IV: CPT | Mod: PBBFAC,,,

## 2025-06-03 RX ORDER — OMEPRAZOLE 40 MG/1
40 CAPSULE, DELAYED RELEASE ORAL EVERY MORNING
Qty: 90 CAPSULE | Refills: 1 | Status: SHIPPED | OUTPATIENT
Start: 2025-06-03 | End: 2025-11-30

## 2025-06-03 NOTE — H&P (VIEW-ONLY)
Subjective:       Patient ID: Zunilda Alvarez is a 60 y.o. female Body mass index is 38.49 kg/m².    Chief Complaint: Follow-up (Routine yearly visit and Medication Refill )    This patient is new to me.  Referring Provider: No ref. provider found for GERD.  Established patient of Dr. i[facility].     Gastroesophageal Reflux  She reports no abdominal pain, no belching, no chest pain, no choking, no coughing, no dysphagia, no early satiety, no globus sensation, no heartburn, no hoarse voice or no water brash. This is a chronic problem. The problem occurs occasionally. The heartburn is located in the substernum. The heartburn wakes (occasionally) her from sleep. The heartburn does not limit her activity. The heartburn doesn't change with position. The symptoms are aggravated by certain foods and caffeine. Pertinent negatives include no anemia, fatigue, melena, muscle weakness, orthopnea or weight loss. Patient presents to clinic for yearly eval reports GERD symptoms are significantly controlled on omeprazole 40 mg daily and will take Tagamet p.r.n. for reflux, patient denies any rectal bleeding or melena, denies constipation or diarrhea reports a normal bowel habits last colonoscopy in 2019 patient is due for colon cancer screening, patient reports she recently had a knee replacement a couple weeks ago with Dr. Sher hodges surgical patient reports was advised that prior to any procedures including scopes or dental work she will require antibiotics prior to procedures  Procedure Date: 9/10/2019   Attending MD: Carter Rice MD   Procedure:  Upper GI endoscopy  Impression:   - LA Grade A reflux esophagitis.                         - Chronic gastritis. Biopsied.                         - Normal duodenal bulb, first portion of the                         duodenum and second portion of the duodenum.   Recommendation:       - Discharge patient to home (ambulatory).                         - Resume previous diet.    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 - Helicobacter pylori are not identified on routine staining. - No evidence of intestinal metaplasia, dysplasia or malignancy.   -patient's last colonoscopy was in 2019 with Dr. Rice, patient denies personal history of colon polyps and colon cancer, admits to family history of colon cancer in mother diagnosed in her 50s in maternal uncle. Risk factors include caffeine use and NSAIDs. She has tried a PPI and a histamine-2 antagonist (omeprazole many years started on tagament 2019 after last EGD) for the symptoms. The treatment provided significant relief. Past procedures do not include an abdominal ultrasound, an EGD, esophageal manometry, esophageal pH monitoring, H. pylori antibody titer or a UGI. Past invasive treatments do not include gastroplasty, gastroplication or reflux surgery.     Review of Systems   Constitutional:  Negative for fatigue and weight loss.   HENT:  Negative for hoarse voice.    Respiratory:  Negative for cough and choking.    Cardiovascular:  Negative for chest pain.   Gastrointestinal:  Negative for abdominal distention, abdominal pain, anal bleeding, blood in stool, constipation, diarrhea, dysphagia, heartburn, melena, rectal pain and vomiting.   Musculoskeletal:  Negative for muscle weakness.         No LMP recorded. Patient has had a hysterectomy.  Past Medical History:   Diagnosis Date    Adrenal nodule 09/30/2019    Arthritis     Chondromalacia, left knee 03/02/2023    Depression     Diabetes mellitus type 2, insulin dependent     Diarrhea 09/20/2019    Family history of colon cancer 08/07/2019    Fatty liver 10/13/2020    Gastroesophageal reflux disease 09/10/2019    High cholesterol     Hypertension     Liver cyst 11/04/2021    Palpitations 05/05/2023    Sleep apnea     Sprain of medial collateral ligament of left knee 12/13/2022    Tear of meniscus of  left knee 01/2023     Past Surgical History:   Procedure Laterality Date    ARTHROSCOPIC CHONDROPLASTY OF KNEE JOINT Left 12/14/2023    Procedure: ARTHROSCOPY, KNEE, WITH CHONDROPLASTY;  Surgeon: Ha Chawla DO;  Location: [facility] OR;  Service: Orthopedics;  Laterality: Left;    ARTHROSCOPY OF KNEE Left 03/02/2023    Procedure: ARTHROSCOPY, KNEE;  Surgeon: Ha Chawla DO;  Location: [facility] OR;  Service: Orthopedics;  Laterality: Left;    ARTHROSCOPY OF KNEE Left 12/14/2023    Procedure: ARTHROSCOPY, KNEE;  Surgeon: Ha Chawla DO;  Location: [facility] OR;  Service: Orthopedics;  Laterality: Left;    ARTHROSCOPY OF KNEE WITH ARTHROPLASTY OF PATELLOFEMORAL JOINT Left 03/02/2023    Procedure: ARTHROSCOPY, KNEE, WITH PATELLOFEMORAL;  Surgeon: Ha Chawla DO;  Location: [facility] OR;  Service: Orthopedics;  Laterality: Left;  Chondroplasty    COLONOSCOPY N/A 09/10/2019    Procedure: COLONOSCOPY;  Surgeon: Carter Rice MD;  Location: [facility];  Service: Endoscopy;  Laterality: N/A;    ESOPHAGOGASTRODUODENOSCOPY N/A 09/10/2019    Procedure: EGD (ESOPHAGOGASTRODUODENOSCOPY);  Surgeon: Carter Rice MD;  Location: [facility];  Service: Endoscopy;  Laterality: N/A;    HERNIA REPAIR      HYSTERECTOMY  1998    KNEE ARTHROSCOPY W/ MENISCECTOMY Left 03/02/2023    Procedure: ARTHROSCOPY, KNEE, WITH MENISCECTOMY;  Surgeon: Ha Chawla DO;  Location: [facility] OR;  Service: Orthopedics;  Laterality: Left;  PARTIAL    KNEE ARTHROSCOPY W/ MENISCECTOMY Left 12/14/2023    Procedure: ARTHROSCOPY, KNEE, WITH MENISCECTOMY;  Surgeon: Ha Chawla DO;  Location: [facility] OR;  Service: Orthopedics;  Laterality: Left;    KNEE ARTHROSCOPY W/ PLICA EXCISION Left 03/02/2023    Procedure: EXCISION, PLICA, KNEE, ARTHROSCOPIC;  Surgeon: Ha Chawla DO;  Location: [facility] OR;  Service: Orthopedics;  Laterality: Left;    UPPER GASTROINTESTINAL ENDOSCOPY       Family History   Problem Relation Name Age of Onset    COPD Mother       Diabetes Mother      Hypertension Mother      Colon cancer Mother      Dementia Mother      Heart disease Father      Hypertension Father      COPD Father      Colon cancer Maternal Uncle      Breast cancer Daughter      Ovarian cancer Neg Hx      Crohn's disease Neg Hx      Esophageal cancer Neg Hx      Liver cancer Neg Hx      Stomach cancer Neg Hx      Rectal cancer Neg Hx      Ulcerative colitis Neg Hx       Social History     Tobacco Use    Smoking status: Never     Passive exposure: Never    Smokeless tobacco: Never   Substance Use Topics    Alcohol use: Yes     Comment: rarely    Drug use: No     Wt Readings from Last 10 Encounters:   06/03/25 101.7 kg (224 lb 3.3 oz)   07/08/24 104.3 kg (230 lb)   06/27/24 104 kg (229 lb 4.5 oz)   05/07/24 103.4 kg (227 lb 15.3 oz)   03/28/24 103.4 kg (227 lb 15.3 oz)   12/27/23 103.4 kg (227 lb 15.3 oz)   12/14/23 103.4 kg (228 lb)   11/29/23 103.4 kg (227 lb 15.3 oz)   11/22/23 103.4 kg (228 lb)   11/15/23 103.4 kg (228 lb)     Lab Results   Component Value Date    WBC 6.5 01/17/2024    HGB 12.8 01/17/2024    HCT 37.7 01/17/2024    MCV 92.0 01/17/2024     01/17/2024     CMP  Sodium   Date Value Ref Range Status   01/17/2024 142 135 - 146 mmol/L Final     Potassium   Date Value Ref Range Status   01/17/2024 4.4 3.5 - 5.3 mmol/L Final     Chloride   Date Value Ref Range Status   01/17/2024 104 98 - 110 mmol/L Final     CO2   Date Value Ref Range Status   01/17/2024 30 20 - 32 mmol/L Final     Glucose   Date Value Ref Range Status   01/17/2024 86 65 - 99 mg/dL Final     Comment:                   Fasting reference interval          BUN   Date Value Ref Range Status   01/17/2024 12 7 - 25 mg/dL Final     Creatinine   Date Value Ref Range Status   01/17/2024 0.47 (L) 0.50 - 1.03 mg/dL Final     Calcium   Date Value Ref Range Status   01/17/2024 9.3 8.6 - 10.4 mg/dL Final     Total Protein   Date Value Ref Range Status   01/17/2024 6.7 6.1 - 8.1 g/dL Final     Albumin  "  Date Value Ref Range Status   01/17/2024 4.2 3.6 - 5.1 g/dL Final     Total Bilirubin   Date Value Ref Range Status   01/17/2024 0.3 0.2 - 1.2 mg/dL Final     Alkaline Phosphatase   Date Value Ref Range Status   11/22/2023 70 55 - 135 U/L Final     AST   Date Value Ref Range Status   01/17/2024 16 10 - 35 U/L Final     ALT   Date Value Ref Range Status   01/17/2024 17 6 - 29 U/L Final     Anion Gap   Date Value Ref Range Status   12/07/2023 12 8 - 16 mmol/L Final     eGFR if    Date Value Ref Range Status   05/10/2022 116 > OR = 60 mL/min/1.73m2 Final     eGFR if non    Date Value Ref Range Status   05/10/2022 100 > OR = 60 mL/min/1.73m2 Final     Lab Results   Component Value Date    LIPASE 23 11/22/2023     No results found for: "LIPASERES"  Lab Results   Component Value Date    TSH 2.38 01/17/2024       Reviewed prior medical records including radiology report of CT abdomen 11/17/2021, ultrasound abdomen 11/04/2021 & endoscopy history (see surgical history/procedures).    Objective:      Physical Exam  Vitals and nursing note reviewed.   Constitutional:       Appearance: Normal appearance. She is normal weight.   Cardiovascular:      Rate and Rhythm: Normal rate and regular rhythm.      Heart sounds: Normal heart sounds.   Pulmonary:      Breath sounds: Normal breath sounds.   Abdominal:      General: Bowel sounds are normal.      Palpations: Abdomen is soft.      Tenderness: There is no abdominal tenderness.   Skin:     General: Skin is warm and dry.      Coloration: Skin is not jaundiced.   Neurological:      Mental Status: She is alert and oriented to person, place, and time.   Psychiatric:         Mood and Affect: Mood normal.         Behavior: Behavior normal.         Assessment:       1. Gastroesophageal reflux disease, unspecified whether esophagitis present    2. Screening for colon cancer          Plan:       Gastroesophageal reflux disease, unspecified whether " esophagitis present  - CONTINUE    omeprazole (PRILOSEC) 40 MG capsule; Take 1 capsule (40 mg total) by mouth every morning.  Dispense: 90 capsule; Refill: 1   -Take PPI 30min-1hr before eating breakfast  -Educated patient on lifestyle modifications to help control/reduce reflux/abdominal pain including: avoid large meals, avoid eating within 2-3 hours of bedtime (avoid late night eating & lying down soon after eating), elevate head of bed if nocturnal symptoms are present, smoking cessation (if current smoker), & weight loss (if overweight).   -Educated to avoid known foods which trigger reflux symptoms & to minimize/avoid high-fat foods, chocolate, caffeine, citrus, alcohol, & tomato products.  -Advised to avoid/limit use of NSAID's, since they can cause GI upset, bleeding, and/or ulcers. If needed, take with food.     Screening for colon cancer, Family history of colon cancer  -     Case Request Endoscopy: COLONOSCOPY, SCREENING, HIGH RISK PATIENT   - schedule Colonoscopy, discussed procedure with the patient, including risks and benefits, patient verbalized understanding   -Nurse to call [place] surgical staff for further information of antibiotics recommended  prior to colonoscopy         Follow up if symptoms worsen or fail to improve.      If no improvement in symptoms or symptoms worsen, call/follow-up at clinic or go to ER.       [facility] - GASTROENTEROLOGY  [facility], [place]     Dictation software program was used for this note. Please expect some simple typographical  errors in this note.    Encounter includes face to face time and non-face to face time preparing to see the patient (eg, review of tests), obtaining and/or reviewing separately obtained history, documenting clinical information in the electronic or other health record, independently interpreting results (not separately reported) and communicating results to the patient/family/caregiver, or care  coordination (not separately reported).

## 2025-06-04 ENCOUNTER — CLINICAL SUPPORT (OUTPATIENT)
Dept: REHABILITATION | Facility: HOSPITAL | Age: 60
End: 2025-06-04
Payer: COMMERCIAL

## 2025-06-04 DIAGNOSIS — Z74.09 IMPAIRED FUNCTIONAL MOBILITY, BALANCE, GAIT, AND ENDURANCE: Primary | ICD-10-CM

## 2025-06-04 PROCEDURE — 97530 THERAPEUTIC ACTIVITIES: CPT

## 2025-06-04 PROCEDURE — 97110 THERAPEUTIC EXERCISES: CPT

## 2025-06-09 ENCOUNTER — CLINICAL SUPPORT (OUTPATIENT)
Dept: REHABILITATION | Facility: HOSPITAL | Age: 60
End: 2025-06-09
Payer: COMMERCIAL

## 2025-06-09 DIAGNOSIS — Z74.09 IMPAIRED FUNCTIONAL MOBILITY, BALANCE, GAIT, AND ENDURANCE: Primary | ICD-10-CM

## 2025-06-09 PROCEDURE — 97530 THERAPEUTIC ACTIVITIES: CPT

## 2025-06-09 PROCEDURE — 97110 THERAPEUTIC EXERCISES: CPT

## 2025-06-09 NOTE — PROGRESS NOTES
Outpatient Rehab    Physical Therapy Visit    Patient Name: Zunilda Alvarez  MRN: [id number]  YOB: 1965  Encounter Date: 6/9/2025    Therapy Diagnosis:   Encounter Diagnosis   Name Primary?    Impaired functional mobility, balance, gait, and endurance Yes     Physician: Rashaad Olivarez*    Physician Orders: Eval and Treat  Medical Diagnosis: History of total knee arthroplasty, left  Surgical Diagnosis: Not applicable for this Episode   Surgical Date: Not applicable for this Episode    Visit # / Visits Authorized:  5 / 12  Insurance Authorization Period: 5/22/2025 to 12/31/2025  Date of Evaluation: 5/22/2025  Plan of Care Certification: 5/22/2025 to 7/17/2025      PT/PTA:     Number of PTA visits since last PT visit:   Time In: 1330   Time Out: 1430  Total Time (in minutes): 60   Total Billable Time (in minutes):      FOTO:  Intake Score:  %  Survey Score 2:  %  Survey Score 3:  %    Precautions:       Subjective   Denies adverse response following last treatment. Ready for therapy upon arrival..         Objective            Treatment:  Therapeutic Exercise  TE 1: Knee extension stretch 3 x 2 min 4#  TE 2: Heel slides x 20 reps; active and passive  TE 3: SAQ x24 3# 5 second hold  TE 4: LAQ x 24 3#  TE 5: HS peanut squeeze 3 seconds x 20  TE 6: Heel raises x 20  TE 7: Gastroc stretch at wedge 3 x 30 seconds  TE 8: Bike 8 min level 3.0  TE 9: Quad sets x 2 min  TE 10: SLR x 20  Therapeutic Activity  TA 1: Sit to stands x 24 low mat  TA 2: High marching 2 x 10 3 second hold  TA 3: Small stairs up and down x 10 laps  TA 4: Big stairs up x 10  TA 5: Stair taps x20    Time Entry(in minutes):  Therapeutic Activity Time Entry: 20  Therapeutic Exercise Time Entry: 40    Assessment & Plan   Assessment: Progressing well; ROM nearly normalized. Extension deficit in strength remains.       The patient will continue to benefit from skilled outpatient physical therapy in order to address the deficits  listed in the problem list on the initial evaluation, provide patient and family education, and maximize the patients level of independence in the home and community environments.     The patient's spiritual, cultural, and educational needs were considered, and the patient is agreeable to the plan of care and goals.           Plan:      Goals:   Active       LTGs 8 weeks       Patient to improve 30 second sit to stand to 12 reps indicating improved LE strength.        Start:  05/22/25    Expected End:  07/17/25            Patient to improve LE strength to 4+/5 B to improve gait stability.        Start:  05/22/25    Expected End:  07/17/25            Patient to improve L SLS to 10 seconds without increased pain to improve gait stability.        Start:  05/22/25    Expected End:  07/17/25            Patient to walk .25 miles without pain to improve community ambulation.        Start:  05/22/25    Expected End:  07/17/25               STGs 3 weeks       Patient to demonstrate knowledge and understanding of HEP.        Start:  05/22/25    Expected End:  07/17/25            Patient to improve L knee ROM by 10 degrees of flexion.        Start:  05/22/25    Expected End:  07/17/25            Patient to improve L Knee extension to 0 degrees.        Start:  05/22/25    Expected End:  07/17/25            Patient to perform L SLS of 5 seconds without increased pain.        Start:  05/22/25    Expected End:  07/17/25                Vasquez Sanders PT

## 2025-06-11 ENCOUNTER — CLINICAL SUPPORT (OUTPATIENT)
Dept: REHABILITATION | Facility: HOSPITAL | Age: 60
End: 2025-06-11
Payer: COMMERCIAL

## 2025-06-11 DIAGNOSIS — Z74.09 IMPAIRED FUNCTIONAL MOBILITY, BALANCE, GAIT, AND ENDURANCE: Primary | ICD-10-CM

## 2025-06-11 PROCEDURE — 97530 THERAPEUTIC ACTIVITIES: CPT

## 2025-06-11 PROCEDURE — 97110 THERAPEUTIC EXERCISES: CPT

## 2025-06-11 NOTE — PROGRESS NOTES
Outpatient Rehab    Physical Therapy Visit    Patient Name: Zunilda Alvarez  MRN: [id number]  YOB: 1965  Encounter Date: 6/11/2025    Therapy Diagnosis: No diagnosis found.  Physician: Rashaad Olivarez*    Physician Orders: Eval and Treat  Medical Diagnosis: History of total knee arthroplasty, left  Surgical Diagnosis: L TKA   Surgical Date: Not applicable for this Episode    Visit # / Visits Authorized:  6 / 12  Insurance Authorization Period: 5/22/2025 to 12/31/2025  Date of Evaluation: 5/22/2025  Plan of Care Certification: 5/22/2025 to 7/17/2025      PT/PTA:     Number of PTA visits since last PT visit:   Time In: 1330   Time Out: 1430  Total Time (in minutes): 60   Total Billable Time (in minutes):      FOTO:  Intake Score:  %  Survey Score 2:  %  Survey Score 3:  %    Precautions:       Subjective   Denies adverse response following last treatment; ready for therapy and able to walk further distances with dog..         Objective            Treatment:  Therapeutic Exercise  TE 1: Knee extension stretch 3 x 2 min 4#  TE 3: SAQ x24 3# 5 second hold  TE 4: LAQ x 24 3#  TE 5: HS peanut squeeze 3 seconds x 20  TE 6: Heel raises x 20  TE 7: Gastroc stretch at wedge 3 x 30 seconds  TE 8: Bike 8 min level 4.0  TE 9: Quad sets x 2 min  TE 10: SLR x 20  Therapeutic Activity  TA 1: Sit to stands x 24 low mat  TA 2: High marching 2 x 10 3 second hold  TA 3: Small stairs up and down x 10 laps  TA 4: Big stairs up x 10  TA 5: Stair taps x20    Time Entry(in minutes):  Therapeutic Activity Time Entry: 20  Therapeutic Exercise Time Entry: 40    Assessment & Plan   Assessment: Progressing well towards goals at this time. Progress balance and stairs as tolerated as weakness and instability remains.       The patient will continue to benefit from skilled outpatient physical therapy in order to address the deficits listed in the problem list on the initial evaluation, provide patient and family education,  and maximize the patients level of independence in the home and community environments.     The patient's spiritual, cultural, and educational needs were considered, and the patient is agreeable to the plan of care and goals.           Plan:      Goals:   Active       LTGs 8 weeks       Patient to improve 30 second sit to stand to 12 reps indicating improved LE strength.        Start:  05/22/25    Expected End:  07/17/25            Patient to improve LE strength to 4+/5 B to improve gait stability.        Start:  05/22/25    Expected End:  07/17/25            Patient to improve L SLS to 10 seconds without increased pain to improve gait stability.        Start:  05/22/25    Expected End:  07/17/25            Patient to walk .25 miles without pain to improve community ambulation.        Start:  05/22/25    Expected End:  07/17/25               STGs 3 weeks       Patient to demonstrate knowledge and understanding of HEP.        Start:  05/22/25    Expected End:  07/17/25            Patient to improve L knee ROM by 10 degrees of flexion.        Start:  05/22/25    Expected End:  07/17/25            Patient to improve L Knee extension to 0 degrees.        Start:  05/22/25    Expected End:  07/17/25            Patient to perform L SLS of 5 seconds without increased pain.        Start:  05/22/25    Expected End:  07/17/25                Vasquez Sanders, PT

## 2025-06-16 ENCOUNTER — CLINICAL SUPPORT (OUTPATIENT)
Dept: REHABILITATION | Facility: HOSPITAL | Age: 60
End: 2025-06-16
Payer: COMMERCIAL

## 2025-06-16 DIAGNOSIS — Z74.09 IMPAIRED FUNCTIONAL MOBILITY, BALANCE, GAIT, AND ENDURANCE: Primary | ICD-10-CM

## 2025-06-16 PROCEDURE — 97110 THERAPEUTIC EXERCISES: CPT

## 2025-06-16 PROCEDURE — 97530 THERAPEUTIC ACTIVITIES: CPT

## 2025-06-16 NOTE — PROGRESS NOTES
Outpatient Rehab    Physical Therapy Visit    Patient Name: Zunilda Alvarez  MRN: [id number]  YOB: 1965  Encounter Date: 6/16/2025    Therapy Diagnosis:   Encounter Diagnosis   Name Primary?    Impaired functional mobility, balance, gait, and endurance Yes     Physician: Rashaad Olivarez*    Physician Orders: Eval and Treat  Medical Diagnosis: History of total knee arthroplasty, left  Surgical Diagnosis: L TKA   Surgical Date: Not applicable for this Episode  Days Since Last Surgery: Not applicable for this Episode    Visit # / Visits Authorized:  7 / 12  Insurance Authorization Period: 5/22/2025 to 12/31/2025  Date of Evaluation: 5/22/2025  Plan of Care Certification: 5/22/2025 to 7/17/2025      PT/PTA:     Number of PTA visits since last PT visit:   Time In: 1330   Time Out: 1424  Total Time (in minutes): 54   Total Billable Time (in minutes):      FOTO:  Intake Score:  %  Survey Score 2:  %  Survey Score 3:  %    Precautions:       Subjective   Wore shoes with an increased heel; soreness in back of knee noted..         Objective            Treatment:  Therapeutic Exercise  TE 1: Knee extension stretch 3 x 2 min 4#  TE 3: SAQ x24 3# 5 second hold  TE 4: LAQ x 24 3#  TE 5: HS peanut squeeze 3 seconds x 20  TE 6: Heel raises x 20  TE 7: Gastroc stretch at wedge 3 x 30 seconds  TE 8: Bike 8 min level 4.0  TE 9: Quad sets x 2 min  TE 10: SLR x 20  Therapeutic Activity  TA 1: Sit to stands x 24 low mat  TA 2: High marching 2 x 10 3 second hold  TA 3: Stair laps x 3 min  TA 4: Lateral step overs x 3 laps  TA 5: Stair taps x20    Time Entry(in minutes):  Therapeutic Activity Time Entry: 24  Therapeutic Exercise Time Entry: 30    Assessment & Plan   Assessment: Progressed stair negotiation without adverse response. Progress balance and strength as tolerated as limitations in strength and ROM remain at this time.        The patient will continue to benefit from skilled outpatient physical therapy  in order to address the deficits listed in the problem list on the initial evaluation, provide patient and family education, and maximize the patients level of independence in the home and community environments.     The patient's spiritual, cultural, and educational needs were considered, and the patient is agreeable to the plan of care and goals.           Plan:      Goals:   Active       LTGs 8 weeks       Patient to improve 30 second sit to stand to 12 reps indicating improved LE strength.        Start:  05/22/25    Expected End:  07/17/25            Patient to improve LE strength to 4+/5 B to improve gait stability.        Start:  05/22/25    Expected End:  07/17/25            Patient to improve L SLS to 10 seconds without increased pain to improve gait stability.        Start:  05/22/25    Expected End:  07/17/25            Patient to walk .25 miles without pain to improve community ambulation.        Start:  05/22/25    Expected End:  07/17/25               STGs 3 weeks       Patient to demonstrate knowledge and understanding of HEP.        Start:  05/22/25    Expected End:  07/17/25            Patient to improve L knee ROM by 10 degrees of flexion.        Start:  05/22/25    Expected End:  07/17/25            Patient to improve L Knee extension to 0 degrees.        Start:  05/22/25    Expected End:  07/17/25            Patient to perform L SLS of 5 seconds without increased pain.        Start:  05/22/25    Expected End:  07/17/25                Vasquez Sanders, PT

## 2025-06-18 ENCOUNTER — CLINICAL SUPPORT (OUTPATIENT)
Dept: REHABILITATION | Facility: HOSPITAL | Age: 60
End: 2025-06-18
Payer: COMMERCIAL

## 2025-06-18 DIAGNOSIS — Z74.09 IMPAIRED FUNCTIONAL MOBILITY, BALANCE, GAIT, AND ENDURANCE: Primary | ICD-10-CM

## 2025-06-18 PROCEDURE — 97530 THERAPEUTIC ACTIVITIES: CPT

## 2025-06-18 PROCEDURE — 97110 THERAPEUTIC EXERCISES: CPT

## 2025-06-18 NOTE — PROGRESS NOTES
Outpatient Rehab    Physical Therapy Visit    Patient Name: Zunilda Alvarez  MRN: [id number]  YOB: 1965  Encounter Date: 6/18/2025    Therapy Diagnosis:   Encounter Diagnosis   Name Primary?    Impaired functional mobility, balance, gait, and endurance Yes     Physician: Rashaad Olivarez*    Physician Orders: Eval and Treat  Medical Diagnosis: History of total knee arthroplasty, left  Surgical Diagnosis: L TKA   Surgical Date: Not applicable for this Episode  Days Since Last Surgery: Not applicable for this Episode    Visit # / Visits Authorized:  8 / 12  Insurance Authorization Period: 5/22/2025 to 12/31/2025  Date of Evaluation: 5/22/2025  Plan of Care Certification: 5/22/2025 to 7/17/2025      PT/PTA:     Number of PTA visits since last PT visit:   Time In: 1330   Time Out: 1455  Total Time (in minutes): 85   Total Billable Time (in minutes):      FOTO:  Intake Score:  %  Survey Score 2:  %  Survey Score 3:  %    Precautions:       Subjective   Denies adverse response following last treatment. Ready for therapy upon arrival..         Objective            Treatment:  Therapeutic Exercise  TE 1: Knee extension stretch 3 x 2 min 5#  TE 3: SAQ x24 4# 5 second hold  TE 4: LAQ x 24 4#  TE 5: HS peanut squeeze 3 seconds x 20  TE 6: Heel raises x 20  TE 7: Gastroc stretch at wedge 3 x 30 seconds  TE 8: Bike 8 min level 4.0  TE 9: Quad sets x 2 min  TE 10: SLR x 20  Therapeutic Activity  TA 1: Sit to stands x 30 low mat  TA 2: High marching 2 x 10 3 second hold  TA 3: Stair laps 6 laps  TA 5: Stair taps 2 x 20    Time Entry(in minutes):  Therapeutic Activity Time Entry: 15  Therapeutic Exercise Time Entry: 40    Assessment & Plan   Assessment: Progressing well at this time; continue to progress strength and mobility as tolerated to improve gait stability and functional mobility. ROM improving nicely.        The patient will continue to benefit from skilled outpatient physical therapy in order to  address the deficits listed in the problem list on the initial evaluation, provide patient and family education, and maximize the patients level of independence in the home and community environments.     The patient's spiritual, cultural, and educational needs were considered, and the patient is agreeable to the plan of care and goals.           Plan:      Goals:   Active       LTGs 8 weeks       Patient to improve 30 second sit to stand to 12 reps indicating improved LE strength.        Start:  05/22/25    Expected End:  07/17/25            Patient to improve LE strength to 4+/5 B to improve gait stability.        Start:  05/22/25    Expected End:  07/17/25            Patient to improve L SLS to 10 seconds without increased pain to improve gait stability.        Start:  05/22/25    Expected End:  07/17/25            Patient to walk .25 miles without pain to improve community ambulation.        Start:  05/22/25    Expected End:  07/17/25               STGs 3 weeks       Patient to demonstrate knowledge and understanding of HEP.        Start:  05/22/25    Expected End:  07/17/25            Patient to improve L knee ROM by 10 degrees of flexion.        Start:  05/22/25    Expected End:  07/17/25            Patient to improve L Knee extension to 0 degrees.        Start:  05/22/25    Expected End:  07/17/25            Patient to perform L SLS of 5 seconds without increased pain.        Start:  05/22/25    Expected End:  07/17/25                Vasquez Sanders, PT

## 2025-06-19 ENCOUNTER — PATIENT MESSAGE (OUTPATIENT)
Dept: GASTROENTEROLOGY | Facility: CLINIC | Age: 60
End: 2025-06-19
Payer: COMMERCIAL

## 2025-06-27 ENCOUNTER — CLINICAL SUPPORT (OUTPATIENT)
Dept: REHABILITATION | Facility: HOSPITAL | Age: 60
End: 2025-06-27
Payer: COMMERCIAL

## 2025-06-27 DIAGNOSIS — Z74.09 IMPAIRED FUNCTIONAL MOBILITY, BALANCE, GAIT, AND ENDURANCE: Primary | ICD-10-CM

## 2025-06-27 PROCEDURE — 97110 THERAPEUTIC EXERCISES: CPT

## 2025-06-27 PROCEDURE — 97530 THERAPEUTIC ACTIVITIES: CPT

## 2025-07-01 NOTE — PROGRESS NOTES
Outpatient Rehab    Physical Therapy Visit    Patient Name: Zunilda Alvarez  MRN: [id number]  YOB: 1965  Encounter Date: 6/27/2025    Therapy Diagnosis:   Encounter Diagnosis   Name Primary?    Impaired functional mobility, balance, gait, and endurance Yes     Physician: Rashaad Olivarez*    Physician Orders: Eval and Treat  Medical Diagnosis: History of total knee arthroplasty, left  Surgical Diagnosis: L TKA   Surgical Date: Not applicable for this Episode  Days Since Last Surgery: Not applicable for this Episode    Visit # / Visits Authorized:  9 / 12  Insurance Authorization Period: 5/22/2025 to 12/31/2025  Date of Evaluation: 5/22/2025  Plan of Care Certification: 5/22/2025 to 7/17/2025      PT/PTA:     Number of PTA visits since last PT visit:   Time In: 1330   Time Out: 1400  Total Time (in minutes): 30   Total Billable Time (in minutes):      FOTO:  Intake Score:  %  Survey Score 2:  %  Survey Score 3:  %    Precautions:       Subjective   Pt reports feeling pretty good today..  Pain reported as 0/10.      Objective            Treatment:  Therapeutic Exercise  TE 1: Knee extension stretch 3 x 2 min 5#  TE 2: Heel slides x 20 reps; active and passive  TE 3: SAQ x24 4# 5 second hold  TE 4: LAQ x 24 4#  TE 5: HS peanut squeeze 3 seconds x 20  TE 6: Heel raises x 20  TE 7: Gastroc stretch at wedge 3 x 30 seconds  TE 8: Bike 8 min level 4.0  TE 9: Quad sets x 2 min  TE 10: SLR x 20 4#  Therapeutic Activity  TA 1: Sit to stands x 20 low mat  TA 2: High marching 2 x 10 3 second hold  TA 3: Stair laps 6 laps  TA 5: Stair taps 2 x 20    Time Entry(in minutes):  Therapeutic Activity Time Entry: 15  Therapeutic Exercise Time Entry: 15    Assessment & Plan   Assessment: Pt is doing great with treatment at this time. Pt has good ROM and strength and will be safe for DC soon.   Evaluation/Treatment Tolerance: Patient tolerated treatment well    The patient will continue to benefit from skilled  outpatient physical therapy in order to address the deficits listed in the problem list on the initial evaluation, provide patient and family education, and maximize the patients level of independence in the home and community environments.     The patient's spiritual, cultural, and educational needs were considered, and the patient is agreeable to the plan of care and goals.           Plan:      Goals:   Active       LTGs 8 weeks       Patient to improve 30 second sit to stand to 12 reps indicating improved LE strength.        Start:  05/22/25    Expected End:  07/17/25            Patient to improve LE strength to 4+/5 B to improve gait stability.        Start:  05/22/25    Expected End:  07/17/25            Patient to improve L SLS to 10 seconds without increased pain to improve gait stability.        Start:  05/22/25    Expected End:  07/17/25            Patient to walk .25 miles without pain to improve community ambulation.        Start:  05/22/25    Expected End:  07/17/25               STGs 3 weeks       Patient to demonstrate knowledge and understanding of HEP.        Start:  05/22/25    Expected End:  07/17/25            Patient to improve L knee ROM by 10 degrees of flexion.        Start:  05/22/25    Expected End:  07/17/25            Patient to improve L Knee extension to 0 degrees.        Start:  05/22/25    Expected End:  07/17/25            Patient to perform L SLS of 5 seconds without increased pain.        Start:  05/22/25    Expected End:  07/17/25                Sonam Reilly, PT

## 2025-07-03 ENCOUNTER — ANESTHESIA EVENT (OUTPATIENT)
Dept: ENDOSCOPY | Facility: HOSPITAL | Age: 60
End: 2025-07-03
Payer: COMMERCIAL

## 2025-07-03 ENCOUNTER — HOSPITAL ENCOUNTER (OUTPATIENT)
Facility: HOSPITAL | Age: 60
Discharge: HOME | End: 2025-07-03
Attending: INTERNAL MEDICINE | Admitting: INTERNAL MEDICINE
Payer: COMMERCIAL

## 2025-07-03 ENCOUNTER — ANESTHESIA (OUTPATIENT)
Dept: ENDOSCOPY | Facility: HOSPITAL | Age: 60
End: 2025-07-03
Payer: COMMERCIAL

## 2025-07-03 VITALS
SYSTOLIC BLOOD PRESSURE: 141 MMHG | HEART RATE: 64 BPM | DIASTOLIC BLOOD PRESSURE: 58 MMHG | OXYGEN SATURATION: 99 % | RESPIRATION RATE: 21 BRPM | TEMPERATURE: 98 F

## 2025-07-03 DIAGNOSIS — Z12.11 SCREENING FOR COLON CANCER: ICD-10-CM

## 2025-07-03 PROCEDURE — 37000008 HC ANESTHESIA 1ST 15 MINUTES: Performed by: INTERNAL MEDICINE

## 2025-07-03 PROCEDURE — 37000009 HC ANESTHESIA EA ADD 15 MINS: Performed by: INTERNAL MEDICINE

## 2025-07-03 PROCEDURE — 63600175 PHARM REV CODE 636 W HCPCS: Performed by: NURSE ANESTHETIST, CERTIFIED REGISTERED

## 2025-07-03 PROCEDURE — 45385 COLONOSCOPY W/LESION REMOVAL: CPT | Mod: 33 | Performed by: INTERNAL MEDICINE

## 2025-07-03 PROCEDURE — 45385 COLONOSCOPY W/LESION REMOVAL: CPT | Mod: 33,,, | Performed by: INTERNAL MEDICINE

## 2025-07-03 PROCEDURE — 27201089 HC SNARE, DISP (ANY): Performed by: INTERNAL MEDICINE

## 2025-07-03 PROCEDURE — 25000003 PHARM REV CODE 250: Performed by: INTERNAL MEDICINE

## 2025-07-03 RX ORDER — LIDOCAINE HYDROCHLORIDE 20 MG/ML
INJECTION INTRAVENOUS
Status: DISCONTINUED | OUTPATIENT
Start: 2025-07-03 | End: 2025-07-03

## 2025-07-03 RX ORDER — TIRZEPATIDE 15 MG/.5ML
15 INJECTION, SOLUTION SUBCUTANEOUS
COMMUNITY

## 2025-07-03 RX ORDER — LORATADINE 10 MG/1
10 TABLET ORAL DAILY
COMMUNITY

## 2025-07-03 RX ORDER — ATORVASTATIN CALCIUM 10 MG/1
10 TABLET, FILM COATED ORAL DAILY
COMMUNITY

## 2025-07-03 RX ORDER — PROPOFOL 10 MG/ML
VIAL (ML) INTRAVENOUS
Status: DISCONTINUED | OUTPATIENT
Start: 2025-07-03 | End: 2025-07-03

## 2025-07-03 RX ORDER — SODIUM CHLORIDE 9 MG/ML
INJECTION, SOLUTION INTRAVENOUS CONTINUOUS
Status: DISCONTINUED | OUTPATIENT
Start: 2025-07-03 | End: 2025-07-03 | Stop reason: HOSPADM

## 2025-07-03 RX ORDER — PHENYLEPHRINE HYDROCHLORIDE 10 MG/ML
INJECTION INTRAVENOUS
Status: DISCONTINUED | OUTPATIENT
Start: 2025-07-03 | End: 2025-07-03

## 2025-07-03 RX ADMIN — PROPOFOL 50 MG: 10 INJECTION, EMULSION INTRAVENOUS at 10:07

## 2025-07-03 RX ADMIN — GLYCOPYRROLATE 0.2 MG: 0.2 INJECTION, SOLUTION INTRAMUSCULAR; INTRAVITREAL at 10:07

## 2025-07-03 RX ADMIN — PROPOFOL 100 MG: 10 INJECTION, EMULSION INTRAVENOUS at 10:07

## 2025-07-03 RX ADMIN — LIDOCAINE HYDROCHLORIDE 100 MG: 20 INJECTION, SOLUTION INTRAVENOUS at 10:07

## 2025-07-03 RX ADMIN — PHENYLEPHRINE HYDROCHLORIDE 200 MCG: 10 INJECTION INTRAVENOUS at 10:07

## 2025-07-03 RX ADMIN — SODIUM CHLORIDE: 9 INJECTION, SOLUTION INTRAVENOUS at 09:07

## 2025-07-03 NOTE — TRANSFER OF CARE
Anesthesia Transfer of Care Note    Patient: Zunilda Alvarez    Procedure(s) Performed: Procedure(s) (LRB):  COLONOSCOPY, SCREENING, HIGH RISK PATIENT(Ask Dr Rider about antibiotic per pt-knee surg 6 weeks ago 6/19) (N/A)    Patient location: PACU    Anesthesia Type: general    Transport from OR: Transported from OR on 2-3 L/min O2 by NC with adequate spontaneous ventilation    Post pain: adequate analgesia    Post assessment: no apparent anesthetic complications and tolerated procedure well    Post vital signs: stable    Level of consciousness: sedated and responds to stimulation    Nausea/Vomiting: no nausea/vomiting    Complications: none    Transfer of care protocol was followed    Last vitals: Visit Vitals  BP (!) 151/63 (BP Location: Left arm, Patient Position: Lying)   Pulse 66   Temp 36.7 °C (98.1 °F) (Skin)   Resp 18   SpO2 97%

## 2025-07-03 NOTE — PLAN OF CARE
Vss, ruslan po fluids, denies pain, ambulates easily. IV removed, catheter intact. Discharge instructions provided and states understanding. States ready to go home.  Discharged from facility with family per wheelchair.

## 2025-07-03 NOTE — ANESTHESIA POSTPROCEDURE EVALUATION
Anesthesia Post Evaluation    Patient: Zunilda Alavrez    Procedure(s) Performed: Procedure(s) (LRB):  COLONOSCOPY, SCREENING, HIGH RISK PATIENT(Ask Dr Rider about antibiotic per pt-knee surg 6 weeks ago 6/19) (N/A)    Final Anesthesia Type: general      Patient location during evaluation: PACU  Patient participation: Yes- Able to Participate  Level of consciousness: awake and alert and oriented  Post-procedure vital signs: reviewed and stable  Pain management: adequate  Airway patency: patent    PONV status at discharge: No PONV  Anesthetic complications: no      Cardiovascular status: blood pressure returned to baseline and stable  Respiratory status: unassisted and spontaneous ventilation  Hydration status: euvolemic  Follow-up not needed.              Vitals Value Taken Time   BP 99/51 07/03/25 10:50   Temp   07/03/25 10:52   Pulse 66 07/03/25 10:51   Resp 34 07/03/25 10:51   SpO2 96 % 07/03/25 10:51   Vitals shown include unfiled device data.      No case tracking events are documented in the log.      Pain/Jazlyn Score: No data recorded

## 2025-07-03 NOTE — PROVATION PATIENT INSTRUCTIONS
Discharge Summary/Instructions after an Endoscopic Procedure  Patient Name: Zunilda Alvarez  Patient MRN: [id number]  Patient YOB: 1965  Thursday, July 3, 2025  Jesika Rider MD  Dear patient,  As a result of recent federal legislation (The Federal Cures Act), you may   receive lab or pathology results from your procedure in your [facility]   account before your physician is able to contact you. Your physician or   their representative will relay the results to you with their   recommendations at their soonest availability.  Thank you,  RESTRICTIONS:  During your procedure today, you received medications for sedation.  These   medications may affect your judgment, balance and coordination.  Therefore,   for 24 hours, you have the following restrictions:   - DO NOT drive a car, operate machinery, make legal/financial decisions,   sign important papers or drink alcohol.    ACTIVITY:  Today: no heavy lifting, straining or running due to procedural   sedation/anesthesia.  The following day: return to full activity including work.  DIET:  Eat and drink normally unless instructed otherwise.     TREATMENT FOR COMMON SIDE EFFECTS:  - Mild abdominal pain, nausea, belching, bloating or excessive gas:  rest,   eat lightly and use a heating pad.  - Sore Throat: treat with throat lozenges and/or gargle with warm salt   water.  - Because air was used during the procedure, expelling large amounts of air   from your rectum or belching is normal.  - If a bowel prep was taken, you may not have a bowel movement for 1-3 days.    This is normal.  SYMPTOMS TO WATCH FOR AND REPORT TO YOUR PHYSICIAN:  1. Abdominal pain or bloating, other than gas cramps.  2. Chest pain.  3. Back pain.  4. Signs of infection such as: chills or fever occurring within 24 hours   after the procedure.  5. Rectal bleeding, which would show as bright red, maroon, or black stools.   (A tablespoon of blood from the rectum is not serious,  especially if   hemorrhoids are present.)  6. Vomiting.  7. Weakness or dizziness.  GO DIRECTLY TO THE NEAREST EMERGENCY ROOM IF YOU HAVE ANY OF THE FOLLOWING:      Difficulty breathing              Chills and/or fever over 101 F   Persistent vomiting and/or vomiting blood   Severe abdominal pain   Severe chest pain   Black, tarry stools   Bleeding- more than one tablespoon   Any other symptom or condition that you feel may need urgent attention  Your doctor recommends these additional instructions:  If any biopsies were taken, your doctors clinic will contact you in 1 to 2   weeks with any results.  - Discharge patient to home (with escort).   - Patient has a contact number available for emergencies.  The signs and   symptoms of potential delayed complications were discussed with the   patient.  Return to normal activities tomorrow.  Written discharge   instructions were provided to the patient.   - Resume previous diet.   - Continue present medications.   - Await pathology results.   - Repeat colonoscopy in 5 years for surveillance based on pathology results.     - Return to my office PRN.  For questions, problems or results please call your physician - Jesika Rider MD at Work:  [phone].  [facility], EMERGENCY ROOM PHONE NUMBER: [phone]  IF A COMPLICATION OR EMERGENCY SITUATION ARISES AND YOU ARE UNABLE TO REACH   YOUR PHYSICIAN - GO DIRECTLY TO THE EMERGENCY ROOM.  Jesika Rider MD  7/3/2025 10:45:33 AM  This report has been verified and signed electronically.  Dear patient,  As a result of recent federal legislation (The Federal Cures Act), you may   receive lab or pathology results from your procedure in your [facility]   account before your physician is able to contact you. Your physician or   their representative will relay the results to you with their   recommendations at their soonest availability.  Thank you,  PROVATION

## 2025-07-03 NOTE — ANESTHESIA PREPROCEDURE EVALUATION
07/03/2025  Zunilda Alvarez is a 60 y.o., female.      Pre-op Assessment    I have reviewed the Patient Summary Reports.     I have reviewed the Nursing Notes. I have reviewed the NPO Status.   I have reviewed the Medications.     Review of Systems  Anesthesia Hx:  No problems with previous Anesthesia                Social:  Non-Smoker       Cardiovascular:     Hypertension, well controlled    Dysrhythmias (palpitations)       hyperlipidemia                               Pulmonary:        Sleep Apnea                Renal/:  Renal/ Normal                 Hepatic/GI:     GERD, well controlled Liver Disease,               Musculoskeletal:  Arthritis               Neurological:  Neurology Normal                                      Endocrine:  Diabetes, well controlled, type 2         Obesity / BMI > 30, Morbid Obesity / BMI > 40  Psych:  Psychiatric History  depression              Physical Exam  General: Well nourished, Cooperative, Alert and Oriented    Airway:  Mallampati: II   Mouth Opening: Normal  TM Distance: Normal  Neck ROM: Normal ROM    Anesthesia Plan  Type of Anesthesia, risks & benefits discussed:    Anesthesia Type: Gen ETT, Gen Supraglottic Airway, Gen Natural Airway, MAC  Intra-op Monitoring Plan: Standard ASA Monitors  Post Op Pain Control Plan: multimodal analgesia  Induction:  IV  Airway Plan: Direct, Video and Fiberoptic, Post-Induction  Informed Consent: Informed consent signed with the Patient and all parties understand the risks and agree with anesthesia plan.  All questions answered.   ASA Score: 3    Ready For Surgery From Anesthesia Perspective.   .

## 2025-07-09 ENCOUNTER — CLINICAL SUPPORT (OUTPATIENT)
Dept: REHABILITATION | Facility: HOSPITAL | Age: 60
End: 2025-07-09
Payer: COMMERCIAL

## 2025-07-09 DIAGNOSIS — Z74.09 IMPAIRED FUNCTIONAL MOBILITY, BALANCE, GAIT, AND ENDURANCE: Primary | ICD-10-CM

## 2025-07-09 PROCEDURE — 97530 THERAPEUTIC ACTIVITIES: CPT

## 2025-07-09 PROCEDURE — 97110 THERAPEUTIC EXERCISES: CPT

## 2025-07-09 NOTE — PROGRESS NOTES
Outpatient Rehab    Physical Therapy Visit    Patient Name: Zunilda Alvarez  MRN: [id number]  YOB: 1965  Encounter Date: 7/9/2025    Therapy Diagnosis:   Encounter Diagnosis   Name Primary?    Impaired functional mobility, balance, gait, and endurance Yes     Physician: Rashaad Olivarez*    Physician Orders: Eval and Treat  Medical Diagnosis: History of total knee arthroplasty, left  Surgical Diagnosis: L TKA   Surgical Date: Not applicable for this Episode  Days Since Last Surgery: Not applicable for this Episode    Visit # / Visits Authorized:  10 / 12  Insurance Authorization Period: 5/22/2025 to 12/31/2025  Date of Evaluation: 5/22/2025  Plan of Care Certification: 5/22/2025 to 7/17/2025      PT/PTA:     Number of PTA visits since last PT visit:   Time In: 1100   Time Out: 1140  Total Time (in minutes): 40   Total Billable Time (in minutes):      FOTO:  Intake Score:  %  Survey Score 2:  %  Survey Score 3:  %    Precautions:       Subjective   Doing well; ready for discharge at this time..         Objective            Treatment:  Therapeutic Exercise  TE 1: Knee extension stretch 2 x 2 min 5#  TE 3: SAQ x24 4# 5 second hold  TE 4: LAQ x 24 4#  TE 5: HS peanut squeeze 3 seconds x 20  TE 6: Heel raises x 20  TE 7: Gastroc stretch at wedge 3 x 30 seconds  TE 8: Bike 8 min level 4.0  TE 10: SLR x 20 4#  Therapeutic Activity  TA 1: Sit to stands x 20 low mat  TA 2: Stair laps 5 laps fwd  TA 3: Stair laps 4 laps lateral  TA 5: Stair taps 2 x 20    Time Entry(in minutes):  Therapeutic Activity Time Entry: 15  Therapeutic Exercise Time Entry: 30    Assessment & Plan   Assessment: Patient ready for discharge at this time. Most goals met at this time. Educated patient on continued HEP interventions.        The patient will continue to benefit from skilled outpatient physical therapy in order to address the deficits listed in the problem list on the initial evaluation, provide patient and family  education, and maximize the patients level of independence in the home and community environments.     The patient's spiritual, cultural, and educational needs were considered, and the patient is agreeable to the plan of care and goals.           Plan: Discharge with HEP.    Goals:   Active       LTGs 8 weeks       Patient to improve 30 second sit to stand to 12 reps indicating improved LE strength.  (Progressing)       Start:  05/22/25    Expected End:  07/17/25            Patient to improve LE strength to 4+/5 B to improve gait stability.  (Progressing)       Start:  05/22/25    Expected End:  07/17/25            Patient to improve L SLS to 10 seconds without increased pain to improve gait stability.  (Progressing)       Start:  05/22/25    Expected End:  07/17/25            Patient to walk .25 miles without pain to improve community ambulation.  (Met)       Start:  05/22/25    Expected End:  07/17/25    Resolved:  07/09/25           Resolved       STGs 3 weeks       Patient to demonstrate knowledge and understanding of HEP.  (Met)       Start:  05/22/25    Expected End:  07/17/25    Resolved:  07/09/25         Patient to improve L knee ROM by 10 degrees of flexion.  (Met)       Start:  05/22/25    Expected End:  07/17/25    Resolved:  07/09/25         Patient to improve L Knee extension to 0 degrees.  (Met)       Start:  05/22/25    Expected End:  07/17/25    Resolved:  07/09/25         Patient to perform L SLS of 5 seconds without increased pain.  (Met)       Start:  05/22/25    Expected End:  07/17/25    Resolved:  07/09/25             Vasquez Sanders, PT

## (undated) DEVICE — GLOVE SURG ULTRA TOUCH 8.5

## (undated) DEVICE — DRESSING N ADH OIL EMUL 3X3

## (undated) DEVICE — DRAPE ARTHSCP FLD CTRL POUCH

## (undated) DEVICE — NDL FLTR 5MCRN BLNT TIP 18GX1

## (undated) DEVICE — SEE MEDLINE ITEM 157216

## (undated) DEVICE — CUTTER MENISCUS AGGRESSIVE 4.0

## (undated) DEVICE — DRAPE U STD FILM LG 60X84IN

## (undated) DEVICE — DRAPE STERI INSTRUMENT 1018

## (undated) DEVICE — SYR 30CC LUER LOCK

## (undated) DEVICE — GLOVE SURG ULTRA TOUCH 9

## (undated) DEVICE — TUBING SUCTION SCALLOP 3/16X10

## (undated) DEVICE — BANDAGE MATRIX HK LOOP 6IN 5YD

## (undated) DEVICE — NDL HYPODERMIC BLUNT 18G 1.5IN

## (undated) DEVICE — CANISTER SUCTION JUMBO 12L

## (undated) DEVICE — Device

## (undated) DEVICE — GLOVE SURG ULTRA TOUCH 7

## (undated) DEVICE — GLOVE GAMMEX SURG LF PI SZ 8

## (undated) DEVICE — ELECTRODE REM PLYHSV RETURN 9

## (undated) DEVICE — TUBING CROSSFLOW INFLOW CASS

## (undated) DEVICE — ADHESIVE MASTISOL VIAL 48/BX

## (undated) DEVICE — SYR LUER-LOCK STERILE 3ML

## (undated) DEVICE — COVER PROXIMA MAYO STAND

## (undated) DEVICE — SUT ETHILON 4-0 BLK MONO

## (undated) DEVICE — NDL ANES SPINAL 18X3.5ST 18G

## (undated) DEVICE — GOWN POLY REINF BRTH SLV 3XL

## (undated) DEVICE — SOL LAC RINGERS IRR 3000ML

## (undated) DEVICE — DRAPE THREE-QUARTER 53X77IN

## (undated) DEVICE — SYR B-D REG DETCH 1INX21GA10ML

## (undated) DEVICE — LABEL BLANK SURG 10 PER SHEET

## (undated) DEVICE — NDL ECLIPSE SAFETY 18GX1-1/2IN

## (undated) DEVICE — TOURNIQUET SB QC SP 30X4IN

## (undated) DEVICE — ELECTRODE COOLPULSE 90 W/HAND

## (undated) DEVICE — GOWN POLY REINF X-LONG XL

## (undated) DEVICE — SOCKINETTE IMPERVIOUS 12X48IN

## (undated) DEVICE — GAUZE SPONGE 4X4 12PLY

## (undated) DEVICE — GLOVE SENSICARE PI GRN 7.5

## (undated) DEVICE — GLOVE SENSICARE PI SURG 7

## (undated) DEVICE — UNDERGLOVES BIOGEL PI SIZE 7.5

## (undated) DEVICE — SPONGE COTTON TRAY 4X4IN

## (undated) DEVICE — BNDG COFLEX FOAM LF2 ST 6X5YD

## (undated) DEVICE — GLOVE SENSICARE PI SURG 8.5

## (undated) DEVICE — GOWN POLY REINF BRTH SLV LG

## (undated) DEVICE — SEE MEDLINE ITEM 146292

## (undated) DEVICE — SYR LUER LOCK STERILE 10ML

## (undated) DEVICE — TOURNIQUET 1 PORT BLUE 34X4IN

## (undated) DEVICE — SPONGE LAP 18X18 PREWASHED

## (undated) DEVICE — DRAPE STERI U-SHAPED 47X51IN

## (undated) DEVICE — GLOVE SURG ULTRA TOUCH 8

## (undated) DEVICE — GLOVE SURGEONS ULTRA TOUCH 6.5

## (undated) DEVICE — CLOSURE SKIN STERI STRIP 1/2X4

## (undated) DEVICE — PAD SUREFIT GRND ELECTRD 10FT